# Patient Record
Sex: FEMALE | Race: WHITE | NOT HISPANIC OR LATINO | Employment: OTHER | URBAN - METROPOLITAN AREA
[De-identification: names, ages, dates, MRNs, and addresses within clinical notes are randomized per-mention and may not be internally consistent; named-entity substitution may affect disease eponyms.]

---

## 2017-02-09 ENCOUNTER — HOSPITAL ENCOUNTER (OUTPATIENT)
Dept: GENERAL RADIOLOGY | Facility: HOSPITAL | Age: 82
Discharge: HOME | End: 2017-02-09
Admitting: ORTHOPAEDIC SURGERY

## 2017-02-09 ENCOUNTER — APPOINTMENT (OUTPATIENT)
Dept: PREADMISSION TESTING | Facility: HOSPITAL | Age: 82
End: 2017-02-09

## 2017-02-09 LAB
ABO GROUP BLD: NORMAL
ANION GAP SERPL CALCULATED.3IONS-SCNC: 14.7 MMOL/L
ANTI-K: NORMAL
BACTERIA UR QL AUTO: ABNORMAL /HPF
BILIRUB UR QL STRIP: NEGATIVE
BLD GP AB SCN SERPL QL: POSITIVE
BUN BLD-MCNC: 20 MG/DL (ref 8–23)
BUN/CREAT SERPL: 24.1 (ref 7–25)
CALCIUM SPEC-SCNC: 9.3 MG/DL (ref 8.6–10.5)
CHLORIDE SERPL-SCNC: 100 MMOL/L (ref 98–107)
CLARITY UR: ABNORMAL
CO2 SERPL-SCNC: 24.3 MMOL/L (ref 22–29)
COLOR UR: YELLOW
CREAT BLD-MCNC: 0.83 MG/DL (ref 0.57–1)
DEPRECATED RDW RBC AUTO: 45.2 FL (ref 37–54)
ERYTHROCYTE [DISTWIDTH] IN BLOOD BY AUTOMATED COUNT: 13.9 % (ref 11.7–13)
GFR SERPL CREATININE-BSD FRML MDRD: 66 ML/MIN/1.73
GLUCOSE BLD-MCNC: 119 MG/DL (ref 65–99)
GLUCOSE UR STRIP-MCNC: NEGATIVE MG/DL
HCT VFR BLD AUTO: 35 % (ref 35.6–45.5)
HGB BLD-MCNC: 11.5 G/DL (ref 11.9–15.5)
HGB UR QL STRIP.AUTO: NEGATIVE
HYALINE CASTS UR QL AUTO: ABNORMAL /LPF
KETONES UR QL STRIP: NEGATIVE
LEUKOCYTE ESTERASE UR QL STRIP.AUTO: ABNORMAL
MCH RBC QN AUTO: 29.1 PG (ref 26.9–32)
MCHC RBC AUTO-ENTMCNC: 32.9 G/DL (ref 32.4–36.3)
MCV RBC AUTO: 88.6 FL (ref 80.5–98.2)
NITRITE UR QL STRIP: NEGATIVE
PH UR STRIP.AUTO: 6 [PH] (ref 5–8)
PLATELET # BLD AUTO: 194 10*3/MM3 (ref 140–500)
PMV BLD AUTO: 9.7 FL (ref 6–12)
POTASSIUM BLD-SCNC: 3.6 MMOL/L (ref 3.5–5.2)
PROT UR QL STRIP: ABNORMAL
RBC # BLD AUTO: 3.95 10*6/MM3 (ref 3.9–5.2)
RBC # UR: ABNORMAL /HPF
REF LAB TEST METHOD: ABNORMAL
RH BLD: POSITIVE
SODIUM BLD-SCNC: 139 MMOL/L (ref 136–145)
SP GR UR STRIP: 1.01 (ref 1–1.03)
SQUAMOUS #/AREA URNS HPF: ABNORMAL /HPF
UROBILINOGEN UR QL STRIP: ABNORMAL
WBC NRBC COR # BLD: 5.31 10*3/MM3 (ref 4.5–10.7)
WBC UR QL AUTO: ABNORMAL /HPF

## 2017-02-09 PROCEDURE — 86920 COMPATIBILITY TEST SPIN: CPT

## 2017-02-09 PROCEDURE — 87147 CULTURE TYPE IMMUNOLOGIC: CPT | Performed by: ORTHOPAEDIC SURGERY

## 2017-02-09 PROCEDURE — 80048 BASIC METABOLIC PNL TOTAL CA: CPT | Performed by: ORTHOPAEDIC SURGERY

## 2017-02-09 PROCEDURE — 86900 BLOOD TYPING SEROLOGIC ABO: CPT

## 2017-02-09 PROCEDURE — 86901 BLOOD TYPING SEROLOGIC RH(D): CPT

## 2017-02-09 PROCEDURE — 87086 URINE CULTURE/COLONY COUNT: CPT | Performed by: ORTHOPAEDIC SURGERY

## 2017-02-09 PROCEDURE — 93010 ELECTROCARDIOGRAM REPORT: CPT | Performed by: INTERNAL MEDICINE

## 2017-02-09 PROCEDURE — 86922 COMPATIBILITY TEST ANTIGLOB: CPT

## 2017-02-09 PROCEDURE — 86870 RBC ANTIBODY IDENTIFICATION: CPT

## 2017-02-09 PROCEDURE — 86902 BLOOD TYPE ANTIGEN DONOR EA: CPT

## 2017-02-09 PROCEDURE — 86850 RBC ANTIBODY SCREEN: CPT

## 2017-02-09 PROCEDURE — 36415 COLL VENOUS BLD VENIPUNCTURE: CPT

## 2017-02-09 PROCEDURE — 93005 ELECTROCARDIOGRAM TRACING: CPT

## 2017-02-09 PROCEDURE — 81001 URINALYSIS AUTO W/SCOPE: CPT | Performed by: ORTHOPAEDIC SURGERY

## 2017-02-09 PROCEDURE — 85027 COMPLETE CBC AUTOMATED: CPT | Performed by: ORTHOPAEDIC SURGERY

## 2017-02-09 PROCEDURE — 73030 X-RAY EXAM OF SHOULDER: CPT

## 2017-02-09 RX ORDER — SERTRALINE HYDROCHLORIDE 100 MG/1
100 TABLET, FILM COATED ORAL DAILY
COMMUNITY

## 2017-02-09 RX ORDER — PNV NO.95/FERROUS FUM/FOLIC AC 28MG-0.8MG
1 TABLET ORAL DAILY
COMMUNITY

## 2017-02-09 RX ORDER — AMLODIPINE AND VALSARTAN 5; 160 MG/1; MG/1
1 TABLET ORAL DAILY
COMMUNITY

## 2017-02-09 RX ORDER — SIMVASTATIN 20 MG
20 TABLET ORAL NIGHTLY
COMMUNITY

## 2017-02-09 RX ORDER — PHENOL 1.4 %
600 AEROSOL, SPRAY (ML) MUCOUS MEMBRANE 2 TIMES DAILY
COMMUNITY

## 2017-02-09 RX ORDER — CELECOXIB 200 MG/1
200 CAPSULE ORAL DAILY
COMMUNITY

## 2017-02-09 RX ORDER — LEVOTHYROXINE SODIUM 0.12 MG/1
125 TABLET ORAL DAILY
COMMUNITY

## 2017-02-09 RX ORDER — CHLORHEXIDINE GLUCONATE 500 MG/1
CLOTH TOPICAL
COMMUNITY
End: 2017-04-14 | Stop reason: HOSPADM

## 2017-02-09 RX ORDER — OMEPRAZOLE 20 MG/1
20 CAPSULE, DELAYED RELEASE ORAL DAILY
COMMUNITY

## 2017-02-09 RX ORDER — HYDROCHLOROTHIAZIDE 12.5 MG/1
12.5 TABLET ORAL DAILY
COMMUNITY

## 2017-02-09 NOTE — DISCHARGE INSTRUCTIONS
Take the following medications the morning of surgery with a small sip of water.  EXFORGE  PRILOSEC    ARRIVAL TIME TO BE CALLED TO PATIENT.        General Instructions:  • Do not eat or drink after midnight: includes water, mints, or gum. You may brush your teeth.  • Do not smoke, chew tobacco, or drink alcohol.  • Bring medications in original bottles, any inhalers and if applicable your C-PAP/ BI-PAP machine.  • Bring any papers given to you in the doctor’s office.  • Wear clean comfortable clothes and socks.  • Do not wear contact lenses or make-up.  Bring a case for your glasses if applicable.   • Bring crutches or walker if applicable.  • Leave all other valuables and jewelry at home.    If you were given a blood bank ID arm band remember to bring it with you the day of surgery.    Preventing a Surgical Site Infection:  Shower on the morning of surgery using a fresh bar of anti-bacterial soap (such as Dial) and clean washcloth.  Dry with a clean towel and dress in clean clothing.  For 2 to 3 days before surgery, avoid shaving with a razor near where you will have surgery because the razor can irritate skin and make it easier to develop an infection  Ask your surgeon if you will be receiving antibiotics prior to surgery  Make sure you, your family, and all healthcare providers clean their hands with soap and water or an alcohol based hand  before caring for you or your wound  If at all possible, quit smoking as many days before surgery as you can.    Day of surgery:  Upon arrival, a Pre-op nurse and Anesthesiologist will review your health history, obtain vital signs, and answer questions you may have.  The only belongings needed at this time will be your home medications and if applicable your C-PAP/BI-PAP machine.  If you are staying overnight your family can leave the rest of your belongings in the car and bring them to your room later.  A Pre-op nurse will start an IV and you may receive medication  in preparation for surgery, including something to help you relax.  Your family will be able to see you in the Pre-op area.  While you are in surgery your family should notify the waiting room  if they leave the waiting room area and provide a contact phone number.    Please be aware that surgery does come with discomfort.  We want to make every effort to control your discomfort so please discuss any uncontrolled symptoms with your nurse.   Your doctor will most likely have prescribed pain medications.      If you are going home after surgery you will receive individualized written care instructions before being discharged.  A responsible adult must drive you to and from the hospital on the day of your surgery and stay with you for 24 hours.    If you are staying overnight following surgery, you will be transported to your hospital room following the recovery period.  [facility] has all private rooms.    If you have any questions please call Pre-Admission Testing at [phone].  Deductibles and co-payments are collected on the day of service. Please be prepared to pay the required co-pay, deductible or deposit on the day of service as defined by your plan.    2% CHLORAHEXIDINE GLUCONATE* CLOTH  Preparing or “prepping” skin before surgery can reduce the risk of infection at the surgical site. To make the process easier, [facility] has chosen disposable cloths moistened with a rinse-free, 2% Chlorhexidine Gluconate (CHG) antiseptic solution. The steps below outline the prepping process and should be carefully followed.        Use the prep cloth on the area that is circled in the diagram             Directions Night before Surgery  1) Shower using a fresh bar of anti-bacterial soap (such as Dial) and clean washcloth.  Use a clean towel to completely dry your skin.  2) Do not use any lotions, oils or creams on your skin.  3) Open the package and remove 1 cloth, wipe your skin for  30 seconds in a circular motion.  Allow to dry for 3 minutes.  4) Repeat #3 with second cloth.  5) Do not touch your eyes, ears, or mouth with the prep cloth.  6) Allow the wet prep solution to air dry.  7) Discard the prep cloth and wash your hands with soap and water.   8) Dress in clean bed clothes and sleep on fresh clean bed sheets.   9) You may experience some temporary itching after the prep.    Directions Day of Surgery  1) Repeat steps 1,2,3,4,5,6,7, and 9.   2) Dress in clean clothes before coming to the hospital.    BACTROBAN NASAL OINTMENT  There are many germs normally in your nose. Bactroban is an ointment that will help reduce these germs. Please follow these instructions for Bactroban use:        ____The day before surgery in the morning  Date________    ____The day before surgery in the evening              Date________    ____The day of surgery in the morning    Date________    **Squirt ½ package of Bactroban Ointment onto a cotton applicator and apply to inside of 1st nostril.  Squirt the remaining Bactroban and apply to the inside of the other nostril.

## 2017-02-10 LAB — BACTERIA SPEC AEROBE CULT: ABNORMAL

## 2017-02-24 LAB
ABO + RH BLD: NORMAL
ABO + RH BLD: NORMAL
BH BB BLOOD EXPIRATION DATE: NORMAL
BH BB BLOOD EXPIRATION DATE: NORMAL
BH BB BLOOD TYPE BARCODE: 5100
BH BB BLOOD TYPE BARCODE: 5100
BH BB DISPENSE STATUS: NORMAL
BH BB DISPENSE STATUS: NORMAL
BH BB PRODUCT CODE: NORMAL
BH BB PRODUCT CODE: NORMAL
BH BB UNIT NUMBER: NORMAL
BH BB UNIT NUMBER: NORMAL
CROSSMATCH INTERPRETATION: NORMAL
CROSSMATCH INTERPRETATION: NORMAL
UNIT  ABO: NORMAL
UNIT  ABO: NORMAL
UNIT  RH: NORMAL
UNIT  RH: NORMAL

## 2017-03-29 ENCOUNTER — APPOINTMENT (OUTPATIENT)
Dept: PREADMISSION TESTING | Facility: HOSPITAL | Age: 82
End: 2017-03-29

## 2017-03-29 ENCOUNTER — HOSPITAL ENCOUNTER (OUTPATIENT)
Dept: GENERAL RADIOLOGY | Facility: HOSPITAL | Age: 82
Discharge: HOME | End: 2017-03-29
Admitting: ORTHOPAEDIC SURGERY

## 2017-03-29 VITALS
HEIGHT: 62 IN | SYSTOLIC BLOOD PRESSURE: 141 MMHG | HEART RATE: 77 BPM | OXYGEN SATURATION: 97 % | BODY MASS INDEX: 30.49 KG/M2 | DIASTOLIC BLOOD PRESSURE: 85 MMHG | TEMPERATURE: 98.3 F | RESPIRATION RATE: 20 BRPM | WEIGHT: 165.7 LBS

## 2017-03-29 LAB
ABO GROUP BLD: NORMAL
ANION GAP SERPL CALCULATED.3IONS-SCNC: 13.5 MMOL/L
ANTI-K: NORMAL
BACTERIA UR QL AUTO: ABNORMAL /HPF
BILIRUB UR QL STRIP: NEGATIVE
BLD GP AB SCN SERPL QL: POSITIVE
BUN BLD-MCNC: 19 MG/DL (ref 8–23)
BUN/CREAT SERPL: 21.3 (ref 7–25)
CALCIUM SPEC-SCNC: 10 MG/DL (ref 8.6–10.5)
CHLORIDE SERPL-SCNC: 99 MMOL/L (ref 98–107)
CLARITY UR: ABNORMAL
CO2 SERPL-SCNC: 25.5 MMOL/L (ref 22–29)
COLOR UR: YELLOW
CREAT BLD-MCNC: 0.89 MG/DL (ref 0.57–1)
DEPRECATED RDW RBC AUTO: 44.5 FL (ref 37–54)
ERYTHROCYTE [DISTWIDTH] IN BLOOD BY AUTOMATED COUNT: 13.5 % (ref 11.7–13)
GFR SERPL CREATININE-BSD FRML MDRD: 61 ML/MIN/1.73
GLUCOSE BLD-MCNC: 81 MG/DL (ref 65–99)
GLUCOSE UR STRIP-MCNC: NEGATIVE MG/DL
HCT VFR BLD AUTO: 34.1 % (ref 35.6–45.5)
HGB BLD-MCNC: 11.3 G/DL (ref 11.9–15.5)
HGB UR QL STRIP.AUTO: NEGATIVE
HYALINE CASTS UR QL AUTO: ABNORMAL /LPF
KETONES UR QL STRIP: NEGATIVE
LEUKOCYTE ESTERASE UR QL STRIP.AUTO: ABNORMAL
MCH RBC QN AUTO: 30 PG (ref 26.9–32)
MCHC RBC AUTO-ENTMCNC: 33.1 G/DL (ref 32.4–36.3)
MCV RBC AUTO: 90.5 FL (ref 80.5–98.2)
NITRITE UR QL STRIP: NEGATIVE
PH UR STRIP.AUTO: 6 [PH] (ref 5–8)
PLATELET # BLD AUTO: 206 10*3/MM3 (ref 140–500)
PMV BLD AUTO: 9.4 FL (ref 6–12)
POTASSIUM BLD-SCNC: 3.8 MMOL/L (ref 3.5–5.2)
PROT UR QL STRIP: NEGATIVE
RBC # BLD AUTO: 3.77 10*6/MM3 (ref 3.9–5.2)
RBC # UR: ABNORMAL /HPF
REF LAB TEST METHOD: ABNORMAL
RH BLD: POSITIVE
SODIUM BLD-SCNC: 138 MMOL/L (ref 136–145)
SP GR UR STRIP: 1.02 (ref 1–1.03)
SQUAMOUS #/AREA URNS HPF: ABNORMAL /HPF
T4 FREE SERPL-MCNC: 1.29 NG/DL (ref 0.93–1.7)
TSH SERPL DL<=0.05 MIU/L-ACNC: 4.79 MIU/ML (ref 0.27–4.2)
UROBILINOGEN UR QL STRIP: ABNORMAL
WBC NRBC COR # BLD: 4.88 10*3/MM3 (ref 4.5–10.7)
WBC UR QL AUTO: ABNORMAL /HPF

## 2017-03-29 PROCEDURE — 85027 COMPLETE CBC AUTOMATED: CPT | Performed by: ORTHOPAEDIC SURGERY

## 2017-03-29 PROCEDURE — 86922 COMPATIBILITY TEST ANTIGLOB: CPT

## 2017-03-29 PROCEDURE — 86920 COMPATIBILITY TEST SPIN: CPT

## 2017-03-29 PROCEDURE — 86901 BLOOD TYPING SEROLOGIC RH(D): CPT | Performed by: ORTHOPAEDIC SURGERY

## 2017-03-29 PROCEDURE — 81001 URINALYSIS AUTO W/SCOPE: CPT | Performed by: ORTHOPAEDIC SURGERY

## 2017-03-29 PROCEDURE — 86870 RBC ANTIBODY IDENTIFICATION: CPT

## 2017-03-29 PROCEDURE — 36415 COLL VENOUS BLD VENIPUNCTURE: CPT

## 2017-03-29 PROCEDURE — 73030 X-RAY EXAM OF SHOULDER: CPT

## 2017-03-29 PROCEDURE — 86850 RBC ANTIBODY SCREEN: CPT | Performed by: ORTHOPAEDIC SURGERY

## 2017-03-29 PROCEDURE — 84443 ASSAY THYROID STIM HORMONE: CPT | Performed by: ORTHOPAEDIC SURGERY

## 2017-03-29 PROCEDURE — 87086 URINE CULTURE/COLONY COUNT: CPT | Performed by: ORTHOPAEDIC SURGERY

## 2017-03-29 PROCEDURE — 84439 ASSAY OF FREE THYROXINE: CPT | Performed by: ORTHOPAEDIC SURGERY

## 2017-03-29 PROCEDURE — 86900 BLOOD TYPING SEROLOGIC ABO: CPT | Performed by: ORTHOPAEDIC SURGERY

## 2017-03-29 PROCEDURE — 80048 BASIC METABOLIC PNL TOTAL CA: CPT | Performed by: ORTHOPAEDIC SURGERY

## 2017-03-29 RX ORDER — HYDROCODONE BITARTRATE AND ACETAMINOPHEN 5; 325 MG/1; MG/1
1 TABLET ORAL EVERY 4 HOURS PRN
COMMUNITY
End: 2017-04-14 | Stop reason: HOSPADM

## 2017-03-29 RX ORDER — ZOLPIDEM TARTRATE 10 MG/1
5 TABLET ORAL NIGHTLY
COMMUNITY

## 2017-03-29 NOTE — DISCHARGE INSTRUCTIONS
Take the following medications the morning of surgery with a small sip of water:  EXFORGE, SYNTHROID, OMEPRAZOLE.  STOP PREOPERATIVELY ANY ANTIINFLAMMATORY, CELEBREX, AND MULTIVITAMIN.  ARRIVE TO THE OUTPATIENT SURGERY DESK THE DAY OF YOUR SURGERY BY:  ***TO CALL WITH TIME        General Instructions:  • Do not eat or drink after midnight: includes water, mints, or gum. You may brush your teeth.  • Do not smoke, chew tobacco, or drink alcohol.  • The Pre-Admission Testing nurse will instruct you to bring medications if unable to obtain an accurate list in Pre-Admission Testing.    • If applicable bring your C-PAP/ BI-PAP machine.  • Bring any papers given to you in the doctor’s office.  • Wear clean comfortable clothes and socks.  • Do not wear contact lenses or make-up.  Bring a case for your glasses if applicable.   • Bring crutches or walker if applicable.  • Leave all other valuables and jewelry at home.    If you were given a blood bank ID arm band remember to bring it with you the day of surgery.    Preventing a Surgical Site Infection: Sleep in clean sheets  Shower the night before and on the morning of surgery using a fresh bar of anti-bacterial soap (such as Dial) and clean washcloth.  Dry with a clean towel and dress in clean clothing.  For 2 to 3 days before surgery, avoid shaving with a razor near where you will have surgery because the razor can irritate skin and make it easier to develop an infection  Ask your surgeon if you will be receiving antibiotics prior to surgery  Make sure you, your family, and all healthcare providers clean their hands with soap and water or an alcohol based hand  before caring for you or your wound  If at all possible, quit smoking as many days before surgery as you can.    Day of surgery:  Upon arrival, a Pre-op nurse and Anesthesiologist will review your health history, obtain vital signs, and answer questions you may have.  The only belongings needed at this time  will be your home medications and if applicable your C-PAP/BI-PAP machine.  If you are staying overnight your family can leave the rest of your belongings in the car and bring them to your room later.  A Pre-op nurse will start an IV and you may receive medication in preparation for surgery, including something to help you relax.  Your family will be able to see you in the Pre-op area.  While you are in surgery your family should notify the waiting room  if they leave the waiting room area and provide a contact phone number.    Please be aware that surgery does come with discomfort.  We want to make every effort to control your discomfort so please discuss any uncontrolled symptoms with your nurse.   Your doctor will most likely have prescribed pain medications.      If you are going home after surgery you will receive individualized written care instructions before being discharged.  A responsible adult must drive you to and from the hospital on the day of your surgery and stay with you for 24 hours.    If you are staying overnight following surgery, you will be transported to your hospital room following the recovery period.  [facility] has all private rooms.    If you have any questions please call Pre-Admission Testing at [phone].  Deductibles and co-payments are collected on the day of service. Please be prepared to pay the required co-pay, deductible or deposit on the day of service as defined by your plan.    2% CHLORAHEXIDINE GLUCONATE* CLOTH  Preparing or “prepping” skin before surgery can reduce the risk of infection at the surgical site. To make the process easier, [facility] has chosen disposable cloths moistened with a rinse-free, 2% Chlorhexidine Gluconate (CHG) antiseptic solution. The steps below outline the prepping process and should be carefully followed.        Use the prep cloth on the area that is circled in the diagram             Directions Night  before Surgery  1) Shower using a fresh bar of anti-bacterial soap (such as Dial) and clean washcloth.  Use a clean towel to completely dry your skin.  2) Do not use any lotions, oils or creams on your skin.  3) Open the package and remove 1 cloth, wipe your skin for 30 seconds in a circular motion.  Allow to dry for 3 minutes.  4) Repeat #3 with second cloth.  5) Do not touch your eyes, ears, or mouth with the prep cloth.  6) Allow the wet prep solution to air dry.  7) Discard the prep cloth and wash your hands with soap and water.   8) Dress in clean bed clothes and sleep on fresh clean bed sheets.   9) You may experience some temporary itching after the prep.    Directions Day of Surgery  1) Repeat steps 1,2,3,4,5,6,7, and 9.   2) Dress in clean clothes before coming to the hospital.    BACTROBAN NASAL OINTMENT  There are many germs normally in your nose. Bactroban is an ointment that will help reduce these germs. Please follow these instructions for Bactroban use:    ____Two days before surgery in the evening Date________    ____The day before surgery in the morning  Date________    ____The day before surgery in the evening              Date________    ____The day of surgery in the morning    Date________    **Squirt ½ package of Bactroban Ointment onto a cotton applicator and apply to inside of 1st nostril.  Squirt the remaining Bactroban and apply to the inside of the other nostril.    PERIDEX- ORAL:  Use only if your surgeon has ordered  Use the night before and morning of surgery - Swish, gargle, and spit - do not swallow.

## 2017-03-30 LAB — BACTERIA SPEC AEROBE CULT: NORMAL

## 2017-04-12 ENCOUNTER — APPOINTMENT (OUTPATIENT)
Dept: GENERAL RADIOLOGY | Facility: HOSPITAL | Age: 82
End: 2017-04-12
Attending: ORTHOPAEDIC SURGERY

## 2017-04-12 ENCOUNTER — ANESTHESIA (OUTPATIENT)
Dept: PERIOP | Facility: HOSPITAL | Age: 82
End: 2017-04-12

## 2017-04-12 ENCOUNTER — HOSPITAL ENCOUNTER (INPATIENT)
Facility: HOSPITAL | Age: 82
LOS: 2 days | Discharge: HOME HEALTH | End: 2017-04-14
Attending: ORTHOPAEDIC SURGERY | Admitting: ORTHOPAEDIC SURGERY

## 2017-04-12 ENCOUNTER — ANESTHESIA EVENT (OUTPATIENT)
Dept: PERIOP | Facility: HOSPITAL | Age: 82
End: 2017-04-12

## 2017-04-12 DIAGNOSIS — M62.81 MUSCLE WEAKNESS (GENERALIZED): Primary | ICD-10-CM

## 2017-04-12 DIAGNOSIS — S43.439A SUPERIOR GLENOID LABRUM LESION: ICD-10-CM

## 2017-04-12 DIAGNOSIS — R09.02 HYPOXIA: ICD-10-CM

## 2017-04-12 PROBLEM — Z96.619 S/P REVERSE TOTAL SHOULDER ARTHROPLASTY: Status: ACTIVE | Noted: 2017-04-12

## 2017-04-12 PROCEDURE — 25010000002 ONDANSETRON PER 1 MG: Performed by: NURSE ANESTHETIST, CERTIFIED REGISTERED

## 2017-04-12 PROCEDURE — 25010000002 FENTANYL CITRATE (PF) 100 MCG/2ML SOLUTION: Performed by: ANESTHESIOLOGY

## 2017-04-12 PROCEDURE — C1776 JOINT DEVICE (IMPLANTABLE): HCPCS | Performed by: ORTHOPAEDIC SURGERY

## 2017-04-12 PROCEDURE — 25010000002 MORPHINE PER 10 MG: Performed by: ORTHOPAEDIC SURGERY

## 2017-04-12 PROCEDURE — 25010000002 PROPOFOL 10 MG/ML EMULSION: Performed by: NURSE ANESTHETIST, CERTIFIED REGISTERED

## 2017-04-12 PROCEDURE — 25010000002 MIDAZOLAM PER 1 MG: Performed by: ANESTHESIOLOGY

## 2017-04-12 PROCEDURE — 25010000002 ROPIVACAINE PER 1 MG: Performed by: ANESTHESIOLOGY

## 2017-04-12 PROCEDURE — 88304 TISSUE EXAM BY PATHOLOGIST: CPT | Performed by: ORTHOPAEDIC SURGERY

## 2017-04-12 PROCEDURE — 25010000002 NEOSTIGMINE 10 MG/10ML SOLUTION: Performed by: NURSE ANESTHETIST, CERTIFIED REGISTERED

## 2017-04-12 PROCEDURE — 25010000002 VANCOMYCIN PER 500 MG

## 2017-04-12 PROCEDURE — 0RRK00Z REPLACEMENT OF LEFT SHOULDER JOINT WITH REVERSE BALL AND SOCKET SYNTHETIC SUBSTITUTE, OPEN APPROACH: ICD-10-PCS | Performed by: ORTHOPAEDIC SURGERY

## 2017-04-12 PROCEDURE — 94799 UNLISTED PULMONARY SVC/PX: CPT

## 2017-04-12 DEVICE — PLT/JOINT GLEN EQUINOXE STD/POST: Type: IMPLANTABLE DEVICE | Site: SHOULDER | Status: FUNCTIONAL

## 2017-04-12 DEVICE — SCRW COMPR EQUINOXE LK 4.5X22MM: Type: IMPLANTABLE DEVICE | Site: SHOULDER | Status: FUNCTIONAL

## 2017-04-12 DEVICE — GLENOSPHERE SHLDR/REV EQUINOXE 38MM: Type: IMPLANTABLE DEVICE | Site: SHOULDER | Status: FUNCTIONAL

## 2017-04-12 DEVICE — SCRW COMPR EQUINOXE LK 4.5X30MM: Type: IMPLANTABLE DEVICE | Site: SHOULDER | Status: FUNCTIONAL

## 2017-04-12 DEVICE — SCRW LK EQUINOXE GLENOSPHERE REV/SHLDR: Type: IMPLANTABLE DEVICE | Site: SHOULDER | Status: FUNCTIONAL

## 2017-04-12 DEVICE — IMPLANTABLE DEVICE: Type: IMPLANTABLE DEVICE | Site: SHOULDER | Status: FUNCTIONAL

## 2017-04-12 DEVICE — LINER HUM EQUINOXE REV SHLDR 38 PLS0: Type: IMPLANTABLE DEVICE | Site: SHOULDER | Status: FUNCTIONAL

## 2017-04-12 DEVICE — SCRW COMPR EQUINOXE LK 4.5X26MM: Type: IMPLANTABLE DEVICE | Site: SHOULDER | Status: FUNCTIONAL

## 2017-04-12 DEVICE — STEM HUM PRI EQUINX PF 11MM: Type: IMPLANTABLE DEVICE | Site: SHOULDER | Status: FUNCTIONAL

## 2017-04-12 DEVICE — SCRW COMPR EQUINOXE LK 4.5X18MM: Type: IMPLANTABLE DEVICE | Site: SHOULDER | Status: FUNCTIONAL

## 2017-04-12 DEVICE — TRY HUM EQUINOXE ADPT REV SHLDR PLS0: Type: IMPLANTABLE DEVICE | Site: SHOULDER | Status: FUNCTIONAL

## 2017-04-12 DEVICE — SCRW EQUINOXE TORQ DEFINE REV SHLDR KT: Type: IMPLANTABLE DEVICE | Site: SHOULDER | Status: FUNCTIONAL

## 2017-04-12 RX ORDER — MAGNESIUM HYDROXIDE 1200 MG/15ML
LIQUID ORAL AS NEEDED
Status: DISCONTINUED | OUTPATIENT
Start: 2017-04-12 | End: 2017-04-12 | Stop reason: HOSPADM

## 2017-04-12 RX ORDER — LIDOCAINE HYDROCHLORIDE 20 MG/ML
INJECTION, SOLUTION INFILTRATION; PERINEURAL AS NEEDED
Status: DISCONTINUED | OUTPATIENT
Start: 2017-04-12 | End: 2017-04-12 | Stop reason: SURG

## 2017-04-12 RX ORDER — LABETALOL HYDROCHLORIDE 5 MG/ML
5 INJECTION, SOLUTION INTRAVENOUS
Status: DISCONTINUED | OUTPATIENT
Start: 2017-04-12 | End: 2017-04-12

## 2017-04-12 RX ORDER — ROPIVACAINE HYDROCHLORIDE 5 MG/ML
INJECTION, SOLUTION EPIDURAL; INFILTRATION; PERINEURAL AS NEEDED
Status: DISCONTINUED | OUTPATIENT
Start: 2017-04-12 | End: 2017-04-12 | Stop reason: SURG

## 2017-04-12 RX ORDER — LEVOTHYROXINE SODIUM 0.12 MG/1
125 TABLET ORAL
Status: DISCONTINUED | OUTPATIENT
Start: 2017-04-13 | End: 2017-04-14 | Stop reason: HOSPADM

## 2017-04-12 RX ORDER — PROMETHAZINE HYDROCHLORIDE 25 MG/ML
12.5 INJECTION, SOLUTION INTRAMUSCULAR; INTRAVENOUS ONCE AS NEEDED
Status: DISCONTINUED | OUTPATIENT
Start: 2017-04-12 | End: 2017-04-12

## 2017-04-12 RX ORDER — ONDANSETRON 2 MG/ML
4 INJECTION INTRAMUSCULAR; INTRAVENOUS EVERY 6 HOURS PRN
Status: DISCONTINUED | OUTPATIENT
Start: 2017-04-12 | End: 2017-04-14 | Stop reason: HOSPADM

## 2017-04-12 RX ORDER — HYDROCHLOROTHIAZIDE 25 MG/1
12.5 TABLET ORAL DAILY
Status: DISCONTINUED | OUTPATIENT
Start: 2017-04-12 | End: 2017-04-14 | Stop reason: HOSPADM

## 2017-04-12 RX ORDER — ONDANSETRON 4 MG/1
4 TABLET, ORALLY DISINTEGRATING ORAL EVERY 6 HOURS PRN
Status: DISCONTINUED | OUTPATIENT
Start: 2017-04-12 | End: 2017-04-14 | Stop reason: HOSPADM

## 2017-04-12 RX ORDER — NALOXONE HCL 0.4 MG/ML
0.2 VIAL (ML) INJECTION AS NEEDED
Status: DISCONTINUED | OUTPATIENT
Start: 2017-04-12 | End: 2017-04-12

## 2017-04-12 RX ORDER — FENTANYL CITRATE 50 UG/ML
50 INJECTION, SOLUTION INTRAMUSCULAR; INTRAVENOUS
Status: DISCONTINUED | OUTPATIENT
Start: 2017-04-12 | End: 2017-04-12

## 2017-04-12 RX ORDER — HYDROMORPHONE HYDROCHLORIDE 1 MG/ML
0.5 INJECTION, SOLUTION INTRAMUSCULAR; INTRAVENOUS; SUBCUTANEOUS
Status: DISCONTINUED | OUTPATIENT
Start: 2017-04-12 | End: 2017-04-12

## 2017-04-12 RX ORDER — DIAZEPAM 5 MG/1
5 TABLET ORAL EVERY 6 HOURS PRN
Status: DISCONTINUED | OUTPATIENT
Start: 2017-04-12 | End: 2017-04-14 | Stop reason: HOSPADM

## 2017-04-12 RX ORDER — HYDROCODONE BITARTRATE AND ACETAMINOPHEN 7.5; 325 MG/1; MG/1
1 TABLET ORAL ONCE AS NEEDED
Status: DISCONTINUED | OUTPATIENT
Start: 2017-04-12 | End: 2017-04-12

## 2017-04-12 RX ORDER — PROMETHAZINE HYDROCHLORIDE 25 MG/1
25 TABLET ORAL ONCE AS NEEDED
Status: DISCONTINUED | OUTPATIENT
Start: 2017-04-12 | End: 2017-04-12

## 2017-04-12 RX ORDER — ROCURONIUM BROMIDE 10 MG/ML
INJECTION, SOLUTION INTRAVENOUS AS NEEDED
Status: DISCONTINUED | OUTPATIENT
Start: 2017-04-12 | End: 2017-04-12 | Stop reason: SURG

## 2017-04-12 RX ORDER — ONDANSETRON 4 MG/1
4 TABLET, FILM COATED ORAL EVERY 6 HOURS PRN
Status: DISCONTINUED | OUTPATIENT
Start: 2017-04-12 | End: 2017-04-14 | Stop reason: HOSPADM

## 2017-04-12 RX ORDER — NALOXONE HCL 0.4 MG/ML
0.4 VIAL (ML) INJECTION
Status: DISCONTINUED | OUTPATIENT
Start: 2017-04-12 | End: 2017-04-14 | Stop reason: HOSPADM

## 2017-04-12 RX ORDER — VANCOMYCIN HYDROCHLORIDE 1 G/200ML
1 INJECTION, SOLUTION INTRAVENOUS ONCE
Status: COMPLETED | OUTPATIENT
Start: 2017-04-12 | End: 2017-04-12

## 2017-04-12 RX ORDER — SODIUM CHLORIDE 0.9 % (FLUSH) 0.9 %
1-10 SYRINGE (ML) INJECTION AS NEEDED
Status: DISCONTINUED | OUTPATIENT
Start: 2017-04-12 | End: 2017-04-12 | Stop reason: HOSPADM

## 2017-04-12 RX ORDER — OXYCODONE AND ACETAMINOPHEN 7.5; 325 MG/1; MG/1
1 TABLET ORAL ONCE AS NEEDED
Status: DISCONTINUED | OUTPATIENT
Start: 2017-04-12 | End: 2017-04-12

## 2017-04-12 RX ORDER — SODIUM CHLORIDE 450 MG/100ML
75 INJECTION, SOLUTION INTRAVENOUS CONTINUOUS
Status: DISCONTINUED | OUTPATIENT
Start: 2017-04-12 | End: 2017-04-14 | Stop reason: HOSPADM

## 2017-04-12 RX ORDER — MIDAZOLAM HYDROCHLORIDE 1 MG/ML
2 INJECTION INTRAMUSCULAR; INTRAVENOUS
Status: DISCONTINUED | OUTPATIENT
Start: 2017-04-12 | End: 2017-04-12 | Stop reason: HOSPADM

## 2017-04-12 RX ORDER — CELECOXIB 200 MG/1
200 CAPSULE ORAL DAILY
Status: DISCONTINUED | OUTPATIENT
Start: 2017-04-12 | End: 2017-04-14 | Stop reason: HOSPADM

## 2017-04-12 RX ORDER — OXYCODONE HYDROCHLORIDE AND ACETAMINOPHEN 5; 325 MG/1; MG/1
1 TABLET ORAL EVERY 4 HOURS PRN
Status: DISCONTINUED | OUTPATIENT
Start: 2017-04-12 | End: 2017-04-14 | Stop reason: HOSPADM

## 2017-04-12 RX ORDER — ONDANSETRON 2 MG/ML
4 INJECTION INTRAMUSCULAR; INTRAVENOUS ONCE AS NEEDED
Status: DISCONTINUED | OUTPATIENT
Start: 2017-04-12 | End: 2017-04-12

## 2017-04-12 RX ORDER — MIDAZOLAM HYDROCHLORIDE 1 MG/ML
1 INJECTION INTRAMUSCULAR; INTRAVENOUS
Status: DISCONTINUED | OUTPATIENT
Start: 2017-04-12 | End: 2017-04-12 | Stop reason: HOSPADM

## 2017-04-12 RX ORDER — NEOSTIGMINE METHYLSULFATE 1 MG/ML
INJECTION, SOLUTION INTRAVENOUS AS NEEDED
Status: DISCONTINUED | OUTPATIENT
Start: 2017-04-12 | End: 2017-04-12 | Stop reason: SURG

## 2017-04-12 RX ORDER — FENTANYL CITRATE 50 UG/ML
50 INJECTION, SOLUTION INTRAMUSCULAR; INTRAVENOUS
Status: DISCONTINUED | OUTPATIENT
Start: 2017-04-12 | End: 2017-04-12 | Stop reason: HOSPADM

## 2017-04-12 RX ORDER — ONDANSETRON 2 MG/ML
INJECTION INTRAMUSCULAR; INTRAVENOUS AS NEEDED
Status: DISCONTINUED | OUTPATIENT
Start: 2017-04-12 | End: 2017-04-12 | Stop reason: SURG

## 2017-04-12 RX ORDER — VANCOMYCIN HYDROCHLORIDE 1 G/200ML
INJECTION, SOLUTION INTRAVENOUS
Status: COMPLETED
Start: 2017-04-12 | End: 2017-04-12

## 2017-04-12 RX ORDER — ZOLPIDEM TARTRATE 5 MG/1
5 TABLET ORAL NIGHTLY
Status: DISCONTINUED | OUTPATIENT
Start: 2017-04-12 | End: 2017-04-14 | Stop reason: HOSPADM

## 2017-04-12 RX ORDER — SERTRALINE HYDROCHLORIDE 100 MG/1
100 TABLET, FILM COATED ORAL DAILY
Status: DISCONTINUED | OUTPATIENT
Start: 2017-04-12 | End: 2017-04-14 | Stop reason: HOSPADM

## 2017-04-12 RX ORDER — PROMETHAZINE HYDROCHLORIDE 25 MG/1
25 SUPPOSITORY RECTAL ONCE AS NEEDED
Status: DISCONTINUED | OUTPATIENT
Start: 2017-04-12 | End: 2017-04-12

## 2017-04-12 RX ORDER — GLYCOPYRROLATE 0.2 MG/ML
INJECTION INTRAMUSCULAR; INTRAVENOUS AS NEEDED
Status: DISCONTINUED | OUTPATIENT
Start: 2017-04-12 | End: 2017-04-12 | Stop reason: SURG

## 2017-04-12 RX ORDER — SODIUM CHLORIDE, SODIUM LACTATE, POTASSIUM CHLORIDE, CALCIUM CHLORIDE 600; 310; 30; 20 MG/100ML; MG/100ML; MG/100ML; MG/100ML
9 INJECTION, SOLUTION INTRAVENOUS CONTINUOUS
Status: DISCONTINUED | OUTPATIENT
Start: 2017-04-12 | End: 2017-04-14 | Stop reason: HOSPADM

## 2017-04-12 RX ORDER — HYDRALAZINE HYDROCHLORIDE 20 MG/ML
5 INJECTION INTRAMUSCULAR; INTRAVENOUS
Status: DISCONTINUED | OUTPATIENT
Start: 2017-04-12 | End: 2017-04-12

## 2017-04-12 RX ORDER — PROPOFOL 10 MG/ML
VIAL (ML) INTRAVENOUS AS NEEDED
Status: DISCONTINUED | OUTPATIENT
Start: 2017-04-12 | End: 2017-04-12 | Stop reason: SURG

## 2017-04-12 RX ORDER — FAMOTIDINE 10 MG/ML
20 INJECTION, SOLUTION INTRAVENOUS ONCE
Status: COMPLETED | OUTPATIENT
Start: 2017-04-12 | End: 2017-04-12

## 2017-04-12 RX ORDER — FLUMAZENIL 0.1 MG/ML
0.2 INJECTION INTRAVENOUS AS NEEDED
Status: DISCONTINUED | OUTPATIENT
Start: 2017-04-12 | End: 2017-04-12

## 2017-04-12 RX ORDER — PROMETHAZINE HYDROCHLORIDE 25 MG/1
12.5 TABLET ORAL ONCE AS NEEDED
Status: DISCONTINUED | OUTPATIENT
Start: 2017-04-12 | End: 2017-04-12

## 2017-04-12 RX ORDER — BISACODYL 10 MG
10 SUPPOSITORY, RECTAL RECTAL DAILY PRN
Status: DISCONTINUED | OUTPATIENT
Start: 2017-04-12 | End: 2017-04-14 | Stop reason: HOSPADM

## 2017-04-12 RX ORDER — SENNA AND DOCUSATE SODIUM 50; 8.6 MG/1; MG/1
2 TABLET, FILM COATED ORAL 2 TIMES DAILY
Status: DISCONTINUED | OUTPATIENT
Start: 2017-04-12 | End: 2017-04-14 | Stop reason: HOSPADM

## 2017-04-12 RX ORDER — DIPHENHYDRAMINE HYDROCHLORIDE 50 MG/ML
12.5 INJECTION INTRAMUSCULAR; INTRAVENOUS
Status: DISCONTINUED | OUTPATIENT
Start: 2017-04-12 | End: 2017-04-12

## 2017-04-12 RX ORDER — DIPHENHYDRAMINE HCL 25 MG
25 CAPSULE ORAL EVERY 6 HOURS PRN
Status: DISCONTINUED | OUTPATIENT
Start: 2017-04-12 | End: 2017-04-14 | Stop reason: HOSPADM

## 2017-04-12 RX ADMIN — GLYCOPYRROLATE 0.4 MG: 0.2 INJECTION INTRAMUSCULAR; INTRAVENOUS at 14:15

## 2017-04-12 RX ADMIN — VANCOMYCIN HYDROCHLORIDE 1 G: 1 INJECTION, SOLUTION INTRAVENOUS at 12:46

## 2017-04-12 RX ADMIN — ROPIVACAINE HYDROCHLORIDE 30 ML: 5 INJECTION, SOLUTION EPIDURAL; INFILTRATION; PERINEURAL at 12:20

## 2017-04-12 RX ADMIN — EPHEDRINE SULFATE 10 MG: 50 INJECTION INTRAMUSCULAR; INTRAVENOUS; SUBCUTANEOUS at 13:55

## 2017-04-12 RX ADMIN — FAMOTIDINE 20 MG: 10 INJECTION, SOLUTION INTRAVENOUS at 11:33

## 2017-04-12 RX ADMIN — ZOLPIDEM TARTRATE 5 MG: 5 TABLET, FILM COATED ORAL at 20:27

## 2017-04-12 RX ADMIN — EPHEDRINE SULFATE 10 MG: 50 INJECTION INTRAMUSCULAR; INTRAVENOUS; SUBCUTANEOUS at 13:45

## 2017-04-12 RX ADMIN — SODIUM CHLORIDE 75 ML/HR: 4.5 INJECTION, SOLUTION INTRAVENOUS at 19:06

## 2017-04-12 RX ADMIN — PROPOFOL 25 MG: 10 INJECTION, EMULSION INTRAVENOUS at 13:05

## 2017-04-12 RX ADMIN — NEOSTIGMINE METHYLSULFATE 2.5 MG: 1 INJECTION INTRAVENOUS at 14:15

## 2017-04-12 RX ADMIN — PROPOFOL 25 MG: 10 INJECTION, EMULSION INTRAVENOUS at 13:08

## 2017-04-12 RX ADMIN — SODIUM CHLORIDE, POTASSIUM CHLORIDE, SODIUM LACTATE AND CALCIUM CHLORIDE 9 ML/HR: 600; 310; 30; 20 INJECTION, SOLUTION INTRAVENOUS at 11:32

## 2017-04-12 RX ADMIN — SODIUM CHLORIDE, POTASSIUM CHLORIDE, SODIUM LACTATE AND CALCIUM CHLORIDE 9 ML/HR: 600; 310; 30; 20 INJECTION, SOLUTION INTRAVENOUS at 15:42

## 2017-04-12 RX ADMIN — OXYCODONE HYDROCHLORIDE AND ACETAMINOPHEN 1 TABLET: 5; 325 TABLET ORAL at 17:38

## 2017-04-12 RX ADMIN — EPHEDRINE SULFATE 10 MG: 50 INJECTION INTRAMUSCULAR; INTRAVENOUS; SUBCUTANEOUS at 13:50

## 2017-04-12 RX ADMIN — ONDANSETRON 4 MG: 2 INJECTION INTRAMUSCULAR; INTRAVENOUS at 14:15

## 2017-04-12 RX ADMIN — FENTANYL CITRATE 50 MCG: 50 INJECTION INTRAMUSCULAR; INTRAVENOUS at 12:23

## 2017-04-12 RX ADMIN — EPHEDRINE SULFATE 5 MG: 50 INJECTION INTRAMUSCULAR; INTRAVENOUS; SUBCUTANEOUS at 13:20

## 2017-04-12 RX ADMIN — MUPIROCIN CALCIUM: 20 OINTMENT TOPICAL at 18:51

## 2017-04-12 RX ADMIN — EPHEDRINE SULFATE 10 MG: 50 INJECTION INTRAMUSCULAR; INTRAVENOUS; SUBCUTANEOUS at 13:35

## 2017-04-12 RX ADMIN — CELECOXIB 200 MG: 200 CAPSULE ORAL at 20:27

## 2017-04-12 RX ADMIN — SERTRALINE 100 MG: 100 TABLET, FILM COATED ORAL at 20:27

## 2017-04-12 RX ADMIN — MIDAZOLAM 1 MG: 1 INJECTION INTRAMUSCULAR; INTRAVENOUS at 11:33

## 2017-04-12 RX ADMIN — DOCUSATE SODIUM,SENNOSIDES 2 TABLET: 50; 8.6 TABLET, FILM COATED ORAL at 18:51

## 2017-04-12 RX ADMIN — MORPHINE SULFATE 4 MG: 4 INJECTION, SOLUTION INTRAMUSCULAR; INTRAVENOUS at 18:52

## 2017-04-12 RX ADMIN — OXYCODONE HYDROCHLORIDE AND ACETAMINOPHEN 1 TABLET: 5; 325 TABLET ORAL at 21:54

## 2017-04-12 RX ADMIN — PROPOFOL 150 MG: 10 INJECTION, EMULSION INTRAVENOUS at 12:54

## 2017-04-12 RX ADMIN — FENTANYL CITRATE 50 MCG: 50 INJECTION INTRAMUSCULAR; INTRAVENOUS at 11:33

## 2017-04-12 RX ADMIN — ROCURONIUM BROMIDE 30 MG: 10 INJECTION INTRAVENOUS at 12:54

## 2017-04-12 RX ADMIN — MIDAZOLAM 1 MG: 1 INJECTION INTRAMUSCULAR; INTRAVENOUS at 12:23

## 2017-04-12 RX ADMIN — EPHEDRINE SULFATE 5 MG: 50 INJECTION INTRAMUSCULAR; INTRAVENOUS; SUBCUTANEOUS at 14:10

## 2017-04-12 RX ADMIN — LIDOCAINE HYDROCHLORIDE 50 MG: 20 INJECTION, SOLUTION INFILTRATION; PERINEURAL at 12:54

## 2017-04-12 NOTE — ANESTHESIA POSTPROCEDURE EVALUATION
Patient: Candice Walker    Procedure Summary     Date Anesthesia Start Anesthesia Stop Room / Location    04/12/17 1246 [id number]  [facility] /  [facility]       Procedure Diagnosis Surgeon Provider    LT TOTAL SHOULDER REVERSE ARTHROPLASTY (Left Shoulder) No diagnosis on file. MD Juan Luis Sinha MD          Anesthesia Type: general, regional  Last vitals  /63 (04/12/17 1445)    Temp 36.2 °C (97.2 °F) (04/12/17 1430)    Pulse 83 (04/12/17 1445)   Resp 19 (04/12/17 1445)    SpO2 96 % (04/12/17 1445)      Post Anesthesia Care and Evaluation    Patient location during evaluation: PACU  Patient participation: complete - patient participated  Level of consciousness: awake and alert  Pain management: adequate  Airway patency: patent  Anesthetic complications: No anesthetic complications    Cardiovascular status: acceptable  Respiratory status: acceptable  Hydration status: acceptable

## 2017-04-12 NOTE — ANESTHESIA PROCEDURE NOTES
Peripheral Block    Patient location during procedure: holding area  Start time: 4/12/2017 12:26 PM  Stop time: 4/12/2017 12:31 PM  Reason for block: at surgeon's request and post-op pain management  Performed by  Anesthesiologist: BEN STORM  Preanesthetic Checklist  Completed: patient identified, site marked, surgical consent, pre-op evaluation, timeout performed, IV checked, risks and benefits discussed and monitors and equipment checked  Peripheral Block Prep:  Sterile barriers:cap, gloves and mask  Prep: ChloraPrep  Patient monitoring: blood pressure monitoring, continuous pulse oximetry and EKG  Peripheral Procedure  Sedation:yes  Guidance:ultrasound guided  Images:still images obtained    Laterality:left  Block Type:interscalene  Injection Technique:single-shot  Needle Type:echogenic  Needle Gauge:22 G  ULTRASOUND INTERPRETATION.  Using ultrasound guidance a 22 G (22G needle for placement of 3cc 2%lido to site prior to start of procedure.) gauge needle was placed in close proximity to the brachial plexus nerve, at which point, under ultrasound guidance anesthetic was injected in the area of the nerve and spread of the anesthesia was seen on ultrasound in close proximity thereto.  There were no abnormalities seen on ultrasound; a digital image was taken; and the patient tolerated the procedure with no complications.   Medications  Local Injected:ropivacaine 0.5% without epinephrine Local Amount Injected:30mL  Post Assessment  Injection Assessment: negative aspiration for heme, no paresthesia on injection and incremental injection  Patient Tolerance:comfortable throughout block  Complications:no

## 2017-04-12 NOTE — ANESTHESIA PROCEDURE NOTES
Airway  Airway not difficult    General Information and Staff    Patient location during procedure: OR  Anesthesiologist: YARITZA GUALLPA  CRNA: GEOVANNA NUNO    Indications and Patient Condition  Indications for airway management: airway protection    Preoxygenated: yes  MILS maintained throughout  Mask difficulty assessment: 1 - vent by mask    Final Airway Details  Final airway type: endotracheal airway      Successful airway: ETT  Cuffed: yes   Successful intubation technique: direct laryngoscopy  Facilitating devices/methods: intubating stylet  Endotracheal tube insertion site: oral  Blade: Hue  Blade size: #3  ETT size: 7.0 mm  Cormack-Lehane Classification: grade IIa - partial view of glottis  Placement verified by: chest auscultation and capnometry   Cuff volume (mL): 4  Measured from: lips  ETT to lips (cm): 20  Number of attempts at approach: 1    Additional Comments  Atraumatic ET Tube placement.  Teeth as pre-op. BLEBS.  -ABD sounds.  +ET CO2.  Secured to face

## 2017-04-12 NOTE — ANESTHESIA PREPROCEDURE EVALUATION
Anesthesia Evaluation     Patient summary reviewed and Nursing notes reviewed   no history of anesthetic complications:  NPO Status: > 8 hours   Airway   Mallampati: II  TM distance: >3 FB  Neck ROM: full  no difficulty expected  Dental - normal exam     Pulmonary - negative pulmonary ROS and normal exam   Cardiovascular - normal exam  Exercise tolerance: good (4-7 METS)    ECG reviewed  Rhythm: regular  Rate: normal    (+) hypertension well controlled, valvular problems/murmurs,     ROS comment: Known RBBB    Neuro/Psych  (+) psychiatric history Anxiety and Depression,    GI/Hepatic/Renal/Endo    (+)  GERD well controlled,     Musculoskeletal     Abdominal  - normal exam   Substance History - negative use     OB/GYN negative ob/gyn ROS         Other   (+) arthritis                                 Anesthesia Plan    ASA 3     general and regional   (GA w/ISB)  intravenous induction   Anesthetic plan and risks discussed with patient.    Plan discussed with CRNA and attending.

## 2017-04-13 ENCOUNTER — APPOINTMENT (OUTPATIENT)
Dept: GENERAL RADIOLOGY | Facility: HOSPITAL | Age: 82
End: 2017-04-13

## 2017-04-13 LAB
ABO + RH BLD: NORMAL
ABO + RH BLD: NORMAL
ANION GAP SERPL CALCULATED.3IONS-SCNC: 13 MMOL/L
BH BB BLOOD EXPIRATION DATE: NORMAL
BH BB BLOOD EXPIRATION DATE: NORMAL
BH BB BLOOD TYPE BARCODE: 5100
BH BB BLOOD TYPE BARCODE: 5100
BH BB DISPENSE STATUS: NORMAL
BH BB DISPENSE STATUS: NORMAL
BH BB PRODUCT CODE: NORMAL
BH BB PRODUCT CODE: NORMAL
BH BB UNIT NUMBER: NORMAL
BH BB UNIT NUMBER: NORMAL
BUN BLD-MCNC: 15 MG/DL (ref 8–23)
BUN/CREAT SERPL: 15.5 (ref 7–25)
CALCIUM SPEC-SCNC: 8.2 MG/DL (ref 8.6–10.5)
CHLORIDE SERPL-SCNC: 98 MMOL/L (ref 98–107)
CO2 SERPL-SCNC: 25 MMOL/L (ref 22–29)
CREAT BLD-MCNC: 0.97 MG/DL (ref 0.57–1)
CROSSMATCH INTERPRETATION: NORMAL
CROSSMATCH INTERPRETATION: NORMAL
GFR SERPL CREATININE-BSD FRML MDRD: 55 ML/MIN/1.73
GLUCOSE BLD-MCNC: 104 MG/DL (ref 65–99)
HCT VFR BLD AUTO: 26.5 % (ref 35.6–45.5)
HGB BLD-MCNC: 8.6 G/DL (ref 11.9–15.5)
POTASSIUM BLD-SCNC: 3.3 MMOL/L (ref 3.5–5.2)
SODIUM BLD-SCNC: 136 MMOL/L (ref 136–145)
UNIT  ABO: NORMAL
UNIT  ABO: NORMAL
UNIT  RH: NORMAL
UNIT  RH: NORMAL

## 2017-04-13 PROCEDURE — 25010000002 ENOXAPARIN PER 10 MG: Performed by: ORTHOPAEDIC SURGERY

## 2017-04-13 PROCEDURE — 25010000002 METHYLPREDNISOLONE PER 40 MG: Performed by: INTERNAL MEDICINE

## 2017-04-13 PROCEDURE — 25010000002 VANCOMYCIN PER 500 MG: Performed by: ORTHOPAEDIC SURGERY

## 2017-04-13 PROCEDURE — 85014 HEMATOCRIT: CPT | Performed by: ORTHOPAEDIC SURGERY

## 2017-04-13 PROCEDURE — 94799 UNLISTED PULMONARY SVC/PX: CPT

## 2017-04-13 PROCEDURE — 80048 BASIC METABOLIC PNL TOTAL CA: CPT | Performed by: ORTHOPAEDIC SURGERY

## 2017-04-13 PROCEDURE — 85018 HEMOGLOBIN: CPT | Performed by: ORTHOPAEDIC SURGERY

## 2017-04-13 PROCEDURE — 94640 AIRWAY INHALATION TREATMENT: CPT

## 2017-04-13 RX ORDER — OXYCODONE HYDROCHLORIDE AND ACETAMINOPHEN 5; 325 MG/1; MG/1
2 TABLET ORAL EVERY 4 HOURS PRN
Qty: 40 TABLET | Refills: 0 | Status: SHIPPED | OUTPATIENT
Start: 2017-04-13 | End: 2017-04-14

## 2017-04-13 RX ORDER — METHYLPREDNISOLONE SODIUM SUCCINATE 40 MG/ML
20 INJECTION, POWDER, LYOPHILIZED, FOR SOLUTION INTRAMUSCULAR; INTRAVENOUS ONCE
Status: COMPLETED | OUTPATIENT
Start: 2017-04-13 | End: 2017-04-13

## 2017-04-13 RX ORDER — IPRATROPIUM BROMIDE AND ALBUTEROL SULFATE 2.5; .5 MG/3ML; MG/3ML
3 SOLUTION RESPIRATORY (INHALATION)
Status: DISCONTINUED | OUTPATIENT
Start: 2017-04-13 | End: 2017-04-14 | Stop reason: HOSPADM

## 2017-04-13 RX ORDER — VANCOMYCIN HYDROCHLORIDE 1 G/200ML
1 INJECTION, SOLUTION INTRAVENOUS ONCE
Status: COMPLETED | OUTPATIENT
Start: 2017-04-13 | End: 2017-04-13

## 2017-04-13 RX ADMIN — OXYCODONE HYDROCHLORIDE AND ACETAMINOPHEN 1 TABLET: 5; 325 TABLET ORAL at 17:47

## 2017-04-13 RX ADMIN — OXYCODONE HYDROCHLORIDE AND ACETAMINOPHEN 1 TABLET: 5; 325 TABLET ORAL at 22:23

## 2017-04-13 RX ADMIN — OXYCODONE HYDROCHLORIDE AND ACETAMINOPHEN 1 TABLET: 5; 325 TABLET ORAL at 11:51

## 2017-04-13 RX ADMIN — METHYLPREDNISOLONE SODIUM SUCCINATE 20 MG: 40 INJECTION, POWDER, FOR SOLUTION INTRAMUSCULAR; INTRAVENOUS at 23:47

## 2017-04-13 RX ADMIN — VANCOMYCIN HYDROCHLORIDE 1 G: 1 INJECTION, SOLUTION INTRAVENOUS at 10:16

## 2017-04-13 RX ADMIN — ZOLPIDEM TARTRATE 5 MG: 5 TABLET, FILM COATED ORAL at 23:15

## 2017-04-13 RX ADMIN — MUPIROCIN CALCIUM: 20 OINTMENT TOPICAL at 09:21

## 2017-04-13 RX ADMIN — CELECOXIB 200 MG: 200 CAPSULE ORAL at 09:21

## 2017-04-13 RX ADMIN — ENOXAPARIN SODIUM 40 MG: 40 INJECTION SUBCUTANEOUS at 09:21

## 2017-04-13 RX ADMIN — LEVOTHYROXINE SODIUM 125 MCG: 125 TABLET ORAL at 06:21

## 2017-04-13 RX ADMIN — SERTRALINE 100 MG: 100 TABLET, FILM COATED ORAL at 09:22

## 2017-04-13 RX ADMIN — HYDROCHLOROTHIAZIDE 12.5 MG: 25 TABLET ORAL at 09:21

## 2017-04-13 RX ADMIN — OXYCODONE HYDROCHLORIDE AND ACETAMINOPHEN 1 TABLET: 5; 325 TABLET ORAL at 06:21

## 2017-04-13 RX ADMIN — DOCUSATE SODIUM,SENNOSIDES 2 TABLET: 50; 8.6 TABLET, FILM COATED ORAL at 17:26

## 2017-04-13 RX ADMIN — DOCUSATE SODIUM,SENNOSIDES 2 TABLET: 50; 8.6 TABLET, FILM COATED ORAL at 09:21

## 2017-04-13 RX ADMIN — IPRATROPIUM BROMIDE AND ALBUTEROL SULFATE 3 ML: .5; 3 SOLUTION RESPIRATORY (INHALATION) at 23:33

## 2017-04-13 RX ADMIN — OXYCODONE HYDROCHLORIDE AND ACETAMINOPHEN 1 TABLET: 5; 325 TABLET ORAL at 02:21

## 2017-04-14 VITALS
OXYGEN SATURATION: 93 % | HEART RATE: 91 BPM | HEIGHT: 62 IN | TEMPERATURE: 96.7 F | SYSTOLIC BLOOD PRESSURE: 102 MMHG | WEIGHT: 164 LBS | DIASTOLIC BLOOD PRESSURE: 54 MMHG | RESPIRATION RATE: 20 BRPM | BODY MASS INDEX: 30.18 KG/M2

## 2017-04-14 LAB
CYTO UR: NORMAL
LAB AP CASE REPORT: NORMAL
Lab: NORMAL
PATH REPORT.FINAL DX SPEC: NORMAL
PATH REPORT.GROSS SPEC: NORMAL

## 2017-04-14 PROCEDURE — 94799 UNLISTED PULMONARY SVC/PX: CPT

## 2017-04-14 PROCEDURE — 25010000002 ENOXAPARIN PER 10 MG: Performed by: ORTHOPAEDIC SURGERY

## 2017-04-14 RX ORDER — OXYCODONE HYDROCHLORIDE AND ACETAMINOPHEN 5; 325 MG/1; MG/1
2 TABLET ORAL EVERY 4 HOURS PRN
Qty: 40 TABLET | Refills: 0 | Status: SHIPPED | OUTPATIENT
Start: 2017-04-14

## 2017-04-14 RX ORDER — ALBUTEROL SULFATE 90 UG/1
2 AEROSOL, METERED RESPIRATORY (INHALATION) EVERY 4 HOURS PRN
Qty: 6.7 G | Refills: 2 | Status: SHIPPED | OUTPATIENT
Start: 2017-04-14

## 2017-04-14 RX ADMIN — SERTRALINE 100 MG: 100 TABLET, FILM COATED ORAL at 08:38

## 2017-04-14 RX ADMIN — ENOXAPARIN SODIUM 40 MG: 40 INJECTION SUBCUTANEOUS at 08:39

## 2017-04-14 RX ADMIN — IPRATROPIUM BROMIDE AND ALBUTEROL SULFATE 3 ML: .5; 3 SOLUTION RESPIRATORY (INHALATION) at 06:55

## 2017-04-14 RX ADMIN — OXYCODONE HYDROCHLORIDE AND ACETAMINOPHEN 1 TABLET: 5; 325 TABLET ORAL at 02:49

## 2017-04-14 RX ADMIN — DOCUSATE SODIUM,SENNOSIDES 2 TABLET: 50; 8.6 TABLET, FILM COATED ORAL at 08:38

## 2017-04-14 RX ADMIN — OXYCODONE HYDROCHLORIDE AND ACETAMINOPHEN 1 TABLET: 5; 325 TABLET ORAL at 12:21

## 2017-04-14 RX ADMIN — IPRATROPIUM BROMIDE AND ALBUTEROL SULFATE 3 ML: .5; 3 SOLUTION RESPIRATORY (INHALATION) at 12:53

## 2017-04-14 RX ADMIN — CELECOXIB 200 MG: 200 CAPSULE ORAL at 08:38

## 2017-04-14 RX ADMIN — LEVOTHYROXINE SODIUM 125 MCG: 125 TABLET ORAL at 06:30

## 2017-08-15 ENCOUNTER — APPOINTMENT (OUTPATIENT)
Dept: WOMENS IMAGING | Facility: HOSPITAL | Age: 82
End: 2017-08-15

## 2017-08-15 PROCEDURE — G0202 SCR MAMMO BI INCL CAD: HCPCS | Performed by: RADIOLOGY

## 2017-08-15 PROCEDURE — 77063 BREAST TOMOSYNTHESIS BI: CPT | Performed by: RADIOLOGY

## 2018-12-04 ENCOUNTER — TRANSCRIBE ORDERS (OUTPATIENT)
Dept: ADMINISTRATIVE | Facility: HOSPITAL | Age: 83
End: 2018-12-04

## 2018-12-04 DIAGNOSIS — M79.672 LEFT FOOT PAIN: Primary | ICD-10-CM

## 2018-12-11 ENCOUNTER — APPOINTMENT (OUTPATIENT)
Dept: WOMENS IMAGING | Facility: HOSPITAL | Age: 83
End: 2018-12-11

## 2018-12-11 PROCEDURE — 77063 BREAST TOMOSYNTHESIS BI: CPT | Performed by: RADIOLOGY

## 2018-12-11 PROCEDURE — 77067 SCR MAMMO BI INCL CAD: CPT | Performed by: RADIOLOGY

## 2018-12-24 ENCOUNTER — HOSPITAL ENCOUNTER (OUTPATIENT)
Dept: GENERAL RADIOLOGY | Facility: HOSPITAL | Age: 83
Discharge: HOME | End: 2018-12-24
Attending: ORTHOPAEDIC SURGERY | Admitting: ORTHOPAEDIC SURGERY

## 2018-12-24 DIAGNOSIS — M79.672 LEFT FOOT PAIN: ICD-10-CM

## 2018-12-24 PROCEDURE — 25010000002 METHYLPREDNISOLONE PER 40 MG: Performed by: ORTHOPAEDIC SURGERY

## 2018-12-24 PROCEDURE — 25010000002 IOPAMIDOL 61 % SOLUTION: Performed by: ORTHOPAEDIC SURGERY

## 2018-12-24 PROCEDURE — 77002 NEEDLE LOCALIZATION BY XRAY: CPT

## 2018-12-24 RX ORDER — BUPIVACAINE HYDROCHLORIDE 2.5 MG/ML
10 INJECTION, SOLUTION EPIDURAL; INFILTRATION; INTRACAUDAL ONCE
Status: COMPLETED | OUTPATIENT
Start: 2018-12-24 | End: 2018-12-24

## 2018-12-24 RX ORDER — METHYLPREDNISOLONE ACETATE 40 MG/ML
40 INJECTION, SUSPENSION INTRA-ARTICULAR; INTRALESIONAL; INTRAMUSCULAR; SOFT TISSUE ONCE
Status: COMPLETED | OUTPATIENT
Start: 2018-12-24 | End: 2018-12-24

## 2018-12-24 RX ORDER — LIDOCAINE HYDROCHLORIDE 10 MG/ML
10 INJECTION, SOLUTION INFILTRATION; PERINEURAL ONCE
Status: COMPLETED | OUTPATIENT
Start: 2018-12-24 | End: 2018-12-24

## 2018-12-24 RX ADMIN — METHYLPREDNISOLONE ACETATE 40 MG: 40 INJECTION, SUSPENSION INTRA-ARTICULAR; INTRALESIONAL; INTRAMUSCULAR; SOFT TISSUE at 13:04

## 2018-12-24 RX ADMIN — LIDOCAINE HYDROCHLORIDE 1 ML: 10 INJECTION, SOLUTION INFILTRATION; PERINEURAL at 13:00

## 2018-12-24 RX ADMIN — BUPIVACAINE HYDROCHLORIDE 3 ML: 2.5 INJECTION, SOLUTION EPIDURAL; INFILTRATION; INTRACAUDAL; PERINEURAL at 13:04

## 2018-12-24 RX ADMIN — IOPAMIDOL 1 ML: 612 INJECTION, SOLUTION INTRAVENOUS at 13:02

## 2019-05-23 ENCOUNTER — HOSPITAL ENCOUNTER (OUTPATIENT)
Dept: CT IMAGING | Facility: HOSPITAL | Age: 84
Discharge: HOME | End: 2019-05-23
Admitting: INTERNAL MEDICINE

## 2019-05-23 ENCOUNTER — TRANSCRIBE ORDERS (OUTPATIENT)
Dept: ADMINISTRATIVE | Facility: HOSPITAL | Age: 84
End: 2019-05-23

## 2019-05-23 DIAGNOSIS — Z82.49 FH: PULMONARY EMBOLISM: ICD-10-CM

## 2019-05-23 DIAGNOSIS — Z82.49 FH: PULMONARY EMBOLISM: Primary | ICD-10-CM

## 2019-05-23 PROCEDURE — 82565 ASSAY OF CREATININE: CPT

## 2019-05-23 PROCEDURE — 71275 CT ANGIOGRAPHY CHEST: CPT

## 2019-05-23 PROCEDURE — 0 IOPAMIDOL PER 1 ML: Performed by: INTERNAL MEDICINE

## 2019-05-23 RX ADMIN — IOPAMIDOL 100 ML: 755 INJECTION, SOLUTION INTRAVENOUS at 16:05

## 2019-05-23 NOTE — NURSING NOTE
Dr. aguilar read the CT chest and spoke with Dr. vAila.  Negative scan patient can go.  Dr. Avila will call patient tomorrow.  IV pulled

## 2019-05-28 LAB — CREAT BLDA-MCNC: 1 MG/DL (ref 0.6–1.3)

## 2019-07-12 ENCOUNTER — TRANSCRIBE ORDERS (OUTPATIENT)
Dept: ADMINISTRATIVE | Facility: HOSPITAL | Age: 84
End: 2019-07-12

## 2019-07-12 DIAGNOSIS — R06.00 DYSPNEA, UNSPECIFIED TYPE: Primary | ICD-10-CM

## 2019-07-26 ENCOUNTER — HOSPITAL ENCOUNTER (OUTPATIENT)
Dept: CARDIOLOGY | Facility: HOSPITAL | Age: 84
Discharge: HOME | End: 2019-07-26
Admitting: INTERNAL MEDICINE

## 2019-07-26 DIAGNOSIS — R06.00 DYSPNEA, UNSPECIFIED TYPE: ICD-10-CM

## 2019-07-26 PROCEDURE — 93306 TTE W/DOPPLER COMPLETE: CPT | Performed by: INTERNAL MEDICINE

## 2019-07-26 PROCEDURE — 93306 TTE W/DOPPLER COMPLETE: CPT

## 2019-07-28 LAB
BH CV ECHO MEAS - ACS: 2 CM
BH CV ECHO MEAS - AO MAX PG (FULL): 10.1 MMHG
BH CV ECHO MEAS - AO MAX PG: 15.7 MMHG
BH CV ECHO MEAS - AO MEAN PG (FULL): 5 MMHG
BH CV ECHO MEAS - AO MEAN PG: 7 MMHG
BH CV ECHO MEAS - AO ROOT AREA (BSA CORRECTED): 1.5
BH CV ECHO MEAS - AO ROOT AREA: 5.7 CM^2
BH CV ECHO MEAS - AO ROOT DIAM: 2.7 CM
BH CV ECHO MEAS - AO V2 MAX: 198 CM/SEC
BH CV ECHO MEAS - AO V2 MEAN: 121 CM/SEC
BH CV ECHO MEAS - AO V2 VTI: 47.6 CM
BH CV ECHO MEAS - ASC AORTA: 3.1 CM
BH CV ECHO MEAS - AVA(I,A): 1.8 CM^2
BH CV ECHO MEAS - AVA(I,D): 1.8 CM^2
BH CV ECHO MEAS - AVA(V,A): 1.7 CM^2
BH CV ECHO MEAS - AVA(V,D): 1.7 CM^2
BH CV ECHO MEAS - BSA(HAYCOCK): 1.8 M^2
BH CV ECHO MEAS - BSA: 1.8 M^2
BH CV ECHO MEAS - BZI_BMI: 30 KILOGRAMS/M^2
BH CV ECHO MEAS - BZI_METRIC_HEIGHT: 157.5 CM
BH CV ECHO MEAS - BZI_METRIC_WEIGHT: 74.4 KG
BH CV ECHO MEAS - EDV(CUBED): 117.6 ML
BH CV ECHO MEAS - EDV(MOD-SP2): 87 ML
BH CV ECHO MEAS - EDV(MOD-SP4): 107 ML
BH CV ECHO MEAS - EDV(TEICH): 112.8 ML
BH CV ECHO MEAS - EF(CUBED): 74.7 %
BH CV ECHO MEAS - EF(MOD-BP): 61 %
BH CV ECHO MEAS - EF(MOD-SP2): 59.8 %
BH CV ECHO MEAS - EF(MOD-SP4): 57.9 %
BH CV ECHO MEAS - EF(TEICH): 66.4 %
BH CV ECHO MEAS - ESV(CUBED): 29.8 ML
BH CV ECHO MEAS - ESV(MOD-SP2): 35 ML
BH CV ECHO MEAS - ESV(MOD-SP4): 45 ML
BH CV ECHO MEAS - ESV(TEICH): 37.9 ML
BH CV ECHO MEAS - FS: 36.7 %
BH CV ECHO MEAS - IVS/LVPW: 1
BH CV ECHO MEAS - IVSD: 1.2 CM
BH CV ECHO MEAS - LAT PEAK E' VEL: 7 CM/SEC
BH CV ECHO MEAS - LV DIASTOLIC VOL/BSA (35-75): 60.9 ML/M^2
BH CV ECHO MEAS - LV MASS(C)D: 226.4 GRAMS
BH CV ECHO MEAS - LV MASS(C)DI: 128.9 GRAMS/M^2
BH CV ECHO MEAS - LV MAX PG: 5.6 MMHG
BH CV ECHO MEAS - LV MEAN PG: 2 MMHG
BH CV ECHO MEAS - LV SYSTOLIC VOL/BSA (12-30): 25.6 ML/M^2
BH CV ECHO MEAS - LV V1 MAX: 118 CM/SEC
BH CV ECHO MEAS - LV V1 MEAN: 70.9 CM/SEC
BH CV ECHO MEAS - LV V1 VTI: 29.6 CM
BH CV ECHO MEAS - LVIDD: 4.9 CM
BH CV ECHO MEAS - LVIDS: 3.1 CM
BH CV ECHO MEAS - LVLD AP2: 7 CM
BH CV ECHO MEAS - LVLD AP4: 7.7 CM
BH CV ECHO MEAS - LVLS AP2: 6.9 CM
BH CV ECHO MEAS - LVLS AP4: 6.9 CM
BH CV ECHO MEAS - LVOT AREA (M): 2.8 CM^2
BH CV ECHO MEAS - LVOT AREA: 2.8 CM^2
BH CV ECHO MEAS - LVOT DIAM: 1.9 CM
BH CV ECHO MEAS - LVPWD: 1.2 CM
BH CV ECHO MEAS - MED PEAK E' VEL: 5 CM/SEC
BH CV ECHO MEAS - MR MAX PG: 206.2 MMHG
BH CV ECHO MEAS - MR MAX VEL: 718 CM/SEC
BH CV ECHO MEAS - MV A DUR: 0.15 SEC
BH CV ECHO MEAS - MV A MAX VEL: 89.7 CM/SEC
BH CV ECHO MEAS - MV DEC SLOPE: 314 CM/SEC^2
BH CV ECHO MEAS - MV DEC TIME: 160 SEC
BH CV ECHO MEAS - MV E MAX VEL: 108 CM/SEC
BH CV ECHO MEAS - MV E/A: 1.2
BH CV ECHO MEAS - MV MAX PG: 4.7 MMHG
BH CV ECHO MEAS - MV MEAN PG: 2 MMHG
BH CV ECHO MEAS - MV P1/2T MAX VEL: 102 CM/SEC
BH CV ECHO MEAS - MV P1/2T: 95.1 MSEC
BH CV ECHO MEAS - MV V2 MAX: 108 CM/SEC
BH CV ECHO MEAS - MV V2 MEAN: 61.3 CM/SEC
BH CV ECHO MEAS - MV V2 VTI: 36.4 CM
BH CV ECHO MEAS - MVA P1/2T LCG: 2.2 CM^2
BH CV ECHO MEAS - MVA(P1/2T): 2.3 CM^2
BH CV ECHO MEAS - MVA(VTI): 2.3 CM^2
BH CV ECHO MEAS - PA ACC TIME: 0.11 SEC
BH CV ECHO MEAS - PA MAX PG (FULL): -0.2 MMHG
BH CV ECHO MEAS - PA MAX PG: 2.4 MMHG
BH CV ECHO MEAS - PA PR(ACCEL): 29.1 MMHG
BH CV ECHO MEAS - PA V2 MAX: 77.3 CM/SEC
BH CV ECHO MEAS - PVA(V,A): 5.5 CM^2
BH CV ECHO MEAS - PVA(V,D): 5.5 CM^2
BH CV ECHO MEAS - QP/QS: 1.2
BH CV ECHO MEAS - RAP SYSTOLE: 3 MMHG
BH CV ECHO MEAS - RV MAX PG: 2.6 MMHG
BH CV ECHO MEAS - RV MEAN PG: 1 MMHG
BH CV ECHO MEAS - RV V1 MAX: 80.5 CM/SEC
BH CV ECHO MEAS - RV V1 MEAN: 49.2 CM/SEC
BH CV ECHO MEAS - RV V1 VTI: 19.1 CM
BH CV ECHO MEAS - RVOT AREA: 5.3 CM^2
BH CV ECHO MEAS - RVOT DIAM: 2.6 CM
BH CV ECHO MEAS - RVSP: 56 MMHG
BH CV ECHO MEAS - SI(AO): 155.1 ML/M^2
BH CV ECHO MEAS - SI(CUBED): 50 ML/M^2
BH CV ECHO MEAS - SI(LVOT): 47.8 ML/M^2
BH CV ECHO MEAS - SI(MOD-SP2): 29.6 ML/M^2
BH CV ECHO MEAS - SI(MOD-SP4): 35.3 ML/M^2
BH CV ECHO MEAS - SI(TEICH): 42.6 ML/M^2
BH CV ECHO MEAS - SV(AO): 272.5 ML
BH CV ECHO MEAS - SV(CUBED): 87.9 ML
BH CV ECHO MEAS - SV(LVOT): 83.9 ML
BH CV ECHO MEAS - SV(MOD-SP2): 52 ML
BH CV ECHO MEAS - SV(MOD-SP4): 62 ML
BH CV ECHO MEAS - SV(RVOT): 101.4 ML
BH CV ECHO MEAS - SV(TEICH): 74.9 ML
BH CV ECHO MEAS - TAPSE (>1.6): 1.9 CM2
BH CV ECHO MEAS - TR MAX VEL: 364 CM/SEC
BH CV ECHO MEASUREMENTS AVERAGE E/E' RATIO: 18
BH CV VAS BP RIGHT ARM: NORMAL MMHG
BH CV XLRA - RV BASE: 3.7 CM
BH CV XLRA - TDI S': 10 CM/SEC
LEFT ATRIUM VOLUME INDEX: 41 ML/M2
LV EF 2D ECHO EST: 61 %
MAXIMAL PREDICTED HEART RATE: 136 BPM
STRESS TARGET HR: 116 BPM

## 2020-01-14 ENCOUNTER — APPOINTMENT (OUTPATIENT)
Dept: WOMENS IMAGING | Facility: HOSPITAL | Age: 85
End: 2020-01-14

## 2020-01-14 PROCEDURE — 77067 SCR MAMMO BI INCL CAD: CPT | Performed by: RADIOLOGY

## 2020-01-14 PROCEDURE — 77063 BREAST TOMOSYNTHESIS BI: CPT | Performed by: RADIOLOGY

## 2021-03-14 ENCOUNTER — HOSPITAL ENCOUNTER (EMERGENCY)
Facility: HOSPITAL | Age: 86
Discharge: HOME | End: 2021-03-14
Attending: EMERGENCY MEDICINE | Admitting: EMERGENCY MEDICINE

## 2021-03-14 ENCOUNTER — APPOINTMENT (OUTPATIENT)
Dept: GENERAL RADIOLOGY | Facility: HOSPITAL | Age: 86
End: 2021-03-14

## 2021-03-14 VITALS
SYSTOLIC BLOOD PRESSURE: 135 MMHG | HEIGHT: 61 IN | RESPIRATION RATE: 18 BRPM | OXYGEN SATURATION: 99 % | BODY MASS INDEX: 30.21 KG/M2 | HEART RATE: 74 BPM | WEIGHT: 160 LBS | TEMPERATURE: 97.4 F | DIASTOLIC BLOOD PRESSURE: 62 MMHG

## 2021-03-14 DIAGNOSIS — L03.113 CELLULITIS OF RIGHT ARM: Primary | ICD-10-CM

## 2021-03-14 LAB
ALBUMIN SERPL-MCNC: 3.5 G/DL (ref 3.5–5.2)
ALBUMIN/GLOB SERPL: 1.1 G/DL
ALP SERPL-CCNC: 103 U/L (ref 39–117)
ALT SERPL W P-5'-P-CCNC: 15 U/L (ref 1–33)
ANION GAP SERPL CALCULATED.3IONS-SCNC: 10.2 MMOL/L (ref 5–15)
AST SERPL-CCNC: 15 U/L (ref 1–32)
BASOPHILS # BLD AUTO: 0.03 10*3/MM3 (ref 0–0.2)
BASOPHILS NFR BLD AUTO: 0.4 % (ref 0–1.5)
BILIRUB SERPL-MCNC: 0.3 MG/DL (ref 0–1.2)
BUN SERPL-MCNC: 11 MG/DL (ref 8–23)
BUN/CREAT SERPL: 12 (ref 7–25)
CALCIUM SPEC-SCNC: 8.7 MG/DL (ref 8.6–10.5)
CHLORIDE SERPL-SCNC: 103 MMOL/L (ref 98–107)
CO2 SERPL-SCNC: 24.8 MMOL/L (ref 22–29)
CREAT SERPL-MCNC: 0.92 MG/DL (ref 0.57–1)
D-LACTATE SERPL-SCNC: 1.8 MMOL/L (ref 0.5–2)
DEPRECATED RDW RBC AUTO: 43.2 FL (ref 37–54)
EOSINOPHIL # BLD AUTO: 0.25 10*3/MM3 (ref 0–0.4)
EOSINOPHIL NFR BLD AUTO: 3.2 % (ref 0.3–6.2)
ERYTHROCYTE [DISTWIDTH] IN BLOOD BY AUTOMATED COUNT: 12.8 % (ref 12.3–15.4)
GFR SERPL CREATININE-BSD FRML MDRD: 58 ML/MIN/1.73
GLOBULIN UR ELPH-MCNC: 3.1 GM/DL
GLUCOSE SERPL-MCNC: 136 MG/DL (ref 65–99)
HCT VFR BLD AUTO: 27.1 % (ref 34–46.6)
HGB BLD-MCNC: 9.1 G/DL (ref 12–15.9)
IMM GRANULOCYTES # BLD AUTO: 0.03 10*3/MM3 (ref 0–0.05)
IMM GRANULOCYTES NFR BLD AUTO: 0.4 % (ref 0–0.5)
LYMPHOCYTES # BLD AUTO: 0.67 10*3/MM3 (ref 0.7–3.1)
LYMPHOCYTES NFR BLD AUTO: 8.5 % (ref 19.6–45.3)
MCH RBC QN AUTO: 31.3 PG (ref 26.6–33)
MCHC RBC AUTO-ENTMCNC: 33.6 G/DL (ref 31.5–35.7)
MCV RBC AUTO: 93.1 FL (ref 79–97)
MONOCYTES # BLD AUTO: 0.75 10*3/MM3 (ref 0.1–0.9)
MONOCYTES NFR BLD AUTO: 9.5 % (ref 5–12)
NEUTROPHILS NFR BLD AUTO: 6.15 10*3/MM3 (ref 1.7–7)
NEUTROPHILS NFR BLD AUTO: 78 % (ref 42.7–76)
NRBC BLD AUTO-RTO: 0 /100 WBC (ref 0–0.2)
PLATELET # BLD AUTO: 186 10*3/MM3 (ref 140–450)
PMV BLD AUTO: 9.9 FL (ref 6–12)
POTASSIUM SERPL-SCNC: 3.4 MMOL/L (ref 3.5–5.2)
PROT SERPL-MCNC: 6.6 G/DL (ref 6–8.5)
RBC # BLD AUTO: 2.91 10*6/MM3 (ref 3.77–5.28)
SODIUM SERPL-SCNC: 138 MMOL/L (ref 136–145)
WBC # BLD AUTO: 7.88 10*3/MM3 (ref 3.4–10.8)

## 2021-03-14 PROCEDURE — 83605 ASSAY OF LACTIC ACID: CPT | Performed by: EMERGENCY MEDICINE

## 2021-03-14 PROCEDURE — 85025 COMPLETE CBC W/AUTO DIFF WBC: CPT | Performed by: EMERGENCY MEDICINE

## 2021-03-14 PROCEDURE — 73070 X-RAY EXAM OF ELBOW: CPT

## 2021-03-14 PROCEDURE — 99283 EMERGENCY DEPT VISIT LOW MDM: CPT

## 2021-03-14 PROCEDURE — 80053 COMPREHEN METABOLIC PANEL: CPT | Performed by: EMERGENCY MEDICINE

## 2021-03-14 RX ORDER — AMOXICILLIN AND CLAVULANATE POTASSIUM 875; 125 MG/1; MG/1
1 TABLET, FILM COATED ORAL 2 TIMES DAILY
Qty: 14 TABLET | Refills: 0 | Status: SHIPPED | OUTPATIENT
Start: 2021-03-14

## 2021-03-14 NOTE — ED NOTES
Pt verbalized understanding to f/u with her pcp and to get her abx.      Aaron Obregon, RASTA  03/14/21 0291

## 2021-03-14 NOTE — ED TRIAGE NOTES
Pt fell and injured right arm 3/9/2021 pt reports redness and swelling to right elbow area today.     Patient masked in first look triage. I was wearing mask and goggles.

## 2021-03-14 NOTE — ED PROVIDER NOTES
EMERGENCY DEPARTMENT ENCOUNTER    Room Number:  17/17  Date of encounter:  3/14/2021  PCP: Anil Ascencio MD  Historian: Patient, family      HPI:  Chief Complaint: Right elbow pain  A complete HPI/ROS/PMH/PSH/SH/FH are unobtainable due to: None    Context: Candice Walker is a 85 y.o. female who presents to the ED via private vehicle for evaluation for worsening pain and redness in the right elbow after she sustained a fall last week.  Was seen at [facility], had a negative x-ray at that time, was admitted for chest pain work-up.  States that she really felt pretty well up until this morning when the pain was worse and she started developing some redness and swelling in the arm and elbow region.  Has had some drainage from the backside of her right elbow.  Denies any fevers, chills.  Pain seems new with certain movements.      MEDICAL RECORD REVIEW    ER visit [facility] March 9 of 2021 with chest pain and fall with right elbow pain.    XR Elbow Right March 9, 2021    Right elbow:     Dorsal elbow soft tissue laceration and swelling with few foci of soft tissue gas present. 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 Small enthesophyte at the common extensor greater than flexor tendon origin. Small joint   effusion. No definite fracture identified.     IMPRESSION:     No acute radiographic abnormality of the chest.     Dorsal right elbow soft tissue laceration and swelling with few scattered foci of soft tissue gas present. Small elbow joint effusion in the setting of osteoarthritis and chondrocalcinosis with no definite fracture visible at this time. Findings   concerning for possibility of radiographically occult fracture. Recommend consideration of short interval repeat radiographs in 7-10 days to assess for healing response or CT for more optimal visualization as clinically indicated.     Dictated by: Seven River M.D.     Images and Report reviewed and interpreted  by: Seven River M.D.     <PS><Electronically signed by: Seven River M.D.>   2021 0725     D: 2021   T: 2021      PAST MEDICAL HISTORY  Active Ambulatory Problems     Diagnosis Date Noted   • S/p reverse total shoulder arthroplasty 2017     Resolved Ambulatory Problems     Diagnosis Date Noted   • No Resolved Ambulatory Problems     Past Medical History:   Diagnosis Date   • Anemia    • Anxiety    • Arthritis    • Cancer (CMS/HCC)    • Chronic bronchitis (CMS/HCC)    • Depression    • Disease of thyroid gland    • GERD (gastroesophageal reflux disease)    • Hard of hearing    • Heart murmur    • History of kidney stones    • History of UTI 2017   • Hyperlipidemia    • Hypertension    • MRSA (methicillin resistant Staphylococcus aureus) infection    • RBBB    • Risk factors for obstructive sleep apnea    • Shoulder pain          PAST SURGICAL HISTORY  Past Surgical History:   Procedure Laterality Date   • APPENDECTOMY     • CATARACT EXTRACTION      WITH LENS IMPLANT   • CHOLECYSTECTOMY     • CYST REMOVAL      ON SPINE   • EYE SURGERY     • HYSTERECTOMY     • JOINT REPLACEMENT      LT KNEE   • MASTECTOMY Right    • MASTECTOMY RADICAL WITH AXILLARY NODE DISSECTION Right    • THYROIDECTOMY     • TOTAL SHOULDER ARTHROPLASTY W/ DISTAL CLAVICLE EXCISION Left 2017    Procedure: LT TOTAL SHOULDER REVERSE ARTHROPLASTY;  Surgeon: Ezequiel Elizalde MD;  Location: [facility];  Service:          FAMILY HISTORY  No family history on file.      SOCIAL HISTORY  Social History     Socioeconomic History   • Marital status:      Spouse name: Not on file   • Number of children: Not on file   • Years of education: Not on file   • Highest education level: Not on file   Tobacco Use   • Smoking status: Former Smoker     Packs/day: 0.50     Years: 35.00     Pack years: 17.50     Types: Cigarettes     Quit date: 1995     Years since quittin.1   Substance and Sexual  Activity   • Alcohol use: Yes     Alcohol/week: 14.0 standard drinks     Types: 14 Shots of liquor per week     Comment: TWO DAILY   • Drug use: No   • Sexual activity: Defer         ALLERGIES  Cefaclor, Penicillins, Sulfa antibiotics, and Nickel        REVIEW OF SYSTEMS  Review of Systems     All systems reviewed and negative except for those discussed in HPI.       PHYSICAL EXAM    I have reviewed the triage vital signs and nursing notes.    ED Triage Vitals [03/14/21 1459]   Temp Heart Rate Resp BP SpO2   97.4 °F (36.3 °C) 74 18 -- 98 %      Temp src Heart Rate Source Patient Position BP Location FiO2 (%)   Tympanic Monitor -- -- --       Physical Exam  General: Awake, alert, no acute distress  HEENT: Mucous membranes moist, atraumatic, EOMI  Neck: Full ROM  Pulm: Symmetric chest rise, nonlabored, lungs CTAB  Cardiovascular: Regular rate and rhythm, intact distal pulses  GI: Soft, nontender, nondistended, no rebound, no guarding, bowel sounds present  MSK: Full range of motion of the right elbow with dorsal erythema and edema with a superficial skin tear the dorsal lateral aspect that appears to be well-healing with a area of concern over the olecranon with a area of protruding tissue and appearance of purulence.  Does have some tracking erythema proximally and distally in the medial aspect of the right arm.  These are not circumferential issues  Skin: Warm, dry  Neuro: Alert and oriented x 3, GCS 15, moving all extremities, no focal deficits  Psych: Calm, cooperative      Surgical mask, protective eye goggles, and gloves used during this encounter. Patient in surgical mask.      LAB RESULTS  Recent Results (from the past 24 hour(s))   Comprehensive Metabolic Panel    Collection Time: 03/14/21  3:22 PM    Specimen: Blood   Result Value Ref Range    Glucose 136 (H) 65 - 99 mg/dL    BUN 11 8 - 23 mg/dL    Creatinine 0.92 0.57 - 1.00 mg/dL    Sodium 138 136 - 145 mmol/L    Potassium 3.4 (L) 3.5 - 5.2 mmol/L     Chloride 103 98 - 107 mmol/L    CO2 24.8 22.0 - 29.0 mmol/L    Calcium 8.7 8.6 - 10.5 mg/dL    Total Protein 6.6 6.0 - 8.5 g/dL    Albumin 3.50 3.50 - 5.20 g/dL    ALT (SGPT) 15 1 - 33 U/L    AST (SGOT) 15 1 - 32 U/L    Alkaline Phosphatase 103 39 - 117 U/L    Total Bilirubin 0.3 0.0 - 1.2 mg/dL    eGFR Non African Amer 58 (L) >60 mL/min/1.73    Globulin 3.1 gm/dL    A/G Ratio 1.1 g/dL    BUN/Creatinine Ratio 12.0 7.0 - 25.0    Anion Gap 10.2 5.0 - 15.0 mmol/L   Lactic Acid, Plasma    Collection Time: 03/14/21  3:22 PM    Specimen: Blood   Result Value Ref Range    Lactate 1.8 0.5 - 2.0 mmol/L   CBC Auto Differential    Collection Time: 03/14/21  3:22 PM    Specimen: Blood   Result Value Ref Range    WBC 7.88 3.40 - 10.80 10*3/mm3    RBC 2.91 (L) 3.77 - 5.28 10*6/mm3    Hemoglobin 9.1 (L) 12.0 - 15.9 g/dL    Hematocrit 27.1 (L) 34.0 - 46.6 %    MCV 93.1 79.0 - 97.0 fL    MCH 31.3 26.6 - 33.0 pg    MCHC 33.6 31.5 - 35.7 g/dL    RDW 12.8 12.3 - 15.4 %    RDW-SD 43.2 37.0 - 54.0 fl    MPV 9.9 6.0 - 12.0 fL    Platelets 186 140 - 450 10*3/mm3    Neutrophil % 78.0 (H) 42.7 - 76.0 %    Lymphocyte % 8.5 (L) 19.6 - 45.3 %    Monocyte % 9.5 5.0 - 12.0 %    Eosinophil % 3.2 0.3 - 6.2 %    Basophil % 0.4 0.0 - 1.5 %    Immature Grans % 0.4 0.0 - 0.5 %    Neutrophils, Absolute 6.15 1.70 - 7.00 10*3/mm3    Lymphocytes, Absolute 0.67 (L) 0.70 - 3.10 10*3/mm3    Monocytes, Absolute 0.75 0.10 - 0.90 10*3/mm3    Eosinophils, Absolute 0.25 0.00 - 0.40 10*3/mm3    Basophils, Absolute 0.03 0.00 - 0.20 10*3/mm3    Immature Grans, Absolute 0.03 0.00 - 0.05 10*3/mm3    nRBC 0.0 0.0 - 0.2 /100 WBC       Ordered the above labs and independently reviewed the results.        RADIOLOGY  XR Elbow 2 View Right    Result Date: 3/14/2021  XR ELBOW 2 VW RIGHT-  INDICATIONS: Trauma  TECHNIQUE: 2 views of the right elbow  COMPARISON: None available  FINDINGS:  Soft tissue swelling is seen posteriorly at the elbow, that could be evidence of injury,  inflammation, infection, correlate clinically. No acute fracture, erosion, or dislocation is identified. A 7 mm corticated ossific focus at the lateral margin of the distal humerus could be sequela of old injury. No elbow effusion is noted. Chondral calcification is suggested. Degenerative spurring is seen at the elbow. Follow-up/further evaluation can be obtained as indications persist.       As described.    This report was finalized on 3/14/2021 3:56 PM by Dr. Que Jarrett M.D.        I ordered the above noted radiological studies. Reviewed by me.  See dictation for official radiology interpretation.      PROCEDURES    Procedures      MEDICATIONS GIVEN IN ER    Medications - No data to display      PROGRESS, DATA ANALYSIS, CONSULTS, AND MEDICAL DECISION MAKING    All labs have been independently reviewed by me.  All radiology studies have been reviewed by me and discussed with radiologist dictating the report.   EKG's independently viewed and interpreted by me.  Discussion below represents my analysis of pertinent findings related to patient's condition, differential diagnosis, treatment plan and final disposition.    Plan to evaluate for possible cellulitis, occult fracture, sepsis, among others.    ED Course as of Mar 14 1740   Sun Mar 14, 2021   1539 WBC: 7.88 [DC]   1540 Hemoglobin(!): 9.1 [DC]   1607 Lactate: 1.8 [DC]   1609 Patient family updated on the reassuring lab and imaging studies, I do feel like antibiotics to be warranted as she does appear to have a mild cellulitis in the right elbow but do not have any acute emergent suspicion for septic joint at this time.    [DC]   1610 Patient has allergy listed for penicillins, but states that she has had amoxicillin without any issues.  Will likely start her on Augmentin with close PCP follow-up tomorrow as previously scheduled.    [DC]      ED Course User Index  [DC] Pedro Cordero MD       AS OF 17:40 EDT VITALS:    BP - 135/62  HR - 74  TEMP - 97.4  °F (36.3 °C) (Tympanic)  02 SATS - 99%        DIAGNOSIS  Final diagnoses:   Cellulitis of right arm         DISPOSITION  DISCHARGE    Patient discharged in stable condition.    Reviewed implications of results, diagnosis, meds, responsibility to follow up, warning signs and symptoms of possible worsening, potential complications and reasons to return to ER.    Patient/Family voiced understanding of above instructions.    Discussed plan for discharge, as there is no emergent indication for admission. Patient referred to primary care provider for BP management due to today's BP. Pt/family is agreeable and understands need for follow up and repeat testing.  Pt is aware that discharge does not mean that nothing is wrong but it indicates no emergency is present that requires admission and they must continue care with follow-up as given below or physician of their choice.     FOLLOW-UP  [facility] Emergency Department  [address] [facility] [address] [phone]    As needed, If symptoms worsen    Anil Ascencio MD  [address]  [facility] [id number] [phone]    Go on 3/15/2021  As scheduled         Medication List      New Prescriptions    amoxicillin-clavulanate 875-125 MG per tablet  Commonly known as: AUGMENTIN  Take 1 tablet by mouth 2 (Two) Times a Day.           Where to Get Your Medications      You can get these medications from any pharmacy    Bring a paper prescription for each of these medications  · amoxicillin-clavulanate 875-125 MG per tablet                    Pedro Cordero MD  03/14/21 2395

## 2021-03-14 NOTE — DISCHARGE INSTRUCTIONS
Complete course of antibiotics as prescribed, follow-up with your primary care provider as scheduled, ED return for worsening symptoms as needed.

## 2021-03-25 ENCOUNTER — APPOINTMENT (OUTPATIENT)
Dept: CT IMAGING | Facility: HOSPITAL | Age: 86
End: 2021-03-25

## 2021-03-25 ENCOUNTER — HOSPITAL ENCOUNTER (EMERGENCY)
Facility: HOSPITAL | Age: 86
Discharge: HOME | End: 2021-03-25
Attending: EMERGENCY MEDICINE | Admitting: EMERGENCY MEDICINE

## 2021-03-25 VITALS
TEMPERATURE: 97.6 F | DIASTOLIC BLOOD PRESSURE: 84 MMHG | SYSTOLIC BLOOD PRESSURE: 174 MMHG | RESPIRATION RATE: 16 BRPM | OXYGEN SATURATION: 96 % | HEART RATE: 84 BPM

## 2021-03-25 DIAGNOSIS — S00.03XA CONTUSION OF SCALP, INITIAL ENCOUNTER: ICD-10-CM

## 2021-03-25 DIAGNOSIS — S09.90XA CLOSED HEAD INJURY, INITIAL ENCOUNTER: ICD-10-CM
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
## 2021-03-25 LAB
ALBUMIN SERPL-MCNC: 3.8 G/DL (ref 3.5–5.2)
ALBUMIN/GLOB SERPL: 1.3 G/DL
ALP SERPL-CCNC: 93 U/L (ref 39–117)
ALT SERPL W P-5'-P-CCNC: 21 U/L (ref 1–33)
ANION GAP SERPL CALCULATED.3IONS-SCNC: 10.8 MMOL/L (ref 5–15)
AST SERPL-CCNC: 21 U/L (ref 1–32)
BASOPHILS # BLD AUTO: 0.03 10*3/MM3 (ref 0–0.2)
BASOPHILS NFR BLD AUTO: 0.5 % (ref 0–1.5)
BILIRUB SERPL-MCNC: 0.2 MG/DL (ref 0–1.2)
BUN SERPL-MCNC: 13 MG/DL (ref 8–23)
BUN/CREAT SERPL: 18.1 (ref 7–25)
CALCIUM SPEC-SCNC: 8.6 MG/DL (ref 8.6–10.5)
CHLORIDE SERPL-SCNC: 101 MMOL/L (ref 98–107)
CO2 SERPL-SCNC: 25.2 MMOL/L (ref 22–29)
CREAT SERPL-MCNC: 0.72 MG/DL (ref 0.57–1)
DEPRECATED RDW RBC AUTO: 46 FL (ref 37–54)
EOSINOPHIL # BLD AUTO: 0.27 10*3/MM3 (ref 0–0.4)
EOSINOPHIL NFR BLD AUTO: 4.1 % (ref 0.3–6.2)
ERYTHROCYTE [DISTWIDTH] IN BLOOD BY AUTOMATED COUNT: 13.4 % (ref 12.3–15.4)
ETHANOL BLD-MCNC: 75 MG/DL (ref 0–10)
ETHANOL UR QL: 0.07 %
GFR SERPL CREATININE-BSD FRML MDRD: 77 ML/MIN/1.73
GLOBULIN UR ELPH-MCNC: 2.9 GM/DL
GLUCOSE SERPL-MCNC: 85 MG/DL (ref 65–99)
HCT VFR BLD AUTO: 28.5 % (ref 34–46.6)
HGB BLD-MCNC: 9 G/DL (ref 12–15.9)
IMM GRANULOCYTES # BLD AUTO: 0.05 10*3/MM3 (ref 0–0.05)
IMM GRANULOCYTES NFR BLD AUTO: 0.8 % (ref 0–0.5)
LYMPHOCYTES # BLD AUTO: 1.02 10*3/MM3 (ref 0.7–3.1)
LYMPHOCYTES NFR BLD AUTO: 15.6 % (ref 19.6–45.3)
MCH RBC QN AUTO: 29.7 PG (ref 26.6–33)
MCHC RBC AUTO-ENTMCNC: 31.6 G/DL (ref 31.5–35.7)
MCV RBC AUTO: 94.1 FL (ref 79–97)
MONOCYTES # BLD AUTO: 0.55 10*3/MM3 (ref 0.1–0.9)
MONOCYTES NFR BLD AUTO: 8.4 % (ref 5–12)
NEUTROPHILS NFR BLD AUTO: 4.62 10*3/MM3 (ref 1.7–7)
NEUTROPHILS NFR BLD AUTO: 70.6 % (ref 42.7–76)
NRBC BLD AUTO-RTO: 0 /100 WBC (ref 0–0.2)
PLATELET # BLD AUTO: 188 10*3/MM3 (ref 140–450)
PMV BLD AUTO: 9.3 FL (ref 6–12)
POTASSIUM SERPL-SCNC: 3.6 MMOL/L (ref 3.5–5.2)
PROT SERPL-MCNC: 6.7 G/DL (ref 6–8.5)
QT INTERVAL: 468 MS
RBC # BLD AUTO: 3.03 10*6/MM3 (ref 3.77–5.28)
SODIUM SERPL-SCNC: 137 MMOL/L (ref 136–145)
TROPONIN T SERPL-MCNC: <0.01 NG/ML (ref 0–0.03)
WBC # BLD AUTO: 6.54 10*3/MM3 (ref 3.4–10.8)

## 2021-03-25 PROCEDURE — 82077 ASSAY SPEC XCP UR&BREATH IA: CPT | Performed by: EMERGENCY MEDICINE

## 2021-03-25 PROCEDURE — 80053 COMPREHEN METABOLIC PANEL: CPT | Performed by: EMERGENCY MEDICINE

## 2021-03-25 PROCEDURE — 72125 CT NECK SPINE W/O DYE: CPT

## 2021-03-25 PROCEDURE — 84484 ASSAY OF TROPONIN QUANT: CPT | Performed by: EMERGENCY MEDICINE

## 2021-03-25 PROCEDURE — 70450 CT HEAD/BRAIN W/O DYE: CPT

## 2021-03-25 PROCEDURE — 85025 COMPLETE CBC W/AUTO DIFF WBC: CPT | Performed by: EMERGENCY MEDICINE

## 2021-03-25 PROCEDURE — 99283 EMERGENCY DEPT VISIT LOW MDM: CPT

## 2021-03-25 PROCEDURE — 93010 ELECTROCARDIOGRAM REPORT: CPT | Performed by: INTERNAL MEDICINE

## 2021-03-25 PROCEDURE — 93005 ELECTROCARDIOGRAM TRACING: CPT | Performed by: EMERGENCY MEDICINE

## 2021-03-25 RX ORDER — SODIUM CHLORIDE 0.9 % (FLUSH) 0.9 %
10 SYRINGE (ML) INJECTION AS NEEDED
Status: DISCONTINUED | OUTPATIENT
Start: 2021-03-25 | End: 2021-03-25 | Stop reason: HOSPADM

## 2021-03-25 NOTE — ED PROVIDER NOTES
EMERGENCY DEPARTMENT ENCOUNTER    CHIEF COMPLAINT  Chief Complaint: Unwitnessed fall/headache  History given by: Patient  History limited by: None  Room Number: 10/10  PMD: Anil Ascencio MD      HPI:  Pt is a 85 y.o. female who presents complaining of a headache following a fall at home.  The patient remembers walking to the kitchen but does not remember sustaining a fall following that.  She did have some alcohol to drink prior to ED arrival.  Symptoms were sudden in onset and occurred just prior to ED arrival today.  She does complain of a mild occipital headache that she sustained from the fall.  Symptoms are to that area of the head and nonradiating.  Symptoms have not progressed since onset but have been present and consistent since onset.  She describes the discomfort as a dull ache.  There are no aggravating or alleviating factors.  The patient also does complain of some discomfort to her right elbow however this was not from the trauma tonight.  She recently had surgery for an olecranon bursitis of the right elbow.  She denies that this pain is any different secondary to the fall.  The patient denies any treatment rendered prior to ED arrival or previous episodes of similar symptoms.      PAST MEDICAL HISTORY  Active Ambulatory Problems     Diagnosis Date Noted   • S/p reverse total shoulder arthroplasty 04/12/2017     Resolved Ambulatory Problems     Diagnosis Date Noted   • No Resolved Ambulatory Problems     Past Medical History:   Diagnosis Date   • Anemia    • Anxiety    • Arthritis    • Cancer (CMS/[facility]) 2006   • Chronic bronchitis (CMS/[facility])    • Depression    • Disease of thyroid gland    • GERD (gastroesophageal reflux disease)    • Hard of hearing    • Heart murmur    • History of kidney stones    • History of UTI 02/2017   • Hyperlipidemia    • Hypertension    • MRSA (methicillin resistant Staphylococcus aureus) infection 2000s   • RBBB    • Risk factors for obstructive sleep apnea    •  Shoulder pain        PAST SURGICAL HISTORY  Past Surgical History:   Procedure Laterality Date   • APPENDECTOMY     • CATARACT EXTRACTION      WITH LENS IMPLANT   • CHOLECYSTECTOMY     • CYST REMOVAL      ON SPINE   • EYE SURGERY     • HYSTERECTOMY     • JOINT REPLACEMENT      LT KNEE   • MASTECTOMY Right 2006   • MASTECTOMY RADICAL WITH AXILLARY NODE DISSECTION Right    • THYROIDECTOMY     • TOTAL SHOULDER ARTHROPLASTY W/ DISTAL CLAVICLE EXCISION Left 2017    Procedure: LT TOTAL SHOULDER REVERSE ARTHROPLASTY;  Surgeon: Ezequiel Elizalde MD;  [facility] [facility];  Service:        FAMILY HISTORY  History reviewed. No pertinent family history.    SOCIAL HISTORY  Social History     Socioeconomic History   • Marital status:      Spouse name: Not on file   • Number of children: Not on file   • Years of education: Not on file   • Highest education level: Not on file   Tobacco Use   • Smoking status: Former Smoker     Packs/day: 0.50     Years: 35.00     Pack years: 17.50     Types: Cigarettes     Quit date: 1995     Years since quittin.1   Substance and Sexual Activity   • Alcohol use: Yes     Alcohol/week: 14.0 standard drinks     Types: 14 Shots of liquor per week     Comment: TWO DAILY   • Drug use: No   • Sexual activity: Defer       ALLERGIES  Cefaclor, Penicillins, Sulfa antibiotics, and Nickel    REVIEW OF SYSTEMS  Review of Systems   Constitutional: Negative for fever.   HENT: Negative for sore throat.    Eyes: Negative.    Respiratory: Negative for cough and shortness of breath.    Cardiovascular: Negative for chest pain.   Gastrointestinal: Negative for abdominal pain, diarrhea and vomiting.   Genitourinary: Negative for dysuria.   Musculoskeletal: Negative for neck pain.   Skin: Negative for rash.   Allergic/Immunologic: Negative.    Neurological: Positive for headaches. Negative for weakness and numbness.   Hematological: Negative.    Psychiatric/Behavioral: Negative.    All other  systems reviewed and are negative.      PHYSICAL EXAM  ED Triage Vitals [03/25/21 0228]   Temp Heart Rate Resp BP SpO2   97.6 °F (36.4 °C) 84 18 (!) 181/79 98 %      Temp src Heart Rate Source Patient Position BP Location FiO2 (%)   Tympanic Monitor -- -- --       Physical Exam  Vitals and nursing note reviewed.   Constitutional:       General: She is not in acute distress.  HENT:      Head: Normocephalic and atraumatic.   Eyes:      Pupils: Pupils are equal, round, and reactive to light.   Cardiovascular:      Rate and Rhythm: Normal rate and regular rhythm.      Heart sounds: Normal heart sounds.   Pulmonary:      Effort: Pulmonary effort is normal. No respiratory distress.      Breath sounds: Normal breath sounds.   Abdominal:      Palpations: Abdomen is soft.      Tenderness: There is no abdominal tenderness. There is no guarding or rebound.   Musculoskeletal:         General: Normal range of motion.      Cervical back: Normal range of motion and neck supple.      Comments: Postoperative dressing to the right elbow   Skin:     General: Skin is warm and dry.      Findings: No rash.      Comments: Small occipital contusion to the head   Neurological:      Mental Status: She is alert and oriented to person, place, and time.      Sensory: Sensation is intact.   Psychiatric:         Mood and Affect: Mood and affect normal.         LAB RESULTS  Lab Results (last 24 hours)     Procedure Component Value Units Date/Time    CBC & Differential [[id number]]  (Abnormal) Collected: 03/25/21 0314    Specimen: Blood Updated: 03/25/21 0329    Narrative:      The following orders were created for panel order CBC & Differential.  Procedure                               Abnormality         Status                     ---------                               -----------         ------                     CBC Auto Differential[[id number]]        Abnormal            Final result                 Please view results for these tests on the  individual orders.    Comprehensive Metabolic Panel [[id number]] Collected: 03/25/21 0314    Specimen: Blood Updated: 03/25/21 0348     Glucose 85 mg/dL      BUN 13 mg/dL      Creatinine 0.72 mg/dL      Sodium 137 mmol/L      Potassium 3.6 mmol/L      Chloride 101 mmol/L      CO2 25.2 mmol/L      Calcium 8.6 mg/dL      Total Protein 6.7 g/dL      Albumin 3.80 g/dL      ALT (SGPT) 21 U/L      AST (SGOT) 21 U/L      Alkaline Phosphatase 93 U/L      Total Bilirubin 0.2 mg/dL      eGFR Non African Amer 77 mL/min/1.73      Globulin 2.9 gm/dL      A/G Ratio 1.3 g/dL      BUN/Creatinine Ratio 18.1     Anion Gap 10.8 mmol/L     Narrative:      GFR Normal >60  Chronic Kidney Disease <60  Kidney Failure <15      Troponin [[id number]]  (Normal) Collected: 03/25/21 0314    Specimen: Blood Updated: 03/25/21 0348     Troponin T <0.010 ng/mL     Narrative:      Troponin T Reference Range:  <= 0.03 ng/mL-   Negative for AMI  >0.03 ng/mL-     Abnormal for myocardial necrosis.  Clinicians would have to utilize clinical acumen, EKG, Troponin and serial changes to determine if it is an Acute Myocardial Infarction or myocardial injury due to an underlying chronic condition.       Results may be falsely decreased if patient taking Biotin.      Ethanol [[id number]]  (Abnormal) Collected: 03/25/21 0314    Specimen: Blood Updated: 03/25/21 0348     Ethanol 75 mg/dL      Ethanol % 0.075 %     CBC Auto Differential [[id number]]  (Abnormal) Collected: 03/25/21 0314    Specimen: Blood Updated: 03/25/21 0329     WBC 6.54 10*3/mm3      RBC 3.03 10*6/mm3      Hemoglobin 9.0 g/dL      Hematocrit 28.5 %      MCV 94.1 fL      MCH 29.7 pg      MCHC 31.6 g/dL      RDW 13.4 %      RDW-SD 46.0 fl      MPV 9.3 fL      Platelets 188 10*3/mm3      Neutrophil % 70.6 %      Lymphocyte % 15.6 %      Monocyte % 8.4 %      Eosinophil % 4.1 %      Basophil % 0.5 %      Immature Grans % 0.8 %      Neutrophils, Absolute 4.62 10*3/mm3      Lymphocytes, Absolute 1.02  10*3/mm3      Monocytes, Absolute 0.55 10*3/mm3      Eosinophils, Absolute 0.27 10*3/mm3      Basophils, Absolute 0.03 10*3/mm3      Immature Grans, Absolute 0.05 10*3/mm3      nRBC 0.0 /100 WBC           I ordered the above labs and reviewed the results    RADIOLOGY  CT Head Without Contrast   Final Result   No acute intracranial findings. Left parieto-occipital scalp hematoma.       Radiation dose reduction techniques were utilized, including automated   exposure control and exposure modulation based on body size.       This report was finalized on 3/25/2021 4:00 AM by Dr. Barbara Allan M.D.          CT Cervical Spine Without Contrast   Final Result   No acute fracture or subluxation identified.       Radiation dose reduction techniques were utilized, including automated   exposure control and exposure modulation based on body size.       This report was finalized on 3/25/2021 4:06 AM by Dr. Barbara Allan M.D.               I ordered the above noted radiological studies. Interpreted by radiologist.  Reviewed by me in PACS.       PROCEDURES  Procedures  EKG          EKG time: 0308  Rhythm/Rate: NSR, 60  P waves and CO: nml  QRS, axis: RBBB, nml axis   ST and T waves: nml     Interpreted Contemporaneously by me, independently viewed  unchanged compared to prior 2/9/17      PROGRESS AND CONSULTS     The patient was wearing a facemask upon entrance into the room and remained in such throughout their visit.  I was wearing PPE including a facemask, eye protection, as well as gloves at any point entering the room and throughout the visit    0520  Upon reevaluation, the patient is currently asleep and once awakened, she has stable vital signs and has no acute complaints.  I did inform her that laboratory results are unremarkable as is the CT scan of the head/cervical spine.  At this point, the patient will be stable for discharge and will follow up with her primary care provider.  The patient is in agreement  with the plan and all questions have been answered.      MEDICAL DECISION MAKING  Results were reviewed/discussed with the patient and they were also made aware of online access. Pt also made aware that some labs, such as cultures, will not be resulted during ER visit and follow up with PMD is necessary.     MDM  Number of Diagnoses or Management Options     Amount and/or Complexity of Data Reviewed  Clinical lab tests: reviewed and ordered  Tests in the radiology section of CPT®: reviewed and ordered  Tests in the medicine section of CPT®: reviewed and ordered  Review and summarize past medical records: yes (Upon medical records review, the patient was last seen and evaluated on 3/15/2021 secondary to a septic olecranon bursitis of the right elbow.)  Independent visualization of images, tracings, or specimens: yes (Unremarkable CT scan of the head/cervical spine)           DIAGNOSIS  Final diagnoses:   Fall, initial encounter   Closed head injury, initial encounter   Contusion of scalp, initial encounter       DISPOSITION  DISCHARGE    Patient discharged in stable condition.    Reviewed implications of results, diagnosis, meds, responsibility to follow up, warning signs and symptoms of possible worsening, potential complications and reasons to return to ER.    Patient/Family voiced understanding of above instructions.    Discussed plan for discharge, as there is no emergent indication for admission. Patient referred to primary care provider for BP management due to today's BP. Pt/family is agreeable and understands need for follow up and repeat testing.  Pt is aware that discharge does not mean that nothing is wrong but it indicates no emergency is present that requires admission and they must continue care with follow-up as given below or physician of their choice.     FOLLOW-UP  Anil Ascencio MD  [address]  [place] [address]  [phone]    Schedule an appointment as soon as possible for a  visit            Medication List      No changes were made to your prescriptions during this visit.           Latest Documented Vital Signs:  As of 06:08 EDT  BP- 174/84 HR- 84 Temp- 97.6 °F (36.4 °C) (Tympanic) O2 sat- 96%         Hipolito Barakat MD  03/25/21 0608

## 2021-03-25 NOTE — ED NOTES
This nurse attempted to call daughter (Paty Jones) with no answer r/t discharge and pickup. This nurse left a message for daughter to call back.      Emilia Palmer, RN  03/25/21 0539

## 2021-03-25 NOTE — ED TRIAGE NOTES
Pt to ED from home via [facility] for c/o unwitnessed fall.  Pt had taken ambien and had ETOH tonight, pt is unsure how she fell.  Pt A&ox3, slow to respond.  Pt not on blood thinners, Pt has contusion to back of head.    Pt wearing mask, staff wearing appropriate PPE.

## 2021-03-25 NOTE — ED NOTES
Attempted to call daughter for ride for pt. No answer left second message to call back this nurse.     Emilia Palmer, RN  03/25/21 0677

## 2021-03-25 NOTE — ED NOTES
Pt currently on stretcher with eyes closed. Pt appears to be sleeping and in no distress at this time. Pt in appropriate PPE as well as this nurse. Mask in place. Will continue to monitor.      Emilia Palmer RN  03/25/21 0682

## 2021-09-23 ENCOUNTER — APPOINTMENT (OUTPATIENT)
Dept: WOMENS IMAGING | Facility: HOSPITAL | Age: 86
End: 2021-09-23

## 2025-06-17 ENCOUNTER — HOSPITAL ENCOUNTER (INPATIENT)
Facility: HOSPITAL | Age: OVER 89
LOS: 7 days | Discharge: NURSING, REHAB OR LONG-TERM CARE | End: 2025-06-25
Attending: EMERGENCY MEDICINE | Admitting: STUDENT IN AN ORGANIZED HEALTH CARE EDUCATION/TRAINING PROGRAM
Payer: MEDICARE

## 2025-06-17 DIAGNOSIS — R41.82 ALTERED MENTAL STATUS, UNSPECIFIED ALTERED MENTAL STATUS TYPE: ICD-10-CM

## 2025-06-17 DIAGNOSIS — Z09 FOLLOW-UP EXAM: ICD-10-CM

## 2025-06-17 DIAGNOSIS — E87.1 HYPONATREMIA: ICD-10-CM

## 2025-06-17 DIAGNOSIS — N39.0 URINARY TRACT INFECTION WITHOUT HEMATURIA, SITE UNSPECIFIED: Primary | ICD-10-CM

## 2025-06-17 DIAGNOSIS — N17.9 AKI (ACUTE KIDNEY INJURY): ICD-10-CM

## 2025-06-17 DIAGNOSIS — M50.30 DDD (DEGENERATIVE DISC DISEASE), CERVICAL: ICD-10-CM

## 2025-06-17 PROBLEM — E83.42 HYPOMAGNESEMIA: Status: ACTIVE | Noted: 2025-06-17

## 2025-06-17 PROBLEM — G93.41 METABOLIC ENCEPHALOPATHY: Status: ACTIVE | Noted: 2025-06-17

## 2025-06-17 LAB
ALBUMIN SERPL-MCNC: 3.5 G/DL (ref 3.5–5.2)
ALBUMIN/GLOB SERPL: 1.1 G/DL
ALP SERPL-CCNC: 145 U/L (ref 39–117)
ALT SERPL W P-5'-P-CCNC: 10 U/L (ref 1–33)
ANION GAP SERPL CALCULATED.3IONS-SCNC: 15.4 MMOL/L (ref 5–15)
AST SERPL-CCNC: 14 U/L (ref 1–32)
BACTERIA UR QL AUTO: ABNORMAL /HPF
BASOPHILS # BLD AUTO: 0.05 10*3/MM3 (ref 0–0.2)
BASOPHILS NFR BLD AUTO: 0.7 % (ref 0–1.5)
BILIRUB SERPL-MCNC: 0.4 MG/DL (ref 0–1.2)
BILIRUB UR QL STRIP: NEGATIVE
BUN SERPL-MCNC: 39 MG/DL (ref 8–23)
BUN/CREAT SERPL: 26.5 (ref 7–25)
CALCIUM SPEC-SCNC: 8.4 MG/DL (ref 8.6–10.5)
CHLORIDE SERPL-SCNC: 93 MMOL/L (ref 98–107)
CLARITY UR: CLEAR
CO2 SERPL-SCNC: 17.6 MMOL/L (ref 22–29)
COLOR UR: YELLOW
CREAT SERPL-MCNC: 1.47 MG/DL (ref 0.57–1)
DEPRECATED RDW RBC AUTO: 45.6 FL (ref 37–54)
EGFRCR SERPLBLD CKD-EPI 2021: 34 ML/MIN/1.73
EOSINOPHIL # BLD AUTO: 0.26 10*3/MM3 (ref 0–0.4)
EOSINOPHIL NFR BLD AUTO: 3.8 % (ref 0.3–6.2)
ERYTHROCYTE [DISTWIDTH] IN BLOOD BY AUTOMATED COUNT: 14.3 % (ref 12.3–15.4)
GLOBULIN UR ELPH-MCNC: 3.3 GM/DL
GLUCOSE SERPL-MCNC: 78 MG/DL (ref 65–99)
GLUCOSE UR STRIP-MCNC: NEGATIVE MG/DL
HCT VFR BLD AUTO: 30 % (ref 34–46.6)
HGB BLD-MCNC: 9.6 G/DL (ref 12–15.9)
HGB UR QL STRIP.AUTO: NEGATIVE
HYALINE CASTS UR QL AUTO: ABNORMAL /LPF
IMM GRANULOCYTES # BLD AUTO: 0.05 10*3/MM3 (ref 0–0.05)
IMM GRANULOCYTES NFR BLD AUTO: 0.7 % (ref 0–0.5)
KETONES UR QL STRIP: NEGATIVE
LEUKOCYTE ESTERASE UR QL STRIP.AUTO: NEGATIVE
LYMPHOCYTES # BLD AUTO: 0.78 10*3/MM3 (ref 0.7–3.1)
LYMPHOCYTES NFR BLD AUTO: 11.3 % (ref 19.6–45.3)
MAGNESIUM SERPL-MCNC: 1 MG/DL (ref 1.6–2.4)
MCH RBC QN AUTO: 27.8 PG (ref 26.6–33)
MCHC RBC AUTO-ENTMCNC: 32 G/DL (ref 31.5–35.7)
MCV RBC AUTO: 87 FL (ref 79–97)
MONOCYTES # BLD AUTO: 0.86 10*3/MM3 (ref 0.1–0.9)
MONOCYTES NFR BLD AUTO: 12.4 % (ref 5–12)
NEUTROPHILS NFR BLD AUTO: 4.91 10*3/MM3 (ref 1.7–7)
NEUTROPHILS NFR BLD AUTO: 71.1 % (ref 42.7–76)
NITRITE UR QL STRIP: POSITIVE
NRBC BLD AUTO-RTO: 0 /100 WBC (ref 0–0.2)
PH UR STRIP.AUTO: 5.5 [PH] (ref 5–8)
PLATELET # BLD AUTO: 270 10*3/MM3 (ref 140–450)
PMV BLD AUTO: 9.3 FL (ref 6–12)
POTASSIUM SERPL-SCNC: 3.5 MMOL/L (ref 3.5–5.2)
PROT SERPL-MCNC: 6.8 G/DL (ref 6–8.5)
PROT UR QL STRIP: NEGATIVE
RBC # BLD AUTO: 3.45 10*6/MM3 (ref 3.77–5.28)
RBC # UR STRIP: ABNORMAL /HPF
REF LAB TEST METHOD: ABNORMAL
SODIUM SERPL-SCNC: 126 MMOL/L (ref 136–145)
SP GR UR STRIP: 1.01 (ref 1–1.03)
SQUAMOUS #/AREA URNS HPF: ABNORMAL /HPF
UROBILINOGEN UR QL STRIP: ABNORMAL
WBC # UR STRIP: ABNORMAL /HPF
WBC NRBC COR # BLD AUTO: 6.91 10*3/MM3 (ref 3.4–10.8)

## 2025-06-17 PROCEDURE — P9612 CATHETERIZE FOR URINE SPEC: HCPCS

## 2025-06-17 PROCEDURE — G0378 HOSPITAL OBSERVATION PER HR: HCPCS

## 2025-06-17 PROCEDURE — 83935 ASSAY OF URINE OSMOLALITY: CPT

## 2025-06-17 PROCEDURE — 99291 CRITICAL CARE FIRST HOUR: CPT

## 2025-06-17 PROCEDURE — 83735 ASSAY OF MAGNESIUM: CPT | Performed by: EMERGENCY MEDICINE

## 2025-06-17 PROCEDURE — 99285 EMERGENCY DEPT VISIT HI MDM: CPT

## 2025-06-17 PROCEDURE — 25810000003 SODIUM CHLORIDE 0.9 % SOLUTION

## 2025-06-17 PROCEDURE — 84300 ASSAY OF URINE SODIUM: CPT

## 2025-06-17 PROCEDURE — 81001 URINALYSIS AUTO W/SCOPE: CPT | Performed by: EMERGENCY MEDICINE

## 2025-06-17 PROCEDURE — 85025 COMPLETE CBC W/AUTO DIFF WBC: CPT | Performed by: EMERGENCY MEDICINE

## 2025-06-17 PROCEDURE — 80053 COMPREHEN METABOLIC PANEL: CPT | Performed by: EMERGENCY MEDICINE

## 2025-06-17 PROCEDURE — 25010000002 MAGNESIUM SULFATE 4 GM/100ML SOLUTION: Performed by: STUDENT IN AN ORGANIZED HEALTH CARE EDUCATION/TRAINING PROGRAM

## 2025-06-17 PROCEDURE — 82570 ASSAY OF URINE CREATININE: CPT | Performed by: INTERNAL MEDICINE

## 2025-06-17 RX ORDER — SODIUM CHLORIDE 9 MG/ML
100 INJECTION, SOLUTION INTRAVENOUS CONTINUOUS
Status: DISCONTINUED | OUTPATIENT
Start: 2025-06-17 | End: 2025-06-18

## 2025-06-17 RX ORDER — SODIUM CHLORIDE 0.9 % (FLUSH) 0.9 %
10 SYRINGE (ML) INJECTION AS NEEDED
Status: DISCONTINUED | OUTPATIENT
Start: 2025-06-17 | End: 2025-06-25 | Stop reason: HOSPADM

## 2025-06-17 RX ORDER — ONDANSETRON 2 MG/ML
4 INJECTION INTRAMUSCULAR; INTRAVENOUS EVERY 6 HOURS PRN
Status: DISCONTINUED | OUTPATIENT
Start: 2025-06-17 | End: 2025-06-25 | Stop reason: HOSPADM

## 2025-06-17 RX ORDER — POLYETHYLENE GLYCOL 3350 17 G/17G
17 POWDER, FOR SOLUTION ORAL DAILY PRN
Status: DISCONTINUED | OUTPATIENT
Start: 2025-06-17 | End: 2025-06-25 | Stop reason: HOSPADM

## 2025-06-17 RX ORDER — NITROFURANTOIN 25; 75 MG/1; MG/1
100 CAPSULE ORAL ONCE
Status: COMPLETED | OUTPATIENT
Start: 2025-06-17 | End: 2025-06-17

## 2025-06-17 RX ORDER — MAGNESIUM SULFATE HEPTAHYDRATE 40 MG/ML
4 INJECTION, SOLUTION INTRAVENOUS ONCE
Status: COMPLETED | OUTPATIENT
Start: 2025-06-17 | End: 2025-06-18

## 2025-06-17 RX ORDER — BISACODYL 5 MG/1
5 TABLET, DELAYED RELEASE ORAL DAILY PRN
Status: DISCONTINUED | OUTPATIENT
Start: 2025-06-17 | End: 2025-06-25 | Stop reason: HOSPADM

## 2025-06-17 RX ORDER — ACETAMINOPHEN 325 MG/1
650 TABLET ORAL EVERY 4 HOURS PRN
Status: DISCONTINUED | OUTPATIENT
Start: 2025-06-17 | End: 2025-06-25 | Stop reason: HOSPADM

## 2025-06-17 RX ORDER — BISACODYL 10 MG
10 SUPPOSITORY, RECTAL RECTAL DAILY PRN
Status: DISCONTINUED | OUTPATIENT
Start: 2025-06-17 | End: 2025-06-25 | Stop reason: HOSPADM

## 2025-06-17 RX ORDER — AMOXICILLIN 250 MG
2 CAPSULE ORAL 2 TIMES DAILY PRN
Status: DISCONTINUED | OUTPATIENT
Start: 2025-06-17 | End: 2025-06-25 | Stop reason: HOSPADM

## 2025-06-17 RX ORDER — ONDANSETRON 4 MG/1
4 TABLET, ORALLY DISINTEGRATING ORAL EVERY 6 HOURS PRN
Status: DISCONTINUED | OUTPATIENT
Start: 2025-06-17 | End: 2025-06-25 | Stop reason: HOSPADM

## 2025-06-17 RX ADMIN — MAGNESIUM SULFATE HEPTAHYDRATE 4 G: 4 INJECTION, SOLUTION INTRAVENOUS at 22:59

## 2025-06-17 RX ADMIN — NITROFURANTOIN MONOHYDRATE/MACROCRYSTALLINE 100 MG: 25; 75 CAPSULE ORAL at 22:57

## 2025-06-17 RX ADMIN — SODIUM CHLORIDE 250 ML: 9 INJECTION, SOLUTION INTRAVENOUS at 23:02

## 2025-06-18 PROBLEM — J44.9 COPD (CHRONIC OBSTRUCTIVE PULMONARY DISEASE): Status: ACTIVE | Noted: 2025-06-18

## 2025-06-18 PROBLEM — F10.90 ALCOHOL USE: Status: ACTIVE | Noted: 2025-06-18

## 2025-06-18 LAB
ANION GAP SERPL CALCULATED.3IONS-SCNC: 14.4 MMOL/L (ref 5–15)
BUN SERPL-MCNC: 38 MG/DL (ref 8–23)
BUN/CREAT SERPL: 30.4 (ref 7–25)
CALCIUM SPEC-SCNC: 8.3 MG/DL (ref 8.6–10.5)
CHLORIDE SERPL-SCNC: 92 MMOL/L (ref 98–107)
CO2 SERPL-SCNC: 18.6 MMOL/L (ref 22–29)
CREAT SERPL-MCNC: 1.25 MG/DL (ref 0.57–1)
CREAT UR-MCNC: 38.7 MG/DL
DEPRECATED RDW RBC AUTO: 47.9 FL (ref 37–54)
EGFRCR SERPLBLD CKD-EPI 2021: 41.3 ML/MIN/1.73
ERYTHROCYTE [DISTWIDTH] IN BLOOD BY AUTOMATED COUNT: 14.9 % (ref 12.3–15.4)
ETHANOL BLD-MCNC: <10 MG/DL (ref 0–10)
ETHANOL UR QL: <0.01 %
GLUCOSE SERPL-MCNC: 93 MG/DL (ref 65–99)
HCT VFR BLD AUTO: 28.7 % (ref 34–46.6)
HGB BLD-MCNC: 8.9 G/DL (ref 12–15.9)
MAGNESIUM SERPL-MCNC: 2.3 MG/DL (ref 1.6–2.4)
MCH RBC QN AUTO: 27 PG (ref 26.6–33)
MCHC RBC AUTO-ENTMCNC: 31 G/DL (ref 31.5–35.7)
MCV RBC AUTO: 87 FL (ref 79–97)
OSMOLALITY UR: 270 MOSM/KG
PLATELET # BLD AUTO: 250 10*3/MM3 (ref 140–450)
PMV BLD AUTO: 9.6 FL (ref 6–12)
POTASSIUM SERPL-SCNC: 3 MMOL/L (ref 3.5–5.2)
RBC # BLD AUTO: 3.3 10*6/MM3 (ref 3.77–5.28)
SODIUM SERPL-SCNC: 125 MMOL/L (ref 136–145)
SODIUM UR-SCNC: 52 MMOL/L
TSH SERPL DL<=0.05 MIU/L-ACNC: 3.93 UIU/ML (ref 0.27–4.2)
URATE SERPL-MCNC: 8.9 MG/DL (ref 2.4–5.7)
WBC NRBC COR # BLD AUTO: 7.59 10*3/MM3 (ref 3.4–10.8)

## 2025-06-18 PROCEDURE — 36415 COLL VENOUS BLD VENIPUNCTURE: CPT

## 2025-06-18 PROCEDURE — 85027 COMPLETE CBC AUTOMATED: CPT

## 2025-06-18 PROCEDURE — 80048 BASIC METABOLIC PNL TOTAL CA: CPT

## 2025-06-18 PROCEDURE — 84550 ASSAY OF BLOOD/URIC ACID: CPT

## 2025-06-18 PROCEDURE — 25810000003 LACTATED RINGERS PER 1000 ML: Performed by: INTERNAL MEDICINE

## 2025-06-18 PROCEDURE — 83735 ASSAY OF MAGNESIUM: CPT | Performed by: STUDENT IN AN ORGANIZED HEALTH CARE EDUCATION/TRAINING PROGRAM

## 2025-06-18 PROCEDURE — 97530 THERAPEUTIC ACTIVITIES: CPT

## 2025-06-18 PROCEDURE — 97165 OT EVAL LOW COMPLEX 30 MIN: CPT

## 2025-06-18 PROCEDURE — 82077 ASSAY SPEC XCP UR&BREATH IA: CPT

## 2025-06-18 PROCEDURE — 25810000003 SODIUM CHLORIDE 0.9 % SOLUTION

## 2025-06-18 PROCEDURE — 84443 ASSAY THYROID STIM HORMONE: CPT

## 2025-06-18 RX ORDER — AMLODIPINE BESYLATE 5 MG/1
2.5 TABLET ORAL 2 TIMES DAILY
Status: DISCONTINUED | OUTPATIENT
Start: 2025-06-18 | End: 2025-06-20

## 2025-06-18 RX ORDER — FUROSEMIDE 40 MG/1
40 TABLET ORAL DAILY
Status: DISCONTINUED | OUTPATIENT
Start: 2025-06-18 | End: 2025-06-25 | Stop reason: HOSPADM

## 2025-06-18 RX ORDER — HYDROCHLOROTHIAZIDE 12.5 MG/1
12.5 TABLET ORAL DAILY
Status: DISCONTINUED | OUTPATIENT
Start: 2025-06-18 | End: 2025-06-19

## 2025-06-18 RX ORDER — BISOPROLOL FUMARATE 5 MG/1
5 TABLET, FILM COATED ORAL DAILY
Status: DISCONTINUED | OUTPATIENT
Start: 2025-06-18 | End: 2025-06-25 | Stop reason: HOSPADM

## 2025-06-18 RX ORDER — FERROUS SULFATE 325(65) MG
325 TABLET ORAL DAILY
Status: DISCONTINUED | OUTPATIENT
Start: 2025-06-18 | End: 2025-06-25 | Stop reason: HOSPADM

## 2025-06-18 RX ORDER — BUDESONIDE AND FORMOTEROL FUMARATE DIHYDRATE 160; 4.5 UG/1; UG/1
2 AEROSOL RESPIRATORY (INHALATION)
Status: DISCONTINUED | OUTPATIENT
Start: 2025-06-18 | End: 2025-06-21

## 2025-06-18 RX ORDER — AMLODIPINE BESYLATE 2.5 MG/1
2.5 TABLET ORAL 2 TIMES DAILY
COMMUNITY
End: 2025-06-25 | Stop reason: HOSPADM

## 2025-06-18 RX ORDER — FLUTICASONE FUROATE AND VILANTEROL 200; 25 UG/1; UG/1
1 POWDER RESPIRATORY (INHALATION) NIGHTLY
COMMUNITY

## 2025-06-18 RX ORDER — HYDROCODONE BITARTRATE AND ACETAMINOPHEN 5; 325 MG/1; MG/1
1 TABLET ORAL EVERY 6 HOURS PRN
Refills: 0 | Status: COMPLETED | OUTPATIENT
Start: 2025-06-18 | End: 2025-06-18

## 2025-06-18 RX ORDER — BISOPROLOL FUMARATE 5 MG/1
5 TABLET, FILM COATED ORAL DAILY
COMMUNITY

## 2025-06-18 RX ORDER — TRAMADOL HYDROCHLORIDE 50 MG/1
50 TABLET ORAL EVERY 12 HOURS PRN
COMMUNITY
End: 2025-06-25 | Stop reason: HOSPADM

## 2025-06-18 RX ORDER — ATORVASTATIN CALCIUM 20 MG/1
10 TABLET, FILM COATED ORAL DAILY
Status: DISCONTINUED | OUTPATIENT
Start: 2025-06-18 | End: 2025-06-25 | Stop reason: HOSPADM

## 2025-06-18 RX ORDER — SERTRALINE HYDROCHLORIDE 100 MG/1
100 TABLET, FILM COATED ORAL DAILY
Status: DISCONTINUED | OUTPATIENT
Start: 2025-06-18 | End: 2025-06-25 | Stop reason: HOSPADM

## 2025-06-18 RX ORDER — IRBESARTAN 300 MG/1
300 TABLET ORAL DAILY
COMMUNITY
End: 2025-06-25 | Stop reason: HOSPADM

## 2025-06-18 RX ORDER — SODIUM CHLORIDE, SODIUM LACTATE, POTASSIUM CHLORIDE, CALCIUM CHLORIDE 600; 310; 30; 20 MG/100ML; MG/100ML; MG/100ML; MG/100ML
100 INJECTION, SOLUTION INTRAVENOUS CONTINUOUS
Status: DISCONTINUED | OUTPATIENT
Start: 2025-06-18 | End: 2025-06-19

## 2025-06-18 RX ORDER — FUROSEMIDE 40 MG/1
40 TABLET ORAL DAILY
COMMUNITY
End: 2025-06-25 | Stop reason: HOSPADM

## 2025-06-18 RX ORDER — PANTOPRAZOLE SODIUM 40 MG/1
40 TABLET, DELAYED RELEASE ORAL
Status: DISCONTINUED | OUTPATIENT
Start: 2025-06-18 | End: 2025-06-25 | Stop reason: HOSPADM

## 2025-06-18 RX ORDER — ALBUTEROL SULFATE 0.83 MG/ML
2.5 SOLUTION RESPIRATORY (INHALATION) 4 TIMES DAILY PRN
Status: DISCONTINUED | OUTPATIENT
Start: 2025-06-18 | End: 2025-06-25 | Stop reason: HOSPADM

## 2025-06-18 RX ORDER — POTASSIUM CHLORIDE 750 MG/1
10 TABLET, EXTENDED RELEASE ORAL 2 TIMES DAILY
COMMUNITY
End: 2025-06-25 | Stop reason: HOSPADM

## 2025-06-18 RX ORDER — LEVOTHYROXINE SODIUM 125 UG/1
125 TABLET ORAL DAILY
Status: DISCONTINUED | OUTPATIENT
Start: 2025-06-18 | End: 2025-06-25 | Stop reason: HOSPADM

## 2025-06-18 RX ADMIN — LEVOTHYROXINE SODIUM 125 MCG: 125 TABLET ORAL at 08:57

## 2025-06-18 RX ADMIN — FERROUS SULFATE TAB 325 MG (65 MG ELEMENTAL FE) 325 MG: 325 (65 FE) TAB at 08:57

## 2025-06-18 RX ADMIN — AMLODIPINE BESYLATE 2.5 MG: 5 TABLET ORAL at 12:19

## 2025-06-18 RX ADMIN — HYDROCODONE BITARTRATE AND ACETAMINOPHEN 1 TABLET: 5; 325 TABLET ORAL at 03:25

## 2025-06-18 RX ADMIN — HYDROCODONE BITARTRATE AND ACETAMINOPHEN 1 TABLET: 5; 325 TABLET ORAL at 22:35

## 2025-06-18 RX ADMIN — SERTRALINE HYDROCHLORIDE 100 MG: 100 TABLET, FILM COATED ORAL at 08:57

## 2025-06-18 RX ADMIN — ATORVASTATIN CALCIUM 10 MG: 20 TABLET, FILM COATED ORAL at 08:57

## 2025-06-18 RX ADMIN — PANTOPRAZOLE SODIUM 40 MG: 40 TABLET, DELAYED RELEASE ORAL at 05:21

## 2025-06-18 RX ADMIN — SODIUM CHLORIDE 100 ML/HR: 9 INJECTION, SOLUTION INTRAVENOUS at 02:08

## 2025-06-18 RX ADMIN — SODIUM CHLORIDE, POTASSIUM CHLORIDE, SODIUM LACTATE AND CALCIUM CHLORIDE 100 ML/HR: 600; 310; 30; 20 INJECTION, SOLUTION INTRAVENOUS at 12:19

## 2025-06-18 RX ADMIN — SODIUM CHLORIDE, POTASSIUM CHLORIDE, SODIUM LACTATE AND CALCIUM CHLORIDE 100 ML/HR: 600; 310; 30; 20 INJECTION, SOLUTION INTRAVENOUS at 23:37

## 2025-06-18 RX ADMIN — AMLODIPINE BESYLATE 2.5 MG: 5 TABLET ORAL at 20:57

## 2025-06-18 RX ADMIN — BISOPROLOL FUMARATE 5 MG: 5 TABLET ORAL at 12:19

## 2025-06-18 NOTE — H&P
Patient Name:  Candice Walker  YOB: 1935  MRN:  [id number]  Admit Date:  6/17/2025  Patient Care Team:  Provider, No Known as PCP - General      Subjective   History Present Illness     Chief Complaint   Patient presents with   • Urinary Frequency   • Urinary Retention   • Abnormal Lab       History of Present Illness    Ms. Walker is a 89-year-old female with a past medical history of hypertension, hyperlipidemia, anemia, GERD, and hypothyroidism  presents to [facility] ED due to abnormal labs.  Patient reports she is having generalized pain and increased urinary frequency at nighttime.  She reports that she is up to the bathroom approximately every 30 minutes at night.  She denies any fever, chills, dizziness, shortness of breath, chest pain, abdominal pain, nausea, vomiting, diarrhea, or dysuria.  She reports that she has had headache but attributes that to cervical pain.  She reports that she has been eating normally except for today she has only eaten a chicken leg prior to coming to the hospital.  She reports that the SCDs make her legs hurt worse.  She is alert and oriented x 3.    Review of Systems   Constitutional:  Negative for appetite change, chills and fever.   Respiratory:  Negative for shortness of breath.    Cardiovascular:  Negative for chest pain.   Gastrointestinal:  Negative for abdominal pain, diarrhea, nausea and vomiting.   Genitourinary:  Positive for frequency and urgency. Negative for difficulty urinating and dysuria.   Musculoskeletal:  Positive for arthralgias (Generalized pain) and neck pain.   Neurological:  Positive for headaches. Negative for dizziness.        Personal History     Past Medical History:   Diagnosis Date   • Anemia    • Anxiety    • Arthritis    • Cancer 2006    BREAST CANCER, RIGHT MASTECTOMY   • Chronic bronchitis    • Depression    • Disease of thyroid gland    • GERD (gastroesophageal reflux disease)    • Hard of hearing    •  Heart murmur    • History of kidney stones    • History of UTI 2017    HAD ABX   • Hyperlipidemia    • Hypertension    • MRSA (methicillin resistant Staphylococcus aureus) infection s    EARLY , HERE AT [facility], LEFT KNEE  , D/W GISELLE IN INFECTION CONTROL   • RBBB    • Risk factors for obstructive sleep apnea    • Shoulder pain     LEFT     Past Surgical History:   Procedure Laterality Date   • APPENDECTOMY     • CATARACT EXTRACTION      WITH LENS IMPLANT   • CHOLECYSTECTOMY     • CYST REMOVAL      ON SPINE   • EYE SURGERY     • HYSTERECTOMY     • JOINT REPLACEMENT      LT KNEE   • MASTECTOMY Right 2006   • MASTECTOMY RADICAL WITH AXILLARY NODE DISSECTION Right    • THYROIDECTOMY     • TOTAL SHOULDER ARTHROPLASTY W/ DISTAL CLAVICLE EXCISION Left 2017    Procedure: LT TOTAL SHOULDER REVERSE ARTHROPLASTY;  Surgeon: Ezequiel Elizalde MD;  Location: [facility] OR [facility];  Service:      History reviewed. No pertinent family history.  Social History     Tobacco Use   • Smoking status: Former     Current packs/day: 0.00     Average packs/day: 0.5 packs/day for 35.0 years (17.5 ttl pk-yrs)     Types: Cigarettes     Start date: 1960     Quit date: 1995     Years since quittin.3   Substance Use Topics   • Alcohol use: Yes     Alcohol/week: 14.0 standard drinks of alcohol     Types: 14 Shots of liquor per week     Comment: TWO DAILY  2 shots daily   • Drug use: No     No current facility-administered medications on file prior to encounter.     Current Outpatient Medications on File Prior to Encounter   Medication Sig Dispense Refill   • albuterol (PROVENTIL HFA;VENTOLIN HFA) 108 (90 BASE) MCG/ACT inhaler Inhale 2 puffs Every 4 (Four) Hours As Needed for Wheezing. 6.7 g 2   • amLODIPine (NORVASC) 2.5 MG tablet Take 1 tablet by mouth 2 (Two) Times a Day.     • bisoprolol (ZEBeta) 5 MG tablet Take 1 tablet by mouth Daily.     • Ferrous Sulfate (IRON) 325 (65 FE) MG tablet Take 1 tablet by mouth Daily.     •  Fluticasone Furoate-Vilanterol (Breo Ellipta) 200-25 MCG/ACT inhaler Inhale 1 puff Every Night.     • furosemide (LASIX) 40 MG tablet Take 1 tablet by mouth Daily.     • hydrochlorothiazide (HYDRODIURIL) 12.5 MG tablet Take 1 tablet by mouth Daily.     • irbesartan (AVAPRO) 300 MG tablet Take 1 tablet by mouth Daily.     • levothyroxine (SYNTHROID, LEVOTHROID) 125 MCG tablet Take 1 tablet by mouth Daily.     • omeprazole (priLOSEC) 20 MG capsule Take 1 capsule by mouth Daily.     • potassium chloride 10 MEQ CR tablet Take 1 tablet by mouth 2 (Two) Times a Day.     • sertraline (ZOLOFT) 100 MG tablet Take 1 tablet by mouth Daily.     • simvastatin (ZOCOR) 20 MG tablet Take 1 tablet by mouth Every Night.     • traMADol (ULTRAM) 50 MG tablet Take 1 tablet by mouth Every 12 (Twelve) Hours As Needed for Moderate Pain.     • amLODIPine-valsartan (EXFORGE) 5-160 MG per tablet Take 1 tablet by mouth Daily.     • amoxicillin-clavulanate (AUGMENTIN) 875-125 MG per tablet Take 1 tablet by mouth 2 (Two) Times a Day. 14 tablet 0   • calcium carbonate (OS-RYAN) 600 MG tablet Take 1 tablet by mouth 2 (Two) Times a Day.     • celecoxib (CeleBREX) 200 MG capsule Take 1 capsule by mouth Daily. HOLD PER MD INSTR , LAST DOSE 3/29/17     • Multiple Vitamins-Minerals (ONE-A-DAY 50 PLUS PO) Take 1 tablet by mouth Daily. STOPPED PREOP, LAST DOSE 3/29/17     • oxyCODONE-acetaminophen (PERCOCET) 5-325 MG per tablet Take 2 tablets by mouth Every 4 (Four) Hours As Needed for Moderate Pain (4-6). 40 tablet 0   • zolpidem (AMBIEN) 10 MG tablet Take 0.5 tablets by mouth Every Night.       Allergies   Allergen Reactions   • Cefaclor Other (See Comments)     Does not remember   • Penicillins Other (See Comments)     Does not remember   • Sulfa Antibiotics Other (See Comments)     Does not remember   • Nickel Itching     Has to wear nickel free       Objective    Objective     Vital Signs  Temp:  [97.1 °F (36.2 °C)-97.7 °F (36.5 °C)] 97.1 °F (36.2  °C)  Heart Rate:  [56-85] 72  Resp:  [18-22] 18  BP: (133-166)/() 146/71  SpO2:  [90 %-97 %] 94 %  on   ;   Device (Oxygen Therapy): room air  Body mass index is 25.88 kg/m².    Physical Exam  Vitals and nursing note reviewed.   Constitutional:       General: She is not in acute distress.     Appearance: She is not toxic-appearing.   Cardiovascular:      Rate and Rhythm: Normal rate and regular rhythm.      Pulses: Normal pulses.      Heart sounds: Murmur heard.   Pulmonary:      Effort: Pulmonary effort is normal. No respiratory distress.      Breath sounds: Normal breath sounds.   Abdominal:      General: Bowel sounds are normal. There is no distension.      Palpations: Abdomen is soft.      Tenderness: There is no abdominal tenderness.   Musculoskeletal:      Right lower le+ Edema present.      Left lower le+ Edema present.      Comments: Arthritic changes to bilateral hands   Skin:     General: Skin is warm and dry.      Coloration: Skin is pale.   Neurological:      Mental Status: She is alert.         Results Review:  I reviewed the patient's new clinical results.  I reviewed the patient's new imaging results.  I reviewed the patient's other test results.  Discussed with ED provider.    Lab Results (last 24 hours)       Procedure Component Value Units Date/Time    COMPREHENSIVE METABOLIC PANEL [[id number]]  (Abnormal) Collected: 25 1300     Updated: 25    CBC (NO DIFF) [[id number]]  (Abnormal) Collected: 25 1300     Updated: 25    CBC & Differential [[id number]]  (Abnormal) Collected: 25    Specimen: Blood Updated: 25    Narrative:      The following orders were created for panel order CBC & Differential.  Procedure                               Abnormality         Status                     ---------                               -----------         ------                     CBC Auto Differential[[id number]]        Abnormal            Final  result                 Please view results for these tests on the individual orders.    Comprehensive Metabolic Panel [[id number]]  (Abnormal) Collected: 06/17/25 2125    Specimen: Blood Updated: 06/17/25 2209     Glucose 78 mg/dL      BUN 39.0 mg/dL      Creatinine 1.47 mg/dL      Sodium 126 mmol/L      Potassium 3.5 mmol/L      Chloride 93 mmol/L      CO2 17.6 mmol/L      Calcium 8.4 mg/dL      Total Protein 6.8 g/dL      Albumin 3.5 g/dL      ALT (SGPT) 10 U/L      AST (SGOT) 14 U/L      Alkaline Phosphatase 145 U/L      Total Bilirubin 0.4 mg/dL      Globulin 3.3 gm/dL      A/G Ratio 1.1 g/dL      BUN/Creatinine Ratio 26.5     Anion Gap 15.4 mmol/L      eGFR 34.0 mL/min/1.73     Narrative:      GFR Categories in Chronic Kidney Disease (CKD)              GFR Category          GFR (mL/min/1.73)    Interpretation  G1                    90 or greater        Normal or high (1)  G2                    60-89                Mild decrease (1)  G3a                   45-59                Mild to moderate decrease  G3b                   30-44                Moderate to severe decrease  G4                    15-29                Severe decrease  G5                    14 or less           Kidney failure    (1)In the absence of evidence of kidney disease, neither GFR category G1 or G2 fulfill the criteria for CKD.    eGFR calculation 2021 CKD-EPI creatinine equation, which does not include race as a factor    CBC Auto Differential [[id number]]  (Abnormal) Collected: 06/17/25 2125    Specimen: Blood Updated: 06/17/25 2150     WBC 6.91 10*3/mm3      RBC 3.45 10*6/mm3      Hemoglobin 9.6 g/dL      Hematocrit 30.0 %      MCV 87.0 fL      MCH 27.8 pg      MCHC 32.0 g/dL      RDW 14.3 %      RDW-SD 45.6 fl      MPV 9.3 fL      Platelets 270 10*3/mm3      Neutrophil % 71.1 %      Lymphocyte % 11.3 %      Monocyte % 12.4 %      Eosinophil % 3.8 %      Basophil % 0.7 %      Immature Grans % 0.7 %      Neutrophils, Absolute 4.91 10*3/mm3       Lymphocytes, Absolute 0.78 10*3/mm3      Monocytes, Absolute 0.86 10*3/mm3      Eosinophils, Absolute 0.26 10*3/mm3      Basophils, Absolute 0.05 10*3/mm3      Immature Grans, Absolute 0.05 10*3/mm3      nRBC 0.0 /100 WBC     Magnesium [[id number]]  (Abnormal) Collected: 06/17/25 2125    Specimen: Blood Updated: 06/17/25 2209     Magnesium 1.0 mg/dL     Urinalysis With Culture If Indicated - Urine, Catheter [[id number]]  (Abnormal) Collected: 06/17/25 2201    Specimen: Urine, Catheter Updated: 06/17/25 2221     Color, UA Yellow     Appearance, UA Clear     pH, UA 5.5     Specific Gravity, UA 1.007     Glucose, UA Negative     Ketones, UA Negative     Bilirubin, UA Negative     Blood, UA Negative     Protein, UA Negative     Leuk Esterase, UA Negative     Nitrite, UA Positive     Urobilinogen, UA 0.2 E.U./dL    Narrative:      In absence of clinical symptoms, the presence of pyuria, bacteria, and/or nitrites on the urinalysis result does not correlate with infection.    Urinalysis, Microscopic Only - Urine, Catheter [[id number]]  (Abnormal) Collected: 06/17/25 2201    Specimen: Urine, Catheter Updated: 06/17/25 2224     RBC, UA 0-2 /HPF      WBC, UA 3-5 /HPF      Comment: Urine culture not indicated.        Bacteria, UA 4+ /HPF      Squamous Epithelial Cells, UA 0-2 /HPF      Hyaline Casts, UA 0-2 /LPF      Methodology Automated Microscopy    Osmolality, Urine - Urine, Catheter [[id number]] Collected: 06/17/25 2201    Specimen: Urine, Catheter Updated: 06/18/25 0135     Osmolality, Urine 270 mOsm/kg     Narrative:      Osmo Normal Reference Ranges:    Random:  mOsm/kg H2O, depending on fluid intake.  Random: >850 mOsm/kg H20, after 12 hour fluid restriction.    24 Hour: 300-900 mOsm/kg H2O.    Sodium, Urine, Random - Urine, Catheter [[id number]] Collected: 06/17/25 2201    Specimen: Urine, Catheter Updated: 06/18/25 0122     Sodium, Urine 52 mmol/L     Narrative:      Reference intervals for random urine have  not been established.  Clinical usage is dependent upon physician's interpretation in combination with other laboratory tests.               Imaging Results (Last 24 Hours)       ** No results found for the last 24 hours. **            Results for orders placed during the hospital encounter of 07/26/19    Adult Transthoracic Echo Complete W/ Cont if Necessary Per Protocol    Interpretation Summary  · Left ventricular wall thickness is consistent with mild concentric hypertrophy.  · Estimated EF = 61%.  · Left ventricular systolic function is normal.  · There is moderate calcification of the aortic valve.  · Left atrial cavity size is moderately dilated.  · Left ventricular diastolic dysfunction (grade II) consistent with pseudonormalization.  · Mild mitral valve regurgitation is present  · Mild tricuspid valve regurgitation is present.  · Calculated right ventricular systolic pressure from tricuspid regurgitation is 56.0 mmHg.    No orders to display     Assessment/Plan   Assessment & Plan   Active Hospital Problems    Diagnosis  POA   • **PHUONG (acute kidney injury) [N17.9]  Yes   • COPD (chronic obstructive pulmonary disease) [J44.9]  Yes   • Alcohol use [F10.90]  Yes   • Acute UTI (urinary tract infection) [N39.0]  Yes   • Hyponatremia [E87.1]  Yes   • Hypomagnesemia [E83.42]  Yes   • Hypertension [I10]  Yes   • Anemia [D64.9]  Yes      Resolved Hospital Problems   No resolved problems to display.       Ms. Walker is a 89-year-old female with a past medical history of hypertension, hyperlipidemia, anemia, GERD, and hypothyroidism  presents to [facility] ED due to abnormal labs.  Patient reports she is having generalized pain and increased urinary frequency at nighttime.  She reports that she is up to the bathroom approximately every 30 minutes at night.  She reports that she has been eating and drinking normally at home except for today due to having to come to the hospital.  Her granddaughter and  family live with the patient and her daughter and provide assistance at home.  Patient is ambulatory with a walker at home.  Patient reports that she drinks bourbon, but not every night.      Acute kidney injury  Hyponatremia  -  - BUN elevated at 39  -Creatinine 1.47, previous creatinine 0.86 from 1/18/2023  -GFR 34  -Normal saline bolus, followed by IV fluids overnight  -Will check urine sodium, urine osmolality, TSH , uric acid  -Check BMP in the morning  - Daily weights  -I&O    Acute UTI  - Urinalysis nitrite positive, with 4+ bacteria, and 3-5 WBC  - Patient allergic to Ceclor, penicillin, and sulfa, started on Macrobid in the ED, will continue  -Acute urinary retention protocol    Hypomagnesia  - Magnesium low  -Replace per protocol    COPD  -Current pulse oximetry 94% on room air  -She has Breo Ellipta and albuterol inhaler at home, will continue  -Continuous pulse oximetry  -Supplemental oxygen as needed    Alcohol use  - Will monitor CIWA scale    Anemia  -Hgb 9.6, previous Hgb 9.5 from 7/18/2024  -Currently on ferrous sulfate at home, will continue  -Check CBC in the morning    Hypertension  -Recent blood pressure 133/67, stable  - Takes amlodipine, Avapro, bisoprolol at home  - Monitor blood pressure      Medications reviewed.  Discussed CODE STATUS with patient.  Patient would like to have chest compressions but no intubation.    SCDs for DVT prophylaxis.  Limited code - chest compressions, no intubation.  Discussed with patient.      ALVAREZ Yoder  [facility]  06/18/25  01:58 EDT

## 2025-06-18 NOTE — PLAN OF CARE
Goal Outcome Evaluation:  Plan of Care Reviewed With: patient        Progress: no change  Outcome Evaluation: VSS, on room air - pt denies O2 and refuses pulse ox. Irritable this AM but remained calm the rest of the shift. Highest CIWA score is 2. IVFs per order, up to [facility] w/ assist x1 and gait belt. Had BM this shift and voiding without issue. Oriented x3. Falls precautions maintained. Pt and her daugther updated on plan of care.

## 2025-06-18 NOTE — NURSING NOTE
Patient on room air, Oxygen Sat dropped between 86-88 at times, refused O2 therapy, refused pulse oximeter and SCDs.

## 2025-06-18 NOTE — PLAN OF CARE
Goal Outcome Evaluation:  Plan of Care Reviewed With: patient           Outcome Evaluation: Pt is an 89 year old female admit for PHUONG, UTI. PMHx significant for HTN, hyperlipidemia, anemia, GERD, hypothyroidism. Pt lives at home with granddaughter, daughter, addtl family members where she is typically IND with ADLs and uses rollator. Pt this date able to stand with CGA of 1 person at RW and take a few side steps, limited by R knee pain. Pt with concern for balance, strength, activity tolerance, in addition to pain impacting baseline IND. IPOT to continue to follow, pt may benefit from CHEVY at discharge pending ongoing progress and availability of family assist.    Anticipated Discharge Disposition (OT): skilled nursing facility

## 2025-06-18 NOTE — CASE MANAGEMENT/SOCIAL WORK
Discharge Planning Assessment  [facility]     Patient Name: Candice Walker  MRN: [id number]  Today's Date: 6/18/2025    Admit Date: 6/17/2025    Plan: SNF vs Home with family.   Discharge Needs Assessment       Row Name 06/18/25 1428       Living Environment    People in Home child(greg), adult;child(greg), dependent;other relative(s)    Current Living Arrangements home    Primary Care Provided by child(greg)    Provides Primary Care For no one, unable/limited ability to care for self    Family Caregiver if Needed child(greg), adult    Family Caregiver Names Paty [phone]    Quality of Family Relationships helpful;involved       Transition Planning    Patient/Family Anticipates Transition to inpatient rehabilitation facility    Patient/Family Anticipated Services at Transition skilled nursing;rehabilitation services    Transportation Anticipated family or friend will provide       Discharge Needs Assessment    Readmission Within the Last 30 Days no previous admission in last 30 days    Concerns to be Addressed discharge planning                   Discharge Plan       Row Name 06/18/25 4885       Plan    Plan SNF vs Home with family.    Patient/Family in Agreement with Plan yes    Plan Comments [facility] letter checked. Met with patient at bedside who is extremely hard of hearing. Patient deferred CCP to her daughter-Paty [phone]. Spoke with Paty via phone, introduced self and explained CCP role. Verified facesheet and PCP- Heena Phan. Patient lives at home with daughter, granddaughter and other family members who assist with care as needed. Family stated they are interested in patient going to [place] for SNF but need to speak with patient about it before referrals are sent. Patient required assistance with ADLS prior to admission. Patient has used [facility] in the past and been to [facility]. Patient has a rolator. Patient uses [facility] pharmacy and has no trouble affording medications. Family will  transport at [place]. [facility] to follow.                    Expected Discharge Date and Time       Expected Discharge Date Expected Discharge Time    Jun 22, 2025            Demographic Summary       Row Name 06/18/25 1428       General Information    Admission Type inpatient    Referral Source admission list    Reason for Consult discharge planning    Preferred Language English                   Functional Status       Row Name 06/18/25 1428       Functional Status    Usual Activity Tolerance good    Current Activity Tolerance good       Functional Status, IADL    Medications assistive person    Meal Preparation assistive person    Housekeeping assistive person    Laundry assistive person    Shopping assistive person    If for any reason you need help with day-to-day activities such as bathing, preparing meals, shopping, managing finances, etc., do you get the help you need? I could use a little more help                   Psychosocial    No documentation.                  Abuse/Neglect    No documentation.                  Legal    No documentation.                  Substance Abuse    No documentation.                  Patient Forms    No documentation.                     Jimi Santos RN

## 2025-06-18 NOTE — PLAN OF CARE
Goal Outcome Evaluation:  Plan of Care Reviewed With: patient           Outcome Evaluation: New admission from ED. Alert and oriented X3. History and  medication info provided by daughter Paty. Blanchable redness at the bottom, skin excoriated at right  abd fold, barrier cream applied. Small bruise left lateral trunk.  Irritable and agitated at times. CIWA score - 4. Up with assistX2 to [facility] with gait belt and walker. Unsteady due right knee pain, swelling  noted. Voided freely to clear yellow urine, had small  formed BM. Norco given for pain. Made comfortable.

## 2025-06-18 NOTE — THERAPY EVALUATION
Patient Name: Candice Walker  : 1935    MRN: [id number]                              Today's Date: 2025       Admit Date: 2025    Visit Dx:     ICD-10-CM ICD-9-CM   1. Urinary tract infection without hematuria, site unspecified  N39.0 599.0   2. Hyponatremia  E87.1 276.1   3. PHUONG (acute kidney injury)  N17.9 584.9   4. Altered mental status, unspecified altered mental status type  R41.82 780.97     Patient Active Problem List   Diagnosis    S/p reverse total shoulder arthroplasty    Acute UTI (urinary tract infection)    Hyponatremia    PHUONG (acute kidney injury)    Hypomagnesemia    Hypertension    Anemia    COPD (chronic obstructive pulmonary disease)    Alcohol use     Past Medical History:   Diagnosis Date    Anemia     Anxiety     Arthritis     Cancer 2006    BREAST CANCER, RIGHT MASTECTOMY    Chronic bronchitis     Depression     Disease of thyroid gland     GERD (gastroesophageal reflux disease)     Hard of hearing     Heart murmur     History of kidney stones     History of UTI 2017    HAD ABX    Hyperlipidemia     Hypertension     MRSA (methicillin resistant Staphylococcus aureus) infection s    EARLY , HERE AT [facility], LEFT KNEE  , D/W GISELLE IN INFECTION CONTROL    RBBB     Risk factors for obstructive sleep apnea     Shoulder pain     LEFT     Past Surgical History:   Procedure Laterality Date    APPENDECTOMY      CATARACT EXTRACTION      WITH LENS IMPLANT    CHOLECYSTECTOMY      CYST REMOVAL      ON SPINE    EYE SURGERY      HYSTERECTOMY      JOINT REPLACEMENT      LT KNEE    MASTECTOMY Right 2006    MASTECTOMY RADICAL WITH AXILLARY NODE DISSECTION Right     THYROIDECTOMY      TOTAL SHOULDER ARTHROPLASTY W/ DISTAL CLAVICLE EXCISION Left 2017    Procedure: LT TOTAL SHOULDER REVERSE ARTHROPLASTY;  Surgeon: Ezequiel Elizalde MD;  Location: [facility] OR [facility];  Service:       General Information       Row Name 25 0945          OT Time and Intention    Subjective Information  complains of;pain  -HE     Document Type evaluation  -HE     Mode of Treatment individual therapy;occupational therapy  -HE     Patient Effort good  -HE     Symptoms Noted During/After Treatment none  -       Row Name 06/18/25 0945          General Information    Patient Profile Reviewed yes  -HE     Prior Level of Function independent:;ADL's  -HE     Existing Precautions/Restrictions fall  -HE     Barriers to Rehab none identified  -       Row Name 06/18/25 0945          Living Environment    Current Living Arrangements home  -HE     People in Home child(greg), adult;grandchild(greg);other relative(s)  -       Row Name 06/18/25 0945          Home Main Entrance    Number of Stairs, Main Entrance none  per pt report  -       Row Name 06/18/25 0945          Cognition    Orientation Status (Cognition) oriented to;person;place;situation  -       Row Name 06/18/25 0945          Safety Issues/Impairments Affecting Functional Mobility    Impairments Affecting Function (Mobility) balance;endurance/activity tolerance;strength;pain;range of motion (ROM)  -HE               User Key  (r) = Recorded By, (t) = Taken By, (c) = Cosigned By      Initials Name Provider Type    HE Mamie Thomas OT Occupational Therapist                     Mobility/ADL's       Row Name 06/18/25 0999          Bed Mobility    Bed Mobility supine-sit;sit-supine  -HE     Supine-Sit [place] (Bed Mobility) standby assist  -HE     Sit-Supine [place] (Bed Mobility) minimum assist (75% patient effort);verbal cues  -HE     Comment, (Bed Mobility) Min A at BLE to return to supine  -       Row Name 06/18/25 0946          Transfers    Transfers sit-stand transfer;stand-sit transfer  -       Row Name 06/18/25 0946          Sit-Stand Transfer    Sit-Stand [place] (Transfers) contact guard;verbal cues  -HE     Assistive Device (Sit-Stand Transfers) walker, front-wheeled  -       Row Name 06/18/25 0946          Stand-Sit Transfer     "Stand-Sit [place] (Transfers) contact guard;verbal cues  -HE     Assistive Device (Stand-Sit Transfers) walker, front-wheeled  -       Row Name 06/18/25 0946          Functional Mobility    Functional Mobility- Ind. Level contact guard assist  -HE     Functional Mobility- Device walker, front-wheeled  -HE     Functional Mobility- Comment able to take few small steps with CGA and RW to progress capacity for OOB activity, encouraged further mobility, limited 2/2 increased pain  -               User Key  (r) = Recorded By, (t) = Taken By, (c) = Cosigned By      Initials Name Provider Type    Mamie Traylor OT Occupational Therapist                   Obj/Interventions       Row Name 06/18/25 0947          Sensory Assessment (Somatosensory)    Sensory Assessment pt reports feet always feel \"cold\"  -       Row Name 06/18/25 0947          Vision Assessment/Intervention    Visual Impairment/Limitations WFL  -[facility] Name 06/18/25 0947          Range of Motion Comprehensive    Comment, General Range of Motion arthritic changes to rylie hands, proximally WFL  -[facility] Name 06/18/25 0947          Strength Comprehensive (MMT)    Comment, General Manual Muscle Testing (MMT) Assessment grossly 3+/5  -[facility] Name 06/18/25 0947          Balance    Comment, Balance CGA with RW  -               User Key  (r) = Recorded By, (t) = Taken By, (c) = Cosigned By      Initials Name Provider Type    Mamie Traylor OT Occupational Therapist                   Goals/Plan       [facility] Name 06/18/25 0940          Transfer Goal 1 (OT)    Activity/Assistive Device (Transfer Goal 1, OT) bed-to-chair/chair-to-bed;toilet  -     [place] Level/Cues Needed (Transfer Goal 1, OT) supervision required  -HE     Time Frame (Transfer Goal 1, OT) 2 weeks  -HE     Progress/Outcome (Transfer Goal 1, OT) goal ongoing  -       Row Name 06/18/25 0951          Dressing Goal 1 (OT)    Activity/Device (Dressing Goal 1, OT) upper " body dressing;lower body dressing  -HE     [place]/Cues Needed (Dressing Goal 1, OT) supervision required  -HE     Time Frame (Dressing Goal 1, OT) 2 weeks  -HE     Progress/Outcome (Dressing Goal 1, OT) new goal  -HE       Row Name 06/18/25 0951          Toileting Goal 1 (OT)    Activity/Device (Toileting Goal 1, OT) toileting skills, all  -HE     [place] Level/Cues Needed (Toileting Goal 1, OT) standby assist  -HE     Time Frame (Toileting Goal 1, OT) 2 weeks  -HE     Progress/Outcome (Toileting Goal 1, OT) new goal  -HE       Row Name 06/18/25 0951          Grooming Goal 1 (OT)    Activity/Device (Grooming Goal 1, OT) grooming skills, all  -HE     [place] (Grooming Goal 1, OT) supervision required  -HE     Time Frame (Grooming Goal 1, OT) 2 weeks  -HE     Progress/Outcome (Grooming Goal 1, OT) new goal  -HE       Row Name 06/18/25 0954          Therapy Assessment/Plan (OT)    Planned Therapy Interventions (OT) activity tolerance training;BADL retraining;functional balance retraining;transfer/mobility retraining;strengthening exercise;ROM/therapeutic exercise;occupation/activity based interventions;patient/caregiver education/training  -HE               User Key  (r) = Recorded By, (t) = Taken By, (c) = Cosigned By      Initials Name Provider Type    Mamie Traylor, OT Occupational Therapist                   Clinical Impression       Row Name 06/18/25 0902          Pain Assessment    Pre/Posttreatment Pain Comment pt with c/o pain in R knee, did not quantify  -HE       Row Name 06/18/25 0931          Plan of Care Review    Plan of Care Reviewed With patient  -HE     Outcome Evaluation Pt is an 89 year old female admit for PHUONG, UTI. PMHx significant for HTN, hyperlipidemia, anemia, GERD, hypothyroidism. Pt lives at home with granddaughter, daughter, addtl family members where she is typically IND with ADLs and uses rollator. Pt this date able to stand with CGA of 1 person at  and take a few  side steps, limited by R knee pain. Pt with concern for balance, strength, activity tolerance, in addition to pain impacting baseline IND. IPOT to continue to follow, pt may benefit from CHEVY at discharge pending ongoing progress and availability of family assist.  -       Row Name 06/18/25 0948          Therapy Assessment/Plan (OT)    Rehab Potential (OT) good  -HE     Criteria for Skilled Therapeutic Interventions Met (OT) yes  -HE     Therapy Frequency (OT) 5 times/wk  -       Row Name 06/18/25 0948          Therapy Plan Review/Discharge Plan (OT)    Anticipated Discharge Disposition (OT) skilled nursing facility  -       Row Name 06/18/25 0948          Positioning and Restraints    Pre-Treatment Position in bed  -HE     Post Treatment Position bed  -HE     In Bed fowlers;call light within reach;encouraged to call for assist;exit alarm on  -HE               User Key  (r) = Recorded By, (t) = Taken By, (c) = Cosigned By      Initials Name Provider Type    HE Mamie Thomas, OT Occupational Therapist                   Outcome Measures       Row Name 06/18/25 0952          How much help from another is currently needed...    Putting on and taking off regular lower body clothing? 3  -HE     Bathing (including washing, rinsing, and drying) 3  -HE     Toileting (which includes using toilet bed pan or urinal) 3  -HE     Putting on and taking off regular upper body clothing 3  -HE     Taking care of personal grooming (such as brushing teeth) 3  -HE     Eating meals 3  -HE     AM-PAC 6 Clicks Score (OT) 18  -HE       Row Name 06/18/25 0119          How much help from another person do you currently need...    Turning from your back to your side while in flat bed without using bedrails? 3  -MS     Moving from lying on back to sitting on the side of a flat bed without bedrails? 3  -MS     Moving to and from a bed to a chair (including a wheelchair)? 3  -MS     Standing up from a chair using your arms (e.g., wheelchair,  bedside chair)? 3  -MS     Climbing 3-5 steps with a railing? 2  -MS     To walk in hospital room? 3  -MS     AM-PAC 6 Clicks Score (PT) 17  -MS       Row Name 06/18/25 0952          Modified [place] Scale    Modified Dana Scale 4 - Moderately severe disability.  Unable to walk without assistance, and unable to attend to own bodily needs without assistance.  -ANABEL       Row Name 06/18/25 0952          Functional Assessment    Outcome Measure Options AM-PAC 6 Clicks Daily Activity (OT);Modified [place]  -HE               User Key  (r) = Recorded By, (t) = Taken By, (c) = Cosigned By      Initials Name Provider Type    MS CardosoCandice, RN Registered Nurse    Mamie Traylor OT Occupational Therapist                    Occupational Therapy Education       Title: PT OT SLP Therapies (Done)       Topic: Occupational Therapy (Done)       Point: ADL training (Done)       Learning Progress Summary            Patient Acceptance, E, VU by  at 6/18/2025 0952    Comment: Pt educated on goals of session, role of OT, OT POC.                      Point: Home exercise program (Done)       Learning Progress Summary            Patient Acceptance, E, VU by  at 6/18/2025 0952    Comment: Pt educated on goals of session, role of OT, OT POC.                      Point: Precautions (Done)       Learning Progress Summary            Patient Acceptance, E, VU by  at 6/18/2025 0952    Comment: Pt educated on goals of session, role of OT, OT POC.                      Point: Body mechanics (Done)       Learning Progress Summary            Patient Acceptance, E, VU by  at 6/18/2025 0952    Comment: Pt educated on goals of session, role of OT, OT POC.                                      User Key       Initials Effective Dates Name Provider Type Discipline     02/18/25 -  Mamie Thomas OT Occupational Therapist OT                  OT Recommendation and Plan  Planned Therapy Interventions (OT): activity tolerance training, BADL  retraining, functional balance retraining, transfer/mobility retraining, strengthening exercise, ROM/therapeutic exercise, occupation/activity based interventions, patient/caregiver education/training  Therapy Frequency (OT): 5 times/wk  Plan of Care Review  Plan of Care Reviewed With: patient  Outcome Evaluation: Pt is an 89 year old female admit for PHUONG, UTI. PMHx significant for HTN, hyperlipidemia, anemia, GERD, hypothyroidism. Pt lives at home with granddaughter, daughter, addtl family members where she is typically IND with ADLs and uses rollator. Pt this date able to stand with CGA of 1 person at RW and take a few side steps, limited by R knee pain. Pt with concern for balance, strength, activity tolerance, in addition to pain impacting baseline IND. IPOT to continue to follow, pt may benefit from CHEVY at discharge pending ongoing progress and availability of family assist.     Time Calculation:   Evaluation Complexity (OT)  Review Occupational Profile/Medical/Therapy History Complexity: brief/low complexity  Assessment, Occupational Performance/Identification of Deficit Complexity: 1-3 performance deficits  Clinical Decision Making Complexity (OT): problem focused assessment/low complexity  Overall Complexity of Evaluation (OT): low complexity     Time Calculation- OT       Row Name 06/18/25 0953             Time Calculation- OT    OT Start Time 0916  -HE      OT Stop Time 0941  -HE      OT Time Calculation (min) 25 min  -HE      OT Received On 06/18/25  -HE      OT - Next Appointment 06/19/25  -HE      OT Goal Re-Cert Due Date 07/02/25  -HE         Timed Charges    10672 - OT Therapeutic Activity Minutes 15  -HE         Untimed Charges    OT Eval/Re-eval Minutes 10  -HE         Total Minutes    Timed Charges Total Minutes 15  -HE      Untimed Charges Total Minutes 10  -HE       Total Minutes 25  -HE                User Key  (r) = Recorded By, (t) = Taken By, (c) = Cosigned By      Initials Name Provider Type     HE Mamie Thomas OT Occupational Therapist                  Therapy Charges for Today       Code Description Service Date Service Provider Modifiers Qty    16209250510  OT THERAPEUTIC ACT EA 15 MIN 6/18/2025 Mamie Thomas OT GO 1    [id number]  OT EVAL LOW COMPLEXITY 2 6/18/2025 Mamie Thomas OT GO 1                 Mamie Thomas OT  6/18/2025

## 2025-06-18 NOTE — ED PROVIDER NOTES
" EMERGENCY DEPARTMENT ENCOUNTER  Room Number:  11/11  PCP: Provider, No Known  Independent Historians: Patient and EMS      HPI:  Chief Complaint: Urinary retention and abnormal outpatient labs    A complete HPI/ROS/PMH/PSH/SH/FH are unobtainable due to: Poor historian    Chronic or social conditions impacting patient care (Social Determinants of Health): None      Context: Candice Walker is a 89 y.o. female with a medical history of arthritis, anemia, hypertension, hyperlipidemia and anxiety who presents to the ED c/o acute abnormal urinary patterns as well as head discomfort and \"pain from my head to my toes.\"  Patient reportedly had outpatient labs performed this week which showed low sodium levels.  Therefore she was transported from her facility to our location for further evaluation.  She denies abdominal pain.  Denies chest pain or difficulties breathing.  Denies nausea or vomiting.      Review of prior external notes (non-ED) -and- Review of prior external test results outside of this encounter: I independently reviewed the hospitalist discharge summary from April 15, 2025.  Patient was admitted at [facility] at that time for acute hypoxemic respiratory failure with COPD exacerbation.    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     Resolved Ambulatory Problems     Diagnosis Date Noted    No Resolved Ambulatory Problems     Past Medical History:   Diagnosis Date    Anemia     Anxiety     Arthritis     Cancer 2006    Chronic bronchitis     Depression     Disease of thyroid gland     GERD (gastroesophageal reflux disease)     Hard of hearing     Heart murmur     History of kidney stones     History of UTI 02/2017    Hyperlipidemia     Hypertension     MRSA (methicillin resistant Staphylococcus aureus) infection "     RBBB     Risk factors for obstructive sleep apnea     Shoulder pain          PAST SURGICAL HISTORY  Past Surgical History:   Procedure Laterality Date    APPENDECTOMY      CATARACT EXTRACTION      WITH LENS IMPLANT    CHOLECYSTECTOMY      CYST REMOVAL      ON SPINE    EYE SURGERY      HYSTERECTOMY      JOINT REPLACEMENT      LT KNEE    MASTECTOMY Right 2006    MASTECTOMY RADICAL WITH AXILLARY NODE DISSECTION Right     THYROIDECTOMY      TOTAL SHOULDER ARTHROPLASTY W/ DISTAL CLAVICLE EXCISION Left 2017    Procedure: LT TOTAL SHOULDER REVERSE ARTHROPLASTY;  Surgeon: Ezequiel Elizalde MD;  Location: [facility];  Service:          FAMILY HISTORY  No family history on file.      SOCIAL HISTORY  Social History     Socioeconomic History    Marital status:    Tobacco Use    Smoking status: Former     Current packs/day: 0.00     Average packs/day: 0.5 packs/day for 35.0 years (17.5 ttl pk-yrs)     Types: Cigarettes     Start date: 1960     Quit date: 1995     Years since quittin.3   Substance and Sexual Activity    Alcohol use: Yes     Alcohol/week: 14.0 standard drinks of alcohol     Types: 14 Shots of liquor per week     Comment: TWO DAILY    Drug use: No    Sexual activity: Defer         ALLERGIES  Cefaclor, Penicillins, Sulfa antibiotics, and Nickel      REVIEW OF SYSTEMS  Review of Systems  Included in HPI  All systems reviewed and negative except for those discussed in HPI.      PHYSICAL EXAM    I have reviewed the triage vital signs and nursing notes.    ED Triage Vitals   Temp Heart Rate Resp BP SpO2   25   97.7 °F (36.5 °C) 56 22 (!) 166/102 93 %      Temp src Heart Rate Source Patient Position BP Location FiO2 (%)   25 -- -- -- --   Oral           Physical Exam  GENERAL: alert, no acute distress  SKIN: Warm, dry, no rashes  HENT: Normocephalic, atraumatic  EYES: no scleral icterus, normal  conjunctivae  CV: regular rhythm, regular rate, normal pulses  RESPIRATORY: normal effort, lungs clear bilaterally  ABDOMEN: soft, nondistended, nontender  MUSCULOSKELETAL: no deformity.  Mild swelling and tenderness of the right knee.  No erythema or induration.  NEURO: alert, moves all extremities, follows commands, impaired cognition/insight      LAB RESULTS  Recent Results (from the past 24 hours)   Comprehensive Metabolic Panel    Collection Time: 06/17/25  9:25 PM    Specimen: Blood   Result Value Ref Range    Glucose 78 65 - 99 mg/dL    BUN 39.0 (H) 8.0 - 23.0 mg/dL    Creatinine 1.47 (H) 0.57 - 1.00 mg/dL    Sodium 126 (L) 136 - 145 mmol/L    Potassium 3.5 3.5 - 5.2 mmol/L    Chloride 93 (L) 98 - 107 mmol/L    CO2 17.6 (L) 22.0 - 29.0 mmol/L    Calcium 8.4 (L) 8.6 - 10.5 mg/dL    Total Protein 6.8 6.0 - 8.5 g/dL    Albumin 3.5 3.5 - 5.2 g/dL    ALT (SGPT) 10 1 - 33 U/L    AST (SGOT) 14 1 - 32 U/L    Alkaline Phosphatase 145 (H) 39 - 117 U/L    Total Bilirubin 0.4 0.0 - 1.2 mg/dL    Globulin 3.3 gm/dL    A/G Ratio 1.1 g/dL    BUN/Creatinine Ratio 26.5 (H) 7.0 - 25.0    Anion Gap 15.4 (H) 5.0 - 15.0 mmol/L    eGFR 34.0 (L) >60.0 mL/min/1.73   CBC Auto Differential    Collection Time: 06/17/25  9:25 PM    Specimen: Blood   Result Value Ref Range    WBC 6.91 3.40 - 10.80 10*3/mm3    RBC 3.45 (L) 3.77 - 5.28 10*6/mm3    Hemoglobin 9.6 (L) 12.0 - 15.9 g/dL    Hematocrit 30.0 (L) 34.0 - 46.6 %    MCV 87.0 79.0 - 97.0 fL    MCH 27.8 26.6 - 33.0 pg    MCHC 32.0 31.5 - 35.7 g/dL    RDW 14.3 12.3 - 15.4 %    RDW-SD 45.6 37.0 - 54.0 fl    MPV 9.3 6.0 - 12.0 fL    Platelets 270 140 - 450 10*3/mm3    Neutrophil % 71.1 42.7 - 76.0 %    Lymphocyte % 11.3 (L) 19.6 - 45.3 %    Monocyte % 12.4 (H) 5.0 - 12.0 %    Eosinophil % 3.8 0.3 - 6.2 %    Basophil % 0.7 0.0 - 1.5 %    Immature Grans % 0.7 (H) 0.0 - 0.5 %    Neutrophils, Absolute 4.91 1.70 - 7.00 10*3/mm3    Lymphocytes, Absolute 0.78 0.70 - 3.10 10*3/mm3    Monocytes,  Absolute 0.86 0.10 - 0.90 10*3/mm3    Eosinophils, Absolute 0.26 0.00 - 0.40 10*3/mm3    Basophils, Absolute 0.05 0.00 - 0.20 10*3/mm3    Immature Grans, Absolute 0.05 0.00 - 0.05 10*3/mm3    nRBC 0.0 0.0 - 0.2 /100 WBC   Magnesium    Collection Time: 06/17/25  9:25 PM    Specimen: Blood   Result Value Ref Range    Magnesium 1.0 (L) 1.6 - 2.4 mg/dL   Urinalysis With Culture If Indicated - Urine, Catheter    Collection Time: 06/17/25 10:01 PM    Specimen: Urine, Catheter   Result Value Ref Range    Color, UA Yellow Yellow, Straw    Appearance, UA Clear Clear    pH, UA 5.5 5.0 - 8.0    Specific Gravity, UA 1.007 1.005 - 1.030    Glucose, UA Negative Negative    Ketones, UA Negative Negative    Bilirubin, UA Negative Negative    Blood, UA Negative Negative    Protein, UA Negative Negative    Leuk Esterase, UA Negative Negative    Nitrite, UA Positive (A) Negative    Urobilinogen, UA 0.2 E.U./dL 0.2 - 1.0 E.U./dL   Urinalysis, Microscopic Only - Urine, Catheter    Collection Time: 06/17/25 10:01 PM    Specimen: Urine, Catheter   Result Value Ref Range    RBC, UA 0-2 None Seen, 0-2 /HPF    WBC, UA 3-5 (A) None Seen, 0-2 /HPF    Bacteria, UA 4+ (A) None Seen /HPF    Squamous Epithelial Cells, UA 0-2 None Seen, 0-2 /HPF    Hyaline Casts, UA 0-2 None Seen /LPF    Methodology Automated Microscopy          RADIOLOGY  No Radiology Exams Resulted Within Past 24 Hours      MEDICATIONS GIVEN IN ER  Medications   sodium chloride 0.9 % flush 10 mL (has no administration in time range)   Magnesium Standard Dose Replacement - Follow Nurse / BPA Driven Protocol (has no administration in time range)   acetaminophen (TYLENOL) tablet 650 mg (has no administration in time range)   sennosides-docusate (PERICOLACE) 8.6-50 MG per tablet 2 tablet (has no administration in time range)     And   polyethylene glycol (MIRALAX) packet 17 g (has no administration in time range)     And   bisacodyl (DULCOLAX) EC tablet 5 mg (has no administration in  time range)     And   bisacodyl (DULCOLAX) suppository 10 mg (has no administration in time range)   ondansetron ODT (ZOFRAN-ODT) disintegrating tablet 4 mg (has no administration in time range)     Or   ondansetron (ZOFRAN) injection 4 mg (has no administration in time range)   sodium chloride 0.9 % infusion (has no administration in time range)   sodium chloride 0.9 % bolus 250 mL (250 mL Intravenous New Bag 6/17/25 5631)   magnesium sulfate 4g/100mL (PREMIX) infusion (4 g Intravenous New Bag 6/17/25 9308)   nitrofurantoin (macrocrystal-monohydrate) (MACROBID) capsule 100 mg (100 mg Oral Given 6/17/25 8527)         ORDERS PLACED DURING THIS VISIT:  Orders Placed This Encounter   Procedures    Comprehensive Metabolic Panel    Urinalysis With Culture If Indicated - Urine, Catheter    CBC Auto Differential    Magnesium    Urinalysis, Microscopic Only - Urine, Clean Catch    Basic Metabolic Panel    CBC (No Diff)    Magnesium    Diet: Cardiac; Healthy Heart (2-3 Na+); Fluid Consistency: Thin (IDDSI 0)    Bladder scan    Vital Signs    Intake & Output    Oral Care    Place Sequential Compression Device    Maintain Sequential Compression Device    Continuous Pulse Oximetry    Up With Assistance    Neuro Checks    Assess Patient For Urinary Retention    Utilize Voiding Measures if Retention is Suspected    Bladder Scan for Suspected Urinary Retention    Monitor Every 1-2 Hours for Spontaneous Void if Bladder Scan Volume is Less Than 500mL & Patient is Without Symptoms of Bladder Discomfort / Distention    Straight Cath For Acute Retention if Bladder Scan Volume is Greater Than 500mL or Patient Has Symptoms of Bladder Discomfort / Distention (Unless Contraindicated)    Notify Provider Acute Urinary Retention    Code Status and Medical Interventions: CPR (Attempt to Resuscitate); Full    LHA (on-call MD unless specified) Details    Incentive Spirometry    Oxygen Therapy- Nasal Cannula; Titrate 1-6 LPM Per SpO2; 90 - 95%     Insert Peripheral IV    Initiate Observation Status    CBC & Differential         OUTPATIENT MEDICATION MANAGEMENT:  Current Facility-Administered Medications Ordered in Epic   Medication Dose Route Frequency Provider Last Rate Last Admin    acetaminophen (TYLENOL) tablet 650 mg  650 mg Oral Q4H PRN Laura Pastrana APRN        sennosides-docusate (PERICOLACE) 8.6-50 MG per tablet 2 tablet  2 tablet Oral BID PRN Laura Pastrana APRN        And    polyethylene glycol (MIRALAX) packet 17 g  17 g Oral Daily PRN Laura Pastrana APRN        And    bisacodyl (DULCOLAX) EC tablet 5 mg  5 mg Oral Daily PRN Laura Pastrana APRN        And    bisacodyl (DULCOLAX) suppository 10 mg  10 mg Rectal Daily PRN Laura Pastrana APRN        Magnesium Standard Dose Replacement - Follow Nurse / BPA Driven Protocol   Not Applicable PRN Wander Johnson MD        magnesium sulfate 4g/100mL (PREMIX) infusion  4 g Intravenous Once John Mobley MD   4 g at 06/17/25 2599    ondansetron ODT (ZOFRAN-ODT) disintegrating tablet 4 mg  4 mg Oral Q6H PRN Laura Pastrana APRN        Or    ondansetron (ZOFRAN) injection 4 mg  4 mg Intravenous Q6H PRN Luara Pastrana APRN        sodium chloride 0.9 % bolus 250 mL  250 mL Intravenous Once Laura Pastrana APRN 250 mL/hr at 06/17/25 2302 250 mL at 06/17/25 2302    sodium chloride 0.9 % flush 10 mL  10 mL Intravenous PRN Wander Johnson MD        sodium chloride 0.9 % infusion  100 mL/hr Intravenous Continuous Laura Pastrana APRN         Current Outpatient Medications Ordered in Epic   Medication Sig Dispense Refill    albuterol (PROVENTIL HFA;VENTOLIN HFA) 108 (90 BASE) MCG/ACT inhaler Inhale 2 puffs Every 4 (Four) Hours As Needed for Wheezing. 6.7 g 2    amLODIPine-valsartan (EXFORGE) 5-160 MG per tablet Take 1 tablet by mouth Daily.      amoxicillin-clavulanate (AUGMENTIN) 875-125 MG per tablet Take 1 tablet by mouth 2 (Two) Times a Day. 14 tablet 0    calcium  carbonate (OS-RYAN) 600 MG tablet Take 600 mg by mouth 2 (Two) Times a Day.      celecoxib (CeleBREX) 200 MG capsule Take 200 mg by mouth Daily. HOLD PER MD MATAMOROS , LAST DOSE 3/29/17      Ferrous Sulfate (IRON) 325 (65 FE) MG tablet Take 1 tablet by mouth Daily.      hydrochlorothiazide (HYDRODIURIL) 12.5 MG tablet Take 12.5 mg by mouth Daily.      levothyroxine (SYNTHROID, LEVOTHROID) 125 MCG tablet Take 125 mcg by mouth Daily.      Multiple Vitamins-Minerals (ONE-A-DAY 50 PLUS PO) Take 1 tablet by mouth Daily. STOPPED PREOP, LAST DOSE 3/29/17      omeprazole (priLOSEC) 20 MG capsule Take 20 mg by mouth Daily.      oxyCODONE-acetaminophen (PERCOCET) 5-325 MG per tablet Take 2 tablets by mouth Every 4 (Four) Hours As Needed for Moderate Pain (4-6). 40 tablet 0    sertraline (ZOLOFT) 100 MG tablet Take 100 mg by mouth Daily.      simvastatin (ZOCOR) 20 MG tablet Take 20 mg by mouth Every Night.      zolpidem (AMBIEN) 10 MG tablet Take 5 mg by mouth Every Night.           PROCEDURES  Procedures      Critical care provider statement:    Critical care time (minutes): 31.   Critical care time was exclusive of:  Separately billable procedures and treating other patients   Critical care was necessary to treat or prevent imminent or life-threatening deterioration of the following conditions:  CNS Failure and Metabolic Failure   Critical care was time spent personally by me on the following activities:  Development of treatment plan with patient or surrogate, discussions with consultants, evaluation of patient's response to treatment, examination of patient, obtaining history from patient or surrogate, ordering and performing treatments and interventions, ordering and review of laboratory studies, ordering and review of radiographic studies, pulse oximetry, re-evaluation of patient's condition and review of old charts. Critical Care indicators: Altered Mental Status, Elderly, Delirium       PROGRESS, DATA ANALYSIS, CONSULTS,  AND MEDICAL DECISION MAKING  All labs have been independently interpreted by me.  All radiology studies have been reviewed by me. All EKG's have been independently viewed and interpreted by me.  Discussion below represents my analysis of pertinent findings related to patient's condition, differential diagnosis, treatment plan and final disposition.    Differential diagnosis includes but is not limited to urinary tract infection, sepsis, pyelonephritis, metabolic encephalopathy, hyponatremia, electrolyte abnormality, dehydration.    Clinical Scores:                   ED Course as of 06/17/25 2322   Tue Jun 17, 2025 2159 Hemoglobin(!): 9.6 [TINO]   2159 Hematocrit(!): 30.0 [TINO]   2231 Bacteria, UA(!): 4+ [TINO]   2231 Nitrite, UA(!): Positive  Starting 1 dose of Macrobid for treatment of UTI.  She has allergies listed for cephalosporins and penicillins and sulfa antibiotics.  So we will stick with oral Macrobid for now.  Prior urine culture on record showed E. coli that was sensitive to Macrobid. [TINO]   2233 Potassium: 3.5 [TINO]   2233 Sodium(!): 126  Planning to admit to the hospitalist for continued management of hyponatremia [TINO]   2237 I discussed with ALVAREZ Knott, from [facility] about this patient.  She agrees to admit her to the hospitalist service on behalf of Dr. Mobley for further care needs tonight. [TINO]      ED Course User Index  [TINO] Wander Johnson MD             AS OF 23:22 EDT VITALS:    BP - 133/61  HR - 58  TEMP - 97.7 °F (36.5 °C) (Oral)  O2 SATS - 97%    COMPLEXITY OF CARE  The patient requires admission.      DIAGNOSIS  Final diagnoses:   Urinary tract infection without hematuria, site unspecified   Hyponatremia   PHUONG (acute kidney injury)   Altered mental status, unspecified altered mental status type         DISPOSITION  ED Disposition       ED Disposition   Decision to Admit    Condition   --    Comment   Level of Care: Med/Surg [1]   Diagnosis: Hyponatremia [198519]   Admitting Physician:  CINDI RASCON [[id number]]   Attending Physician: CINDI RASCON [[id number]]   Is patient appropriate for Inpatient Observation Unit?: No [0]                  Please note that portions of this document were completed with a voice recognition program.    Note Disclaimer: At [facility], we believe that sharing information builds trust and better relationships. You are receiving this note because you recently visited [facility]. It is possible you will see health information before a provider has talked with you about it. This kind of information can be easy to misunderstand. To help you fully understand what it means for your health, we urge you to discuss this note with your provider.         Wander Johnson MD  06/17/25 8814

## 2025-06-18 NOTE — CONSULTS
Patient Care Team:  Provider, No Known as PCP - General    Chief complaint: PHUONG, hyponatremia     Inpatient Nephrology Consult  Consult performed by: Kristy Escoto MD  Consult ordered by: Main Taveras MD             History of Present Illness  Patient is a 88 yo female with no know renal disease who presented to ED with urinary frequency and abnormal labs. She had gotten blood work done yesterday, revealing a na of 124 and Cr 1.4. it was recommended she come to ED.   On evaluation, she was found to have UTI and Na of 126  She has been started on IVF. Na this am is 125, Cr is improving.   History is obtained by chart review as she does not give reliable information and is more focus on wanting to go home and upset she is here. She is also very hard of hearing.     Home meds include celecoxib, HCTZ and lasix as well as amlodipine-valsartan and irbesartan. Not sure how accurate this list is and how adherent she is.     Review of Systems   Limited, but she denies sob, chest pain, diarrhea or vomiting prior to arrival.       Past Medical History:   Diagnosis Date    Anemia     Anxiety     Arthritis     Cancer 2006    BREAST CANCER, RIGHT MASTECTOMY    Chronic bronchitis     Depression     Disease of thyroid gland     GERD (gastroesophageal reflux disease)     Hard of hearing     Heart murmur     History of kidney stones     History of UTI 02/2017    HAD ABX    Hyperlipidemia     Hypertension     MRSA (methicillin resistant Staphylococcus aureus) infection 2000s    EARLY 2000's, HERE AT [facility], LEFT KNEE  , D/W GISELLE IN INFECTION CONTROL    RBBB     Risk factors for obstructive sleep apnea     Shoulder pain     LEFT   ,   Past Surgical History:   Procedure Laterality Date    APPENDECTOMY      CATARACT EXTRACTION      WITH LENS IMPLANT    CHOLECYSTECTOMY      CYST REMOVAL      ON SPINE    EYE SURGERY      HYSTERECTOMY      JOINT REPLACEMENT      LT KNEE    MASTECTOMY Right 2006    MASTECTOMY RADICAL WITH AXILLARY  NODE DISSECTION Right     THYROIDECTOMY      TOTAL SHOULDER ARTHROPLASTY W/ DISTAL CLAVICLE EXCISION Left 2017    Procedure: LT TOTAL SHOULDER REVERSE ARTHROPLASTY;  Surgeon: Ezequiel Elizalde MD;  Location: [facility] OR [facility];  Service:    , History reviewed. No pertinent family history.,   Social History     Socioeconomic History    Marital status:    Tobacco Use    Smoking status: Former     Current packs/day: 0.00     Average packs/day: 0.5 packs/day for 35.0 years (17.5 ttl pk-yrs)     Types: Cigarettes     Start date: 1960     Quit date: 1995     Years since quittin.3   Vaping Use    Vaping status: Never Used   Substance and Sexual Activity    Alcohol use: Yes     Alcohol/week: 14.0 standard drinks of alcohol     Types: 14 Shots of liquor per week     Comment: 4-5 X per week , 2-4 shots    Drug use: No    Sexual activity: Defer     E-cigarette/Vaping    E-cigarette/Vaping Use Never User      E-cigarette/Vaping Substances     E-cigarette/Vaping Devices         ,   Medications Prior to Admission   Medication Sig Dispense Refill Last Dose/Taking    albuterol (PROVENTIL HFA;VENTOLIN HFA) 108 (90 BASE) MCG/ACT inhaler Inhale 2 puffs Every 4 (Four) Hours As Needed for Wheezing. 6.7 g 2 Taking As Needed    amLODIPine (NORVASC) 2.5 MG tablet Take 1 tablet by mouth 2 (Two) Times a Day.   Taking    bisoprolol (ZEBeta) 5 MG tablet Take 1 tablet by mouth Daily.   Taking    Ferrous Sulfate (IRON) 325 (65 FE) MG tablet Take 1 tablet by mouth Daily.   Taking    Fluticasone Furoate-Vilanterol (Breo Ellipta) 200-25 MCG/ACT inhaler Inhale 1 puff Every Night.   Taking    furosemide (LASIX) 40 MG tablet Take 1 tablet by mouth Daily.   Taking    hydrochlorothiazide (HYDRODIURIL) 12.5 MG tablet Take 1 tablet by mouth Daily.   Taking    irbesartan (AVAPRO) 300 MG tablet Take 1 tablet by mouth Daily.   Taking    levothyroxine (SYNTHROID, LEVOTHROID) 125 MCG tablet Take 1 tablet by mouth Daily.   Taking    omeprazole  (priLOSEC) 20 MG capsule Take 1 capsule by mouth Daily.   Taking    potassium chloride 10 MEQ CR tablet Take 1 tablet by mouth 2 (Two) Times a Day.   Taking    sertraline (ZOLOFT) 100 MG tablet Take 1 tablet by mouth Daily.   Taking    simvastatin (ZOCOR) 20 MG tablet Take 1 tablet by mouth Every Night.   Taking    traMADol (ULTRAM) 50 MG tablet Take 1 tablet by mouth Every 12 (Twelve) Hours As Needed for Moderate Pain.   Taking As Needed    amLODIPine-valsartan (EXFORGE) 5-160 MG per tablet Take 1 tablet by mouth Daily.       amoxicillin-clavulanate (AUGMENTIN) 875-125 MG per tablet Take 1 tablet by mouth 2 (Two) Times a Day. 14 tablet 0     calcium carbonate (OS-RYAN) 600 MG tablet Take 1 tablet by mouth 2 (Two) Times a Day.       celecoxib (CeleBREX) 200 MG capsule Take 1 capsule by mouth Daily. HOLD PER MD INSTR , LAST DOSE 3/29/17       Multiple Vitamins-Minerals (ONE-A-DAY 50 PLUS PO) Take 1 tablet by mouth Daily. STOPPED PREOP, LAST DOSE 3/29/17       oxyCODONE-acetaminophen (PERCOCET) 5-325 MG per tablet Take 2 tablets by mouth Every 4 (Four) Hours As Needed for Moderate Pain (4-6). 40 tablet 0     zolpidem (AMBIEN) 10 MG tablet Take 0.5 tablets by mouth Every Night.      , Scheduled Meds:  amLODIPine, 2.5 mg, Oral, BID  atorvastatin, 10 mg, Oral, Daily  bisoprolol, 5 mg, Oral, Daily  budesonide-formoterol, 2 puff, Inhalation, BID - RT  ferrous sulfate, 325 mg, Oral, Daily  [Held by provider] furosemide, 40 mg, Oral, Daily  [Held by provider] hydroCHLOROthiazide, 12.5 mg, Oral, Daily  levothyroxine, 125 mcg, Oral, Daily  pantoprazole, 40 mg, Oral, Q AM  sertraline, 100 mg, Oral, Daily   , Continuous Infusions:  sodium chloride, 100 mL/hr, Last Rate: 100 mL/hr (06/18/25 0857)   , PRN Meds:    acetaminophen    albuterol    senna-docusate sodium **AND** polyethylene glycol **AND** bisacodyl **AND** bisacodyl    HYDROcodone-acetaminophen    Magnesium Standard Dose Replacement - Follow Nurse / BPA Driven  "Protocol    ondansetron ODT **OR** ondansetron    [COMPLETED] Insert Peripheral IV **AND** sodium chloride, and Allergies:  Cefaclor, Penicillins, Sulfa antibiotics, and Nickel    Objective     Vital Signs  Temp:  [96.7 °F (35.9 °C)-97.7 °F (36.5 °C)] 96.9 °F (36.1 °C)  Heart Rate:  [56-85] 58  Resp:  [16-22] 20  BP: (107-166)/() 134/66    I/O this shift:  In: 240 [P.O.:240]  Out: 300 [Urine:300]  I/O last 3 completed shifts:  In: 540 [P.O.:540]  Out: 400 [Urine:400]    Physical Exam  Physical Examination: General appearance - alert, well appearing, and in no distress  Chest - clear to auscultation, no wheezes, rales or rhonchi, symmetric air entry  Heart - normal rate, regular rhythm, normal S1, S2, no murmurs, rubs, clicks or gallops  Abdomen - soft, nontender, nondistended, no masses or organomegaly  Neurological - alert, oriented, normal speech, no focal findings or movement disorder noted  Extremities - peripheral pulses normal, no pedal edema, no clubbing or cyanosis       Results Review:    I reviewed the patient's new clinical results.    WBC WBC   Date Value Ref Range Status   06/18/2025 7.59 3.40 - 10.80 10*3/mm3 Final   06/17/2025 6.91 3.40 - 10.80 10*3/mm3 Final      HGB Hemoglobin   Date Value Ref Range Status   06/18/2025 8.9 (L) 12.0 - 15.9 g/dL Final   06/17/2025 9.6 (L) 12.0 - 15.9 g/dL Final      HCT Hematocrit   Date Value Ref Range Status   06/18/2025 28.7 (L) 34.0 - 46.6 % Final   06/17/2025 30.0 (L) 34.0 - 46.6 % Final      Platlets No results found for: \"LABPLAT\"   MCV MCV   Date Value Ref Range Status   06/18/2025 87.0 79.0 - 97.0 fL Final   06/17/2025 87.0 79.0 - 97.0 fL Final          Sodium Sodium   Date Value Ref Range Status   06/18/2025 125 (L) 136 - 145 mmol/L Final   06/17/2025 126 (L) 136 - 145 mmol/L Final      Potassium Potassium   Date Value Ref Range Status   06/18/2025 3.0 (L) 3.5 - 5.2 mmol/L Final   06/17/2025 3.5 3.5 - 5.2 mmol/L Final      Chloride Chloride   Date " "Value Ref Range Status   06/18/2025 92 (L) 98 - 107 mmol/L Final   06/17/2025 93 (L) 98 - 107 mmol/L Final      CO2 CO2   Date Value Ref Range Status   06/18/2025 18.6 (L) 22.0 - 29.0 mmol/L Final   06/17/2025 17.6 (L) 22.0 - 29.0 mmol/L Final      BUN BUN   Date Value Ref Range Status   06/18/2025 38.0 (H) 8.0 - 23.0 mg/dL Final   06/17/2025 39.0 (H) 8.0 - 23.0 mg/dL Final      Creatinine Creatinine   Date Value Ref Range Status   06/18/2025 1.25 (H) 0.57 - 1.00 mg/dL Final   06/17/2025 1.47 (H) 0.57 - 1.00 mg/dL Final      Calcium Calcium   Date Value Ref Range Status   06/18/2025 8.3 (L) 8.6 - 10.5 mg/dL Final   06/17/2025 8.4 (L) 8.6 - 10.5 mg/dL Final      PO4 No results found for: \"CAPO4\"   Albumin Albumin   Date Value Ref Range Status   06/17/2025 3.5 3.5 - 5.2 g/dL Final      Magnesium Magnesium   Date Value Ref Range Status   06/18/2025 2.3 1.6 - 2.4 mg/dL Final   06/17/2025 1.0 (L) 1.6 - 2.4 mg/dL Final      Uric Acid Uric Acid   Date Value Ref Range Status   06/18/2025 8.9 (H) 2.4 - 5.7 mg/dL Final            Assessment & Plan       PHUONG (acute kidney injury)    Acute UTI (urinary tract infection)    Hyponatremia    Hypomagnesemia    Hypertension    Anemia    COPD (chronic obstructive pulmonary disease)    Alcohol use      Assessment & Plan  PHUONG  Hyponatremia   Metabolic acidosis   UTI   HTN   EtOH abdulaziz   Hypomagnesemia   8. COPD.     PHUONG might be related to volume depletion/UTI/medications.   Hyponatremia likely related to PHUONG/volume depletion/HCTZ. SIADH cannot be diagnosed while she has PHUONG.   Agree with IVF, but will change to LR.   Hold ARB (2 are listed on her home meds)  Hold lasix and HZTC  Check labs in am   Encourage nutrition.   Continue abx.   Will follow   Thanks for referral   I discussed the patients findings and my recommendations with patient and primary care team    Kristy Escoto MD  06/18/25  10:11 EDT          "

## 2025-06-18 NOTE — ED NOTES
"Nursing report ED to floor  Candice Walker  89 y.o.  female    Candice Walker is a 89 y.o. female with a medical history of arthritis, anemia, hypertension, hyperlipidemia and anxiety who presents to the ED c/o acute abnormal urinary patterns as well as head discomfort and \"pain from my head to my toes.\"  Patient reportedly had outpatient labs performed this week which showed low sodium levels.  Therefore she was transported from her facility to our location for further evaluation.  She denies abdominal pain.  Denies chest pain or difficulties breathing.  Denies nausea or vomiting.       HPI :  HPI  Stated Reason for Visit: pt to ed via ems from home for c/o urinary retention and urinary frequency. pt reports pain \"from my head to my toes.\"  Per EMS pt has experienced retention since yesterday. pt reports one episode of urination since yesterday    Chief Complaint  Chief Complaint   Patient presents with    Urinary Frequency    Urinary Retention    Abnormal Lab       Admitting doctor:   John Mobley MD    Admitting diagnosis:   The primary encounter diagnosis was Urinary tract infection without hematuria, site unspecified. Diagnoses of Hyponatremia, PHUONG (acute kidney injury), and Altered mental status, unspecified altered mental status type were also pertinent to this visit.    Code status:   Current Code Status       Date Active Code Status Order ID Comments User Context       6/17/2025 2242 CPR (Attempt to Resuscitate) [id number]  Laura Pastrana, [facility]        Question Answer    Code Status (Patient has no pulse and is not breathing) CPR (Attempt to Resuscitate)    Medical Interventions (Patient has pulse or is breathing) Full                    Allergies:   Cefaclor, Penicillins, Sulfa antibiotics, and Nickel    Isolation:   No active isolations    Intake and Output  No intake or output data in the 24 hours ending 06/17/25 2304    Weight:       06/17/25 2243   Weight: 63.6 kg (140 lb 3.4 oz)       Most " recent vitals:   Vitals:    06/17/25 2106 06/17/25 2121 06/17/25 2243 06/17/25 2300   BP:  136/78  133/61   Pulse:    58   Resp:    20   Temp: 97.7 °F (36.5 °C)      TempSrc: Oral      SpO2:    97%   Weight:   63.6 kg (140 lb 3.4 oz)    Height:           Active LDAs/IV Access:   Lines, Drains & Airways       Active LDAs       Name Placement date Placement time Site Days    Peripheral IV 06/17/25 2119 20 G Left Antecubital 06/17/25 2119  Antecubital  less than 1                    Labs (abnormal labs have a star):   Labs Reviewed   COMPREHENSIVE METABOLIC PANEL - Abnormal; Notable for the following components:       Result Value    BUN 39.0 (*)     Creatinine 1.47 (*)     Sodium 126 (*)     Chloride 93 (*)     CO2 17.6 (*)     Calcium 8.4 (*)     Alkaline Phosphatase 145 (*)     BUN/Creatinine Ratio 26.5 (*)     Anion Gap 15.4 (*)     eGFR 34.0 (*)     All other components within normal limits    Narrative:     GFR Categories in Chronic Kidney Disease (CKD)              GFR Category          GFR (mL/min/1.73)    Interpretation  G1                    90 or greater        Normal or high (1)  G2                    60-89                Mild decrease (1)  G3a                   45-59                Mild to moderate decrease  G3b                   30-44                Moderate to severe decrease  G4                    15-29                Severe decrease  G5                    14 or less           Kidney failure    (1)In the absence of evidence of kidney disease, neither GFR category G1 or G2 fulfill the criteria for CKD.    eGFR calculation 2021 CKD-EPI creatinine equation, which does not include race as a factor   URINALYSIS W/ CULTURE IF INDICATED - Abnormal; Notable for the following components:    Nitrite, UA Positive (*)     All other components within normal limits    Narrative:     In absence of clinical symptoms, the presence of pyuria, bacteria, and/or nitrites on the urinalysis result does not correlate with  infection.   CBC WITH AUTO DIFFERENTIAL - Abnormal; Notable for the following components:    RBC 3.45 (*)     Hemoglobin 9.6 (*)     Hematocrit 30.0 (*)     Lymphocyte % 11.3 (*)     Monocyte % 12.4 (*)     Immature Grans % 0.7 (*)     All other components within normal limits   MAGNESIUM - Abnormal; Notable for the following components:    Magnesium 1.0 (*)     All other components within normal limits   URINALYSIS, MICROSCOPIC ONLY - Abnormal; Notable for the following components:    WBC, UA 3-5 (*)     Bacteria, UA 4+ (*)     All other components within normal limits   BASIC METABOLIC PANEL   CBC (NO DIFF)   MAGNESIUM   CBC AND DIFFERENTIAL    Narrative:     The following orders were created for panel order CBC & Differential.  Procedure                               Abnormality         Status                     ---------                               -----------         ------                     CBC Auto Differential[[id number]]        Abnormal            Final result                 Please view results for these tests on the individual orders.       EKG:   No orders to display       Meds given in ED:   Medications   sodium chloride 0.9 % flush 10 mL (has no administration in time range)   Magnesium Standard Dose Replacement - Follow Nurse / BPA Driven Protocol (has no administration in time range)   acetaminophen (TYLENOL) tablet 650 mg (has no administration in time range)   sennosides-docusate (PERICOLACE) 8.6-50 MG per tablet 2 tablet (has no administration in time range)     And   polyethylene glycol (MIRALAX) packet 17 g (has no administration in time range)     And   bisacodyl (DULCOLAX) EC tablet 5 mg (has no administration in time range)     And   bisacodyl (DULCOLAX) suppository 10 mg (has no administration in time range)   ondansetron ODT (ZOFRAN-ODT) disintegrating tablet 4 mg (has no administration in time range)     Or   ondansetron (ZOFRAN) injection 4 mg (has no administration in time range)  "  sodium chloride 0.9 % infusion (has no administration in time range)   sodium chloride 0.9 % bolus 250 mL (250 mL Intravenous New Bag 25 3695)   magnesium sulfate 4g/100mL (PREMIX) infusion (4 g Intravenous New Bag 25 1199)   nitrofurantoin (macrocrystal-monohydrate) (MACROBID) capsule 100 mg (100 mg Oral Given 25 667)       Imaging results:  No radiology results for the last day    Ambulatory status:   - assist with walker    Social issues:   Social History     Socioeconomic History    Marital status:    Tobacco Use    Smoking status: Former     Current packs/day: 0.00     Average packs/day: 0.5 packs/day for 35.0 years (17.5 ttl pk-yrs)     Types: Cigarettes     Start date: 1960     Quit date: 1995     Years since quittin.3   Substance and Sexual Activity    Alcohol use: Yes     Alcohol/week: 14.0 standard drinks of alcohol     Types: 14 Shots of liquor per week     Comment: TWO DAILY    Drug use: No    Sexual activity: Defer       Peripheral Neurovascular  Peripheral Neurovascular (Adult)  Peripheral Neurovascular WDL: WDL    Neuro Cognitive  Neuro Cognitive (Adult)  Cognitive/Neuro/Behavioral WDL: .WDL except, orientation  Orientation: disoriented to, time    Learning  Learning Assessment  Learning Readiness and Ability: no barriers identified  Education Provided  Person Taught: patient  Teaching Method: verbal instruction  Teaching Focus: symptom/problem overview, diagnostic test  Education Outcome Evaluation: verbalizes understanding    Respiratory  Respiratory WDL  Respiratory WDL: WDL    Abdominal Pain       Pain Assessments  Pain (Adult)  (0-10) Pain Rating: Rest: 9  (0-10) Pain Rating: Activity: 10  Pain Location:  (\"starts at my head and goes to my feet\")  Pain Side/Orientation: generalized    NIH Stroke Scale       Pauly Rincon RN  25 23:04 EDT   "

## 2025-06-19 LAB
ANION GAP SERPL CALCULATED.3IONS-SCNC: 11.8 MMOL/L (ref 5–15)
BASOPHILS # BLD AUTO: 0.02 10*3/MM3 (ref 0–0.2)
BASOPHILS NFR BLD AUTO: 0.4 % (ref 0–1.5)
BUN SERPL-MCNC: 27 MG/DL (ref 8–23)
BUN/CREAT SERPL: 26.2 (ref 7–25)
CALCIUM SPEC-SCNC: 8.6 MG/DL (ref 8.6–10.5)
CHLORIDE SERPL-SCNC: 93 MMOL/L (ref 98–107)
CO2 SERPL-SCNC: 22.2 MMOL/L (ref 22–29)
CREAT SERPL-MCNC: 1.03 MG/DL (ref 0.57–1)
DEPRECATED RDW RBC AUTO: 45.5 FL (ref 37–54)
EGFRCR SERPLBLD CKD-EPI 2021: 52.1 ML/MIN/1.73
EOSINOPHIL # BLD AUTO: 0.15 10*3/MM3 (ref 0–0.4)
EOSINOPHIL NFR BLD AUTO: 2.7 % (ref 0.3–6.2)
ERYTHROCYTE [DISTWIDTH] IN BLOOD BY AUTOMATED COUNT: 14.5 % (ref 12.3–15.4)
GLUCOSE SERPL-MCNC: 95 MG/DL (ref 65–99)
HCT VFR BLD AUTO: 26.2 % (ref 34–46.6)
HGB BLD-MCNC: 8.4 G/DL (ref 12–15.9)
IMM GRANULOCYTES # BLD AUTO: 0.02 10*3/MM3 (ref 0–0.05)
IMM GRANULOCYTES NFR BLD AUTO: 0.4 % (ref 0–0.5)
LYMPHOCYTES # BLD AUTO: 0.39 10*3/MM3 (ref 0.7–3.1)
LYMPHOCYTES NFR BLD AUTO: 7.1 % (ref 19.6–45.3)
MCH RBC QN AUTO: 27.4 PG (ref 26.6–33)
MCHC RBC AUTO-ENTMCNC: 32.1 G/DL (ref 31.5–35.7)
MCV RBC AUTO: 85.3 FL (ref 79–97)
MONOCYTES # BLD AUTO: 0.61 10*3/MM3 (ref 0.1–0.9)
MONOCYTES NFR BLD AUTO: 11.2 % (ref 5–12)
NEUTROPHILS NFR BLD AUTO: 4.27 10*3/MM3 (ref 1.7–7)
NEUTROPHILS NFR BLD AUTO: 78.2 % (ref 42.7–76)
NRBC BLD AUTO-RTO: 0 /100 WBC (ref 0–0.2)
PLATELET # BLD AUTO: 221 10*3/MM3 (ref 140–450)
PMV BLD AUTO: 9.4 FL (ref 6–12)
POTASSIUM SERPL-SCNC: 2.7 MMOL/L (ref 3.5–5.2)
RBC # BLD AUTO: 3.07 10*6/MM3 (ref 3.77–5.28)
SODIUM SERPL-SCNC: 127 MMOL/L (ref 136–145)
WBC NRBC COR # BLD AUTO: 5.46 10*3/MM3 (ref 3.4–10.8)

## 2025-06-19 PROCEDURE — 80048 BASIC METABOLIC PNL TOTAL CA: CPT | Performed by: STUDENT IN AN ORGANIZED HEALTH CARE EDUCATION/TRAINING PROGRAM

## 2025-06-19 PROCEDURE — 97535 SELF CARE MNGMENT TRAINING: CPT

## 2025-06-19 PROCEDURE — 97530 THERAPEUTIC ACTIVITIES: CPT

## 2025-06-19 PROCEDURE — 85025 COMPLETE CBC W/AUTO DIFF WBC: CPT | Performed by: STUDENT IN AN ORGANIZED HEALTH CARE EDUCATION/TRAINING PROGRAM

## 2025-06-19 PROCEDURE — 25010000002 MAGNESIUM SULFATE 4 GM/100ML SOLUTION: Performed by: STUDENT IN AN ORGANIZED HEALTH CARE EDUCATION/TRAINING PROGRAM

## 2025-06-19 RX ORDER — MAGNESIUM SULFATE HEPTAHYDRATE 40 MG/ML
4 INJECTION, SOLUTION INTRAVENOUS ONCE
Status: COMPLETED | OUTPATIENT
Start: 2025-06-19 | End: 2025-06-19

## 2025-06-19 RX ORDER — NITROFURANTOIN 25; 75 MG/1; MG/1
100 CAPSULE ORAL EVERY 12 HOURS SCHEDULED
Status: DISPENSED | OUTPATIENT
Start: 2025-06-19 | End: 2025-06-24

## 2025-06-19 RX ORDER — POTASSIUM CHLORIDE 1500 MG/1
40 TABLET, EXTENDED RELEASE ORAL
Status: COMPLETED | OUTPATIENT
Start: 2025-06-19 | End: 2025-06-19

## 2025-06-19 RX ADMIN — MAGNESIUM SULFATE HEPTAHYDRATE 4 G: 4 INJECTION, SOLUTION INTRAVENOUS at 13:57

## 2025-06-19 RX ADMIN — SERTRALINE HYDROCHLORIDE 100 MG: 100 TABLET, FILM COATED ORAL at 10:00

## 2025-06-19 RX ADMIN — NITROFURANTOIN MONOHYDRATE/MACROCRYSTALLINE 100 MG: 25; 75 CAPSULE ORAL at 20:49

## 2025-06-19 RX ADMIN — LEVOTHYROXINE SODIUM 125 MCG: 125 TABLET ORAL at 09:59

## 2025-06-19 RX ADMIN — AMLODIPINE BESYLATE 2.5 MG: 5 TABLET ORAL at 09:57

## 2025-06-19 RX ADMIN — POTASSIUM CHLORIDE 40 MEQ: 1500 TABLET, EXTENDED RELEASE ORAL at 19:12

## 2025-06-19 RX ADMIN — BISOPROLOL FUMARATE 5 MG: 5 TABLET ORAL at 10:00

## 2025-06-19 RX ADMIN — ACETAMINOPHEN 650 MG: 325 TABLET, FILM COATED ORAL at 17:16

## 2025-06-19 RX ADMIN — PANTOPRAZOLE SODIUM 40 MG: 40 TABLET, DELAYED RELEASE ORAL at 04:48

## 2025-06-19 RX ADMIN — FERROUS SULFATE TAB 325 MG (65 MG ELEMENTAL FE) 325 MG: 325 (65 FE) TAB at 09:57

## 2025-06-19 RX ADMIN — ATORVASTATIN CALCIUM 10 MG: 20 TABLET, FILM COATED ORAL at 09:57

## 2025-06-19 RX ADMIN — POTASSIUM CHLORIDE 40 MEQ: 1500 TABLET, EXTENDED RELEASE ORAL at 23:54

## 2025-06-19 RX ADMIN — AMLODIPINE BESYLATE 2.5 MG: 5 TABLET ORAL at 20:34

## 2025-06-19 RX ADMIN — POTASSIUM CHLORIDE 40 MEQ: 1500 TABLET, EXTENDED RELEASE ORAL at 20:50

## 2025-06-19 NOTE — PROGRESS NOTES
"   LOS: 1 day     Chief Complaint/ Reason for encounter: Hyponatremia, PHUONG    Subjective   06/19/25 : Patient is doing  better today with no new complaints  Good appetite with no nausea or vomiting  No shortness of breath chest pain or edema  Voiding well with no dysuria        Medical history reviewed:  History of Present Illness    Subjective    History taken from: Chart and patient/family as able    Vital Signs  Temp:  [97.2 °F (36.2 °C)-98 °F (36.7 °C)] 97.2 °F (36.2 °C)  Heart Rate:  [55-72] 62  Resp:  [18] 18  BP: (130-154)/(62-80) 154/74       Wt Readings from Last 1 Encounters:   06/18/25 0038 64.2 kg (141 lb 8.6 oz)   06/17/25 2243 63.6 kg (140 lb 3.4 oz)       Objective:  Vital signs: (most recent): Blood pressure 154/74, pulse 62, temperature 97.2 °F (36.2 °C), temperature source Oral, resp. rate 18, height 157.5 cm (62.01\"), weight 64.2 kg (141 lb 8.6 oz), SpO2 94%.                Objective:  General Appearance:  Comfortable, chronically ill-appearing, no acute distress   HEENT: Mucous membranes moist, atraumatic  Lungs:  Normal effort and normal respiratory rate.  Breath sounds clear to auscultation: No rhonchi/Rales.  No  respiratory distress.   Heart:  S1, S2 normal.   Abdomen: Abdomen is soft, nontender/nondistended  Extremities: No significant edema of bilateral lower extremities  Skin:  Warm and dry       Results Review:    Intake/Output:     Intake/Output Summary (Last 24 hours) at 6/19/2025 1354  Last data filed at 6/19/2025 0659  Gross per 24 hour   Intake 1900 ml   Output 1001 ml   Net 899 ml         DATA:  Radiology and Labs:  The following labs independently reviewed by me. Additional labs ordered for tomorrow a.m.  Interval notes, chart personally reviewed by me.   Old records independently reviewed showing baseline creatinine around 1  The following radiologic studies independently viewed by me, findings recent CT angio with a IV protocol showed no PE, some cardiomegaly and pulmonary " edema  New problems include hypokalemia, hyponatremia  Discussed with patient himself at bedside    Risk/ complexity of medical care/ medical decision making moderate complexity, electrolyte management    Labs:   Recent Results (from the past 24 hours)   Basic Metabolic Panel    Collection Time: 06/19/25 12:10 PM    Specimen: Blood   Result Value Ref Range    Glucose 95 65 - 99 mg/dL    BUN 27.0 (H) 8.0 - 23.0 mg/dL    Creatinine 1.03 (H) 0.57 - 1.00 mg/dL    Sodium 127 (L) 136 - 145 mmol/L    Potassium 2.7 (L) 3.5 - 5.2 mmol/L    Chloride 93 (L) 98 - 107 mmol/L    CO2 22.2 22.0 - 29.0 mmol/L    Calcium 8.6 8.6 - 10.5 mg/dL    BUN/Creatinine Ratio 26.2 (H) 7.0 - 25.0    Anion Gap 11.8 5.0 - 15.0 mmol/L    eGFR 52.1 (L) >60.0 mL/min/1.73   CBC Auto Differential    Collection Time: 06/19/25 12:10 PM    Specimen: Blood   Result Value Ref Range    WBC 5.46 3.40 - 10.80 10*3/mm3    RBC 3.07 (L) 3.77 - 5.28 10*6/mm3    Hemoglobin 8.4 (L) 12.0 - 15.9 g/dL    Hematocrit 26.2 (L) 34.0 - 46.6 %    MCV 85.3 79.0 - 97.0 fL    MCH 27.4 26.6 - 33.0 pg    MCHC 32.1 31.5 - 35.7 g/dL    RDW 14.5 12.3 - 15.4 %    RDW-SD 45.5 37.0 - 54.0 fl    MPV 9.4 6.0 - 12.0 fL    Platelets 221 140 - 450 10*3/mm3    Neutrophil % 78.2 (H) 42.7 - 76.0 %    Lymphocyte % 7.1 (L) 19.6 - 45.3 %    Monocyte % 11.2 5.0 - 12.0 %    Eosinophil % 2.7 0.3 - 6.2 %    Basophil % 0.4 0.0 - 1.5 %    Immature Grans % 0.4 0.0 - 0.5 %    Neutrophils, Absolute 4.27 1.70 - 7.00 10*3/mm3    Lymphocytes, Absolute 0.39 (L) 0.70 - 3.10 10*3/mm3    Monocytes, Absolute 0.61 0.10 - 0.90 10*3/mm3    Eosinophils, Absolute 0.15 0.00 - 0.40 10*3/mm3    Basophils, Absolute 0.02 0.00 - 0.20 10*3/mm3    Immature Grans, Absolute 0.02 0.00 - 0.05 10*3/mm3    nRBC 0.0 0.0 - 0.2 /100 WBC       Radiology:  Pertinent radiology studies were reviewed as described above      Medications have been reviewed separately in chart review medication tab      ASSESSMENT:    Assessment & Plan  PHUONG,  improved and nearing baseline  Hyponatremia, steady increase, correcting acceptable rate  Metabolic acidosis, improved  UTI   HTN, near goal for age  EtOH abdulaziz   Hypomagnesemia, improved  8. COPD.      Plan:  Her renal function continues to improve, creatinine approaching baseline  Sodium still low but increasing and acceptable rate  Monitor off IV fluids at this time  Replace potassium per protocol  Magnesium calcium normal levels, check phosphorus in a.m.  Continue to hold HCTZ and ARB, Lasix.  Urine osmolality 270 but elevated uric acid is not consistent with SIADH    Encourage nutrition and oral hydration  Continue abx.  Dosed for GFR around 50  Follow-up a.m. labs      Leonides Victoria MD  [facility]   Office phone number: [phone]  Answering service phone number: [phone]    06/19/25  13:54 EDT    Dictation performed using Dragon dictation software

## 2025-06-19 NOTE — PROGRESS NOTES
"    Name: Candice Walker ADMIT: 2025   : 1935  PCP: Provider, No Known    MRN: [id number] LOS: 1 days   AGE/SEX: 89 y.o. female  ROOM: [facility]     Subjective   Subjective   No new issues today.    Objective   Objective   Vital Signs  Temp:  [97.2 °F (36.2 °C)-98 °F (36.7 °C)] 97.2 °F (36.2 °C)  Heart Rate:  [55-72] 62  Resp:  [18] 18  BP: (130-154)/(62-80) 154/74  SpO2:  [86 %-94 %] 94 %  on   ;   Device (Oxygen Therapy): room air  Body mass index is 25.88 kg/m².  Physical Exam  Constitutional:       General: She is not in acute distress.     Comments: Very hard of hearing   Pulmonary:      Effort: Pulmonary effort is normal. No respiratory distress.      Breath sounds: No stridor.   Skin:     Coloration: Skin is not jaundiced.      Findings: No bruising.   Neurological:      Mental Status: She is alert.         Results Review     I reviewed the patient's new clinical results.  Results from last 7 days   Lab Units 25  1210 25  0356 25  2125   WBC 10*3/mm3 5.46 7.59 6.91   HEMOGLOBIN g/dL 8.4* 8.9* 9.6*   PLATELETS 10*3/mm3 221 250 270     Results from last 7 days   Lab Units 25  1210 25  0356 25  2125   SODIUM mmol/L 127* 125* 126*   POTASSIUM mmol/L 2.7* 3.0* 3.5   CHLORIDE mmol/L 93* 92* 93*   CO2 mmol/L 22.2 18.6* 17.6*   BUN mg/dL 27.0* 38.0* 39.0*   CREATININE mg/dL 1.03* 1.25* 1.47*   GLUCOSE mg/dL 95 93 78   EGFR mL/min/1.73 52.1* 41.3* 34.0*     Results from last 7 days   Lab Units 25  2125   ALBUMIN g/dL 3.5   BILIRUBIN mg/dL 0.4   ALK PHOS U/L 145*   AST (SGOT) U/L 14   ALT (SGPT) U/L 10     Results from last 7 days   Lab Units 25  1210 25  0356 25  2125   CALCIUM mg/dL 8.6 8.3* 8.4*   ALBUMIN g/dL  --   --  3.5   MAGNESIUM mg/dL  --  2.3 1.0*       No results found for: \"HGBA1C\", \"POCGLU\"    No radiology results for the last day  Scheduled Medications  amLODIPine, 2.5 mg, Oral, BID  atorvastatin, 10 mg, Oral, Daily  bisoprolol, 5 mg, " Oral, Daily  budesonide-formoterol, 2 puff, Inhalation, BID - RT  ferrous sulfate, 325 mg, Oral, Daily  [Held by provider] furosemide, 40 mg, Oral, Daily  [Held by provider] hydroCHLOROthiazide, 12.5 mg, Oral, Daily  levothyroxine, 125 mcg, Oral, Daily  magnesium sulfate, 4 g, Intravenous, Once  pantoprazole, 40 mg, Oral, Q AM  potassium chloride, 40 mEq, Oral, Q2H  sertraline, 100 mg, Oral, Daily    Infusions   Diet  Diet: Cardiac; Healthy Heart (2-3 Na+); Fluid Consistency: Thin (IDDSI 0)       Assessment/Plan     Active Hospital Problems    Diagnosis  POA    **PHUONG (acute kidney injury) [N17.9]  Yes    COPD (chronic obstructive pulmonary disease) [J44.9]  Yes    Alcohol use [F10.90]  Yes    Acute UTI (urinary tract infection) [N39.0]  Yes    Hyponatremia [E87.1]  Yes    Hypomagnesemia [E83.42]  Yes    Hypertension [I10]  Yes    Anemia [D64.9]  Yes      Resolved Hospital Problems   No resolved problems to display.       89 y.o. female admitted with PHUONG (acute kidney injury).      06/19/25  Replete electrolytes, DC IVF per nephrology.    Acute kidney injury  Hyponatremia  -Creatinine 1.47 OA (b/l ~0.9)  -Nephrology following     Hypokalemia, hypomagnesemia, replete    Acute cystitis without hematuria  - Urinalysis nitrite positive, with 4+ bacteria, and 3-5 WBC  - Continue Macrobid      COPD  - Home bronchodilators     Alcohol use  - [place] protocol     Anemia  -Hgb 9.6, previous Hgb 9.5 from 7/18/2024  - Home PO iron     Hypertension  -amlodipine, bisoprolol at home; HCTZ, Lasix ON HOLD  -plan to dc HCTZ permanently         DVT prophylaxis: SCDs  Discussed with patient and nursing staff.  Anticipated discharge SNF, once arrangements made            Main Taveras MD  [facility]  06/19/25  12:55 EDT

## 2025-06-19 NOTE — PLAN OF CARE
Bud Merritt #[id number] (CSN: [id number])  (89 year old M)  (Adm: 17)     [id number]               [address] [facility] BANK: [phone]            Patient Demographics     Patient Name Sex          Age SSN Address Phone    Bud Merritt Male 1927 (89 year old) [id number] [address] [place] [address] [phone] (Home) *Preferred*  NONE (Work)  NONE (Mobile)      Emergency Contact(s)     Name Relation Home Work Mobile    Tierra Merritt Spouse [phone] none none    Jazmine Aragon Daughter [phone] none [phone]    Dante Merritt Son [phone] none [phone]      Admission Information     Attending Provider Admitting Provider Admission Type Admission Date/Time    Cosme Gunn MD Yannopoulos, Demetris, MD Elective 17  0856    Discharge Date Hospital Service Auth/Cert Status Service Area     CSI-Coronary Structure Intervention Incomplete [facility]    Unit Room/Bed Admission Status        U6B [id number] Admission (Confirmed)       Admission     Complaint    Severe Aortic Stenosis , Aortic stenosis, severe, S/P TAVR (transcatheter aortic valve replacement)      Hospital Account     Name Acct ID Class Status Primary Coverage    MerrittBud moody ALEX [id number] Inpatient Open MEDICARE - MEDICARE FOR HB SUPPLEMENT            Guarantor Account (for Hospital Account #[id number])     Name Relation to Pt Service Area Active? Acct Type    Merritt, Bud JOHNSON  FCS Yes Personal/Family    Address Phone          [address] [place] [address] [phone](H)  NONE(O)              Coverage Information (for Hospital Account #[id number])     1. MEDICARE/MEDICARE FOR HB SUPPLEMENT     F/O Payor/Plan Precert #    MEDICARE/MEDICARE FOR HB SUPPLEMENT     Subscriber Subscriber #    Christopher Merrittlibertad JOHNSON [id number]    Address Phone    ATTN CLAIMS  [address]  [place] [address] [phone]          2. BCBS/BCBS PLATINUM BLUE     F/O  Goal Outcome Evaluation:  Plan of Care Reviewed With: patient        Progress: no change  Outcome Evaluation: Pt seen for OT session this AM, pleasantly agreeable despite c/o right side pain at shoulder/ elbow and hip t/o session. Session focused on ADL/fxnl xfer training, strength and endurance through completion of stand pivot step sit BSC xfer w/ Chris+ HHA, toileting w/ Mod A and UE therex/ AROM seated EOB. Patient displays improved tolerance/ endurance, requiring minimal rest breaks t/o session. Pt CGA-Chris + HHA for 3x additional STS xfer from EOB. Tolerating standing 3x for a few secs w/ CGA. Pt continues to benefit from skilled OT intervention to promote return to baseline.    Anticipated Discharge Disposition (OT): skilled nursing facility                         "Payor/Plan Precert #    BCBS/BCBS PLATINUM BLUE     Subscriber Subscriber #    Bud Merritt [id number]    Address Phone    [address] [address]  [place] [address] [phone]                                                      INTERAGENCY TRANSFER FORM - PHYSICIAN ORDERS   9/27/2017                       [address] [facility]: [phone]            Attending Provider: Cosme Gunn MD     Allergies:  Amiodarone, Lasix [Furosemide]    Infection:  None   Service:  CSI-CORONARY    Ht:  1.676 m (5' 6\")   Wt:  68.5 kg (151 lb 0.2 oz)   Admission Wt:  67.6 kg (149 lb 0.5 oz)    BMI:  24.37 kg/m 2   BSA:  1.79 m 2            ED Clinical Impression     Diagnosis Description Comment Added By Time Added    Severe aortic stenosis [I35.0] Severe aortic stenosis [I35.0]  Caryn Fernandez RN 9/27/2017  9:11 AM    S/P TAVR (transcatheter aortic valve replacement) [Z95.2] S/P TAVR (transcatheter aortic valve replacement) [Z95.2]  Ludy Emerson, APRN CNP 9/29/2017  8:46 AM    Acute on chronic systolic congestive heart failure (H) [I50.23] Acute on chronic systolic congestive heart failure (H) [I50.23]  Ludy Emerson, APRN CNP 9/29/2017  2:01 PM      Hospital Problems as of 9/30/2017              Priority Class Noted POA    Aortic stenosis, severe Medium  9/27/2017 Yes    S/P TAVR (transcatheter aortic valve replacement) Medium  9/29/2017 Yes      Non-Hospital Problems as of 9/30/2017              Priority Class Noted    Hypertrophy of prostate without urinary obstruction Medium  11/13/2003    Rheumatoid arthritis involving multiple sites with positive rheumatoid factor (H) Medium  9/16/2010    Hyperlipidemia LDL goal <130 Medium  10/31/2010    Osteoporosis High  5/8/2013    Encounter for long-term current use of medication Medium  6/2/2014    Elevated prostate specific antigen (PSA) Medium  10/12/2015    Rheumatoid arthritis involving multiple sites, unspecified rheumatoid factor presence (H) Medium  " 12/2/2015    Status post total left knee replacement Medium  1/12/2016    Mitral regurgitation and aortic stenosis - AS severe by echo on 3/2017 Medium  1/13/2016    Postoperative delirium Medium  1/15/2016    Infected superficial injury of knee, left, subsequent encounter Medium  3/24/2016    Essential hypertension with goal blood pressure less than 140/90 Medium  6/22/2016    Paroxysmal atrial fibrillation (H) Medium  7/6/2016    Visit for monitoring Tikosyn therapy Medium  1/23/2017    Diarrhea Medium  3/2/2017    Weakness generalized Medium  3/2/2017    Cough Medium  3/2/2017    Acute cystitis without hematuria Medium  3/2/2017    COPD exacerbation (H) Medium  3/2/2017    Influenza A Medium  3/2/2017    Acute respiratory failure with hypoxia (H) Medium  3/2/2017    Atrial fibrillation with rapid ventricular response (H) Medium  3/2/2017    Shock (H) Medium  3/2/2017    Atrial fibrillation (H) Medium  3/4/2017    Malignant neoplasm of prostate (H) Medium  3/22/2017    Cellulitis of right lower extremity Medium  3/27/2017    Venous stasis ulcers of both lower extremities (H) Medium  3/28/2017    Anemia Medium  3/28/2017    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[employer], Bi-Ventricular- dependent Medium  3/28/2017    Dilated cardiomyopathy (H) dilated LV, EF 20-25% by Echo 3/4/17 Medium  3/28/2017    Chronic systolic congestive heart failure (H) Medium  3/28/2017    Immunosuppression (H) Medium  3/28/2017    S/P AV lisandro ablation Medium  6/30/2017      Code Status History     Date Active Date Inactive Code Status Order ID Comments User Context    4/4/2017 10:29 AM 9/27/2017  4:39 PM DNR/DNI [id number]  Sejal Perez MD Outpatient    3/28/2017 12:06 AM 4/4/2017 10:29 AM DNR/DNI [id number]  Cullen Teran MD Inpatient    3/14/2017  1:29 PM 3/28/2017 12:06 AM DNR/DNI [id number]  Blossom Vines MD Outpatient    3/9/2017 12:59 PM 3/14/2017  1:29 PM DNR/DNI [id number]  Michael Lopez MD Inpatient     3/4/2017 10:26 PM 3/9/2017 12:59 PM Full Code [id number]  Ailin Lee MD Inpatient    3/4/2017 12:54 PM 3/4/2017 10:26 PM Full Code [id number]  Sejal Perez MD Outpatient    3/2/2017  6:20 PM 3/4/2017 12:54 PM Full Code [id number]  Cullen Teran MD Inpatient    1/26/2017  6:50 AM 3/2/2017  6:20 PM Full Code [id number]  Luis Eduardo Stack MD Outpatient    1/23/2017  1:22 PM 1/26/2017  6:50 AM Full Code [id number]  Beth Cohn MD Inpatient    1/14/2016 12:59 PM 1/23/2017  1:22 PM Full Code [id number]  Taina Lopez PA-C Outpatient    1/12/2016 11:01 AM 1/14/2016 12:59 PM Full Code [id number]  Cesar Walker DO Inpatient      Current Code Status     Date Active Code Status Order ID Comments User Context       9/27/2017  4:39 PM Full Code [id number]  Filipe Allen MD Inpatient       Summary of Visit     Reason for your hospital stay       You underwent a transcatheter aortic valve replacement. You have done quite well post procedure.                Medication Review      START taking        Dose / Directions Comments    aspirin 81 MG EC tablet        Dose:  81 mg   Take 1 tablet (81 mg) by mouth daily   Quantity:  30 tablet   Refills:  11          CONTINUE these medications which may have CHANGED, or have new prescriptions. If we are uncertain of the size of tablets/capsules you have at home, strength may be listed as something that might have changed.        Dose / Directions Comments    atorvastatin 40 MG tablet   Commonly known as:  LIPITOR   This may have changed:  when to take this   Used for:  Hyperlipidemia LDL goal <130        Dose:  40 mg   Take 1 tablet (40 mg) by mouth daily   Quantity:  90 tablet   Refills:  2        lisinopril 10 MG tablet   Commonly known as:  PRINIVIL/ZESTRIL   This may have changed:    - how much to take  - when to take this   Used for:  Acute on chronic systolic congestive heart failure (H)        Dose:  5 mg    Take 0.5 tablets (5 mg) by mouth daily   Quantity:  62 tablet   Refills:  11        metoprolol 25 MG tablet   Commonly known as:  LOPRESSOR   This may have changed:  See the new instructions.        Dose:  25 mg   Take 1 tablet (25 mg) by mouth 2 times daily   Quantity:  60 tablet   Refills:  11        tamsulosin 0.4 MG capsule   Commonly known as:  FLOMAX   This may have changed:  when to take this        Dose:  0.4 mg   Take 1 capsule (0.4 mg) by mouth daily   Quantity:  60 capsule   Refills:  0          CONTINUE these medications which have NOT CHANGED        Dose / Directions Comments    acetaminophen 650 MG CR tablet   Commonly known as:  TYLENOL        Dose:  650 mg   Take 650 mg by mouth every 8 hours as needed Reported on 4/5/2017   Refills:  0        alendronate 70 MG tablet   Commonly known as:  FOSAMAX   Used for:  Osteopenia        Dose:  70 mg   Take 1 tablet (70 mg) by mouth every 7 days Take with over 8 ounces water and stay upright for at least 30 minutes after dose.  Take at least 60 minutes before breakfast   Quantity:  12 tablet   Refills:  3        apixaban ANTICOAGULANT 2.5 MG tablet   Commonly known as:  ELIQUIS   Used for:  Paroxysmal atrial fibrillation (H)        Dose:  2.5 mg   Take 1 tablet (2.5 mg) by mouth 2 times daily   Quantity:  180 tablet   Refills:  3        ascorbic acid 500 MG Cpcr CR capsule   Commonly known as:  vitamin C        Dose:  500 mg   Take 500 mg by mouth daily   Refills:  0        calcium carbonate 1250 MG tablet   Commonly known as:  OS-SHELIA 500 mg [place]. Ca        Dose:  1 tablet   Take 1 tablet by mouth daily   Refills:  0        dofetilide 125 MCG capsule   Commonly known as:  TIKOSYN   Used for:  Paroxysmal atrial fibrillation (H)        Dose:  125 mcg   Take 1 capsule (125 mcg) by mouth 2 times daily   Quantity:  60 capsule   Refills:  5        HYDROcodone-acetaminophen 5-325 MG per tablet   Commonly known as:  NORCO   Used for:  Rheumatoid arthritis involving  multiple sites, unspecified rheumatoid factor presence (H)        TAKE 1 TABLET BY MOUTH EVERY 6 HOURS AS NEEDED FOR MODERATE TO SEVERE PAIN   Quantity:  30 tablet   Refills:  0        leflunomide 10 MG tablet   Commonly known as:  ARAVA   Used for:  Rheumatoid arthritis involving multiple sites with positive rheumatoid factor (H)        Dose:  10 mg   Take 1 tablet (10 mg) by mouth daily   Quantity:  30 tablet   Refills:  11        LUPRON DEPOT IM        Inject into the muscle every 6 months Reported on 5/3/2017   Refills:  0        predniSONE 5 MG tablet   Commonly known as:  DELTASONE   Used for:  Osteopenia        Dose:  5 mg   Take 1 tablet (5 mg) by mouth daily   Quantity:  100 tablet   Refills:  4        traMADol 50 MG tablet   Commonly known as:  ULTRAM   Used for:  Rheumatoid arthritis involving multiple sites, unspecified rheumatoid factor presence (H)        TAKE 1 TABLET 3 TIMES DAILY AS NEEDED FOR MODERATE PAIN   Quantity:  90 tablet   Refills:  0        VITAMIN D (CHOLECALCIFEROL) PO        Dose:  1 tablet   Take 1 tablet by mouth daily Reported on 5/3/2017   Refills:  0          STOP taking     spironolactone 25 MG tablet   Commonly known as:  ALDACTONE                   After Care     Activity       Your activity upon discharge: activity as tolerated. Specific restrictions attached       Diet       Follow this diet upon discharge: Orders Placed This Encounter      Regular Diet Adult               Further instructions from your care team         Going home after Transcatheter Aortic Valve Replacement (TAVR)  Femoral Access    You have small procedure sites at both groins, the one on the right is slightly bigger. You may have small amounts of bruising or discomfort, this is expected. If you develop worse swelling, redness and warmth that does not go away, fever and chills, Increasing numbness in your legs, worsening pain at the site then you need to contact your nurse coordinator.    You may shower,  but do not soak in a bath tub or pool or apply lotions, ointments, powder, etc for 3-5 days or until sites look healed.     Activity: No lifting, pushing, pulling more than 10 pounds (examples to avoid: groceries, vacuuming, gardening, golfing): For one week with a procedure through the groin.    After the initial healing process of the access site, we recommend cardiac rehabilitation for all TAVR patients. Cardiac rehabilitation will help you:  - Rebuild stamina, strength and balance.  - Learn how to participate in activities safely, as well as help you regain confidence to do so.  - Return to activities of daily living and leisure.  Please contact their central scheduling to arrange at [phone]    We prefer you to follow up with your primary care provider within 2 weeks. You will be arranged to see the TAVR clinic in month. At that time you will have a repeat ultrasound of the heart to look at the valve.     Should you have any questions or concerns please contact your assigned TAVR nurse coordinator:  Ysabel [phone] (at the first prompt enter #1, second prompt enter #3, then ask the triage nurse if you can speak with Ysabel).  Na [phone]  Irene [phone]      Referrals     Cardiac Rehab Referral       Your provider has referred you to:             Follow-Up Appointment Instructions     Adult [facility] Follow-up and recommended labs and tests       Follow up with primary care provider, Camron Aragon, within 7 days for hospital follow- up.  The following labs/tests are recommended: CBC, BMP    Please follow up with cardiology/valve clinic and cardiac rehab, as arranged.      Appointments on [place] and/or [facility] (with [facility] or [facility] provider or service). Call [phone] if you haven't heard regarding these appointments within 7 days of discharge.             Your next 10 appointments already scheduled     Oct 04, 2017  9:45 AM CDT   [facility] Mak Shaver MD  "  [facility]    [address]  [facility] [address]   [phone]            Oct 26, 2017  2:00 PM CDT   Ech Complete with UCECHCR2    [facility] Echo ([facility])    [address]  [address] [facility] [id number] [phone]           1. Please bring or wear a comfortable two-piece outfit. 2. You may eat, drink and take your normal medicines. 3. For any questions that cannot be answered, please contact the ordering physician            Oct 26, 2017  3:00 PM CDT   Lab with  LAB    [facility] ([facility])    [address] [address] [phone]            Oct 26, 2017  3:30 PM CDT   (Arrive by 3:15 PM)   Return Visit with POLLO Reynolds CNP   [facility] ([facility])    [address] [place] [address] [phone]              Statement of Approval     Ordered          09/29/17 1635  I have reviewed and agree with all the recommendations and orders detailed in this document.  EFFECTIVE NOW     Approved and electronically signed by:  Lázaro Monaco MD           09/29/17 1106  I have reviewed and agree with all the recommendations and orders detailed in this document.  EFFECTIVE NOW     Approved and electronically signed by:  Ludy Emerson APRN CNP                                                 INTERAGENCY TRANSFER FORM - NURSING   9/27/2017                       [address] [facility]: [phone]            Attending Provider: Cosme Gunn MD     Allergies:  Amiodarone, Lasix [Furosemide]    Infection:  None   Service:  CSI-CORONARY    Ht:  1.676 m (5' 6\")   Wt:  68.5 kg (151 lb 0.2 oz)   Admission Wt:  67.6 kg (149 lb 0.5 oz)    BMI:  24.37 kg/m 2   BSA:  1.79 m 2            Advance Directives        Does patient have a scanned Advance Directive/ACP document in EPIC?           No      "   Immunizations     Name Date      Influenza (High Dose) 3 valent vaccine 09/30/17     Influenza (High Dose) 3 valent vaccine 03/14/17     Influenza Vaccine IM 3yrs+ 4 Valent IIV4 11/03/14     Pneumococcal 23 valent 03/14/17     TD (ADULT, 7+) 05/16/12     TD (ADULT, 7+) 02/13/10     TD (ADULT, 7+) 08/01/04     Tetanus 04/17/89       ASSESSMENT     Discharge Profile Flowsheet     DISCHARGE NEEDS ASSESSMENT     Inspection under devices  Full 09/30/17 0429    Equipment Currently Used at Home  cane, quad;wheelchair, manual;walker, rolling;shower chair;raised toilet;commode 09/28/17 1332   Not Inspected under devices.  IV/art line hub 09/29/17 2117    Transportation Available  family or friend will provide;car 09/28/17 1332   Skin WDL  ex 09/30/17 0429    # of Referrals Placed by CTS  Post Acute Facilities 01/13/16 1354   Skin Color/Characteristics  pale;redness blanchable 09/29/17 2117    Equipment Used at Home  cane, straight 01/12/16 1151   Skin Temperature  warm 09/30/17 0429    GASTROINTESTINAL (ADULT,PEDIATRIC,OB)     Skin Moisture  dry 09/30/17 0429    GI WDL  WDL 09/30/17 0429   Skin Integrity  bruise(s);drain/device(s);incision(s);scab(s);wound(s) 09/30/17 0429    Last Bowel Movement  09/26/17 09/27/17 1631   Full except areas not inspected   Hip, left;Hip, right;Buttock, left;Buttock, right;Sacrum;Coccyx 09/30/17 0429    COMMUNICATION ASSESSMENT     SAFETY      Patient's communication style  spoken language (English or Bilingual) 09/25/17 1529   Safety WDL  WDL 09/30/17 0429    FINAL RESOURCES     Safety Factors  upper side rails raised x 2, lower side rail raised x 1;bed in low position;wheels locked;call light in reach;upper side rails raised x 2;ID band on 09/30/17 0429    Resources List  Home Care 07/14/17 1436   Safety Equipment  oxygen flowmeter 09/30/17 0429    SKIN     All Alarms  alarm(s) activated and audible 09/30/17 4096    Inspection of bony prominences  Full except (identify areas not inspected)  "09/30/17 0429                      Assessment WDL (Within Defined Limits) Definitions           Safety WDL     Effective: 09/28/15    Row Information: <b>WDL Definition:</b> Bed in low position, wheels locked; call light in reach; upper side rails up x 2; ID band on<br> <font color=\"gray\"><i>Item=AS safety wdl>>List=AS safety wdl>>Version=F14</i></font>      Skin WDL     Effective: 09/28/15    Row Information: <b>WDL Definition:</b> Warm; dry; intact; elastic; without discoloration; pressure points without redness<br> <font color=\"gray\"><i>Item=AS skin wdl>>List=AS skin wdl>>Version=F14</i></font>      Vitals     Vital Signs Flowsheet     VITAL SIGNS     MIKY COMA SCALE      Temp  97.9  F (36.6  C) 09/30/17 0740   Best Eye Response  4-->(E4) spontaneous 09/30/17 0421    Temp src  Oral 09/30/17 0740   Best Motor Response  6-->(M6) obeys commands 09/30/17 0421    Resp  16 09/30/17 0740   Best Verbal Response  5-->(V5) oriented 09/30/17 0421    Pulse  70 09/25/17 1350   Miky Coma Scale Score  15 09/30/17 0421    Heart Rate  69 09/30/17 0739   HEIGHT AND WEIGHT      BP  112/62 09/30/17 0740   Height  1.676 m (5' 6\") 09/27/17 0920    BP Location  Right arm 09/30/17 0740   Weight  68.5 kg (151 lb 0.2 oz) 09/30/17 0653    Patient Position  Lying 09/27/17 1505   Weight Method  Bed scale 09/30/17 0653    OXYGEN THERAPY     BSA (Calculated - sq m)  1.77 09/27/17 0920    SpO2  97 % 09/30/17 0739   BMI (Calculated)  24.1 09/27/17 0920    O2 Device  None (Room air) 09/30/17 0740   [REMOVED] RIGHT GROIN INTERVENTIONAL PROCEDURE ACCESS      Oxygen Delivery  2 LPM 09/29/17 1703   Site Assessment  WDL 09/30/17 0421    RESPIRATORY MONITORING     CMS Right Extremity  [facility] 09/29/17 2105    Respiratory Monitoring (EtCO2)  29 mmHg 09/28/17 0329   Dorsalis Pulse - Right Leg  Present with doppler 09/29/17 2105    Integrated Pulmonary Index (IPI)  8-9 09/28/17 0329   Popliteal Pulse - Right Leg  Present with doppler 09/29/17 2105    " PACEMAKER     Posterior Tibial Pulse - Right Leg  Present with doppler 09/29/17 2105    Pacemaker  Permanent 09/30/17 0421   [REMOVED] LEFT GROIN INTERVENTIONAL PROCEDURE ACCESS      Pacemaker Type  Transcutaneous 09/30/17 0421   Site Assessment  WDL 09/30/17 0421    PAIN/COMFORT     CMS Left Extremity  WDL 09/29/17 2105    Patient Currently in Pain  sleeping: patient not able to self report 09/30/17 0421   Dorsalis Pulse - Left Leg  Present with doppler 09/29/17 2105    Preferred Pain Scale  CAPA (Clinically Aligned Pain Assessment) ([facility] Adults Only) 09/30/17 0421   Popliteal Pulse - Left Leg  Present with doppler 09/29/17 2105    Pain Location  Hip 09/29/17 2350   Posterior Tibial Pulse - Left Leg  Present with doppler 09/29/17 2105    Pain Orientation  Left 09/29/17 2350   POSITIONING      Pain Descriptors  Constant 09/29/17 2350   Body Position  side-lying, right 09/30/17 0421    Pain Intervention(s)  Medication (See eMAR);Distraction;Emotional support 09/29/17 2351   Head of Bed (HOB)  HOB at 30-45 degrees 09/30/17 0421    Response to Interventions  Decrease in pain 09/29/17 2351   Positioning/Transfer Devices  pillows 09/30/17 0421    CLINICALLY ALIGNED PAIN ASSESSMENT (CAPA) ([facility] ADULTS ONLY)     Chair  Upright in chair 09/29/17 2117    Comfort  comfortably manageable 09/30/17 0421   DAILY CARE      Change in Pain  getting better 09/30/17 0421   Activity Management  activity adjusted per tolerance 09/30/17 0429    Pain Control  fully effective 09/30/17 0421   Activity Assistance Provided  assistance, 1 person 09/30/17 0429    Functioning  can do most things, but pain gets in the way of some 09/30/17 0421   ECG      Sleep  normal sleep 09/30/17 0421   ECG Rhythm  Paced rhythm 09/30/17 0421    ANALGESIA SIDE EFFECTS MONITORING     Ectopy  PVC 09/29/17 2350    Side Effects Monitoring: Respiratory Quality  R 09/30/17 0421   Ectopy Frequency  Occasional 09/29/17 0431     Side Effects Monitoring: Respiratory Depth  N 09/30/17 0421   Lead Monitored  Lead II;V 1 09/29/17 1528    Side Effects Monitoring: Sedation Level  S 09/30/17 0421                 Patient Lines/Drains/Airways Status    Active LINES/DRAINS/AIRWAYS     Name: Placement date: Placement time: Site: Days: Last dressing change:    Pressure Injury 09/27/17 Posterior;Right Leg Stage 2 09/27/17   2000    2     Wound 03/28/17 Left;Lower Leg Other (comment) blister 03/28/17   0027   Leg   186     Wound 03/28/17 Lower;Right Leg Other (comment) redness 03/28/17   0224   Leg   186     Wound 03/28/17 Left;Anterior Foot Other (comment) blister 03/28/17   0027   Foot   186     Wound 03/28/17 Right;Lower;Anterior Leg Other (comment) blister 03/28/17   0027   Leg   186     Wound 03/28/17 Right;Anterior Foot Other (comment) blister 03/28/17   0027   Foot   186     Wound 09/27/17 Anterior;Right Leg Abrasion(s) 09/27/17   1630   Leg   2     Incision/Surgical Site 03/15/16 Left Knee 03/15/16   1218    563     Incision/Surgical Site 09/27/17 Right Groin 09/27/17   1443    2     Incision/Surgical Site 09/27/17 Left Groin 09/27/17   1443    2             Patient Lines/Drains/Airways Status    Active PICC/CVC     None            Intake/Output Detail Report     Date Intake     Output   Net    Shift P.O. I.V. IV Piggyback Total Urine Blood Total       Day 09/29/17 0000 - 09/29/17 0659 160 -- -- 160 575 -- 575 -415    Ruthie 09/29/17 0700 - 09/29/17 1459 290 250 -- 540 200 -- 200 340    Noc 09/29/17 1500 - 09/29/17 2359 -- 250 -- 250 200 -- 200 50    Day 09/30/17 0000 - 09/30/17 0659 -- 1837.5 -- 1837.5 300 -- 300 1537.5    Ruthie 09/30/17 0700 - 09/30/17 1459 -- -- -- -- -- -- -- 0      Last Void/BM       Most Recent Value    Urine Occurrence 1 at 09/29/2017 0700    Stool Occurrence 1 at 09/29/2017 0700      Case Management/Discharge Planning     Case Management/Discharge Planning Flowsheet     REFERRAL INFORMATION     Equipment Used at Home  cane,  straight 01/12/16 1151    Arrived From  home or self-care 03/29/17 1432   FINAL RESOURCES      # of Referrals Placed by CTS  Post Acute Facilities 01/13/16 1354   Equipment Currently Used at Home  cane, quad;wheelchair, manual;walker, rolling;shower chair;raised toilet;commode 09/28/17 1332    Primary Care Clinic Name  [facility] 03/29/17 1434   Resources List  Home Care 07/14/17 1436    Primary Care MD Name  Helio Mahesh 03/29/17 1434   MH/ CAREGIVER      LIVING ENVIRONMENT     Filed Complexity Screen Score  9 09/28/17 1012    Lives With  spouse 09/28/17 1332   ABUSE RISK SCREEN      Living Arrangements  house 09/28/17 1332   QUESTION TO PATIENT:  Has a member of your family or a partner(now or in the past) intimidated, hurt, manipulated, or controlled you in any way?  no 09/27/17 0939    COPING/STRESS     QUESTION TO PATIENT: Do you feel safe going back to the place where you are living?  yes 09/27/17 0939    Major Change/Loss/Stressor  none 09/28/17 0555   OBSERVATION: Is there reason to believe there has been maltreatment of a vulnerable adult (ie. Physical/Sexual/Emotional abuse, self neglect, lack of adequate food, shelter, medical care, or financial exploitation)?  no 09/27/17 0939    Patient Personal Strengths  self-reliant 03/29/17 1434   (R) MENTAL HEALTH SUICIDE RISK      DISCHARGE PLANNING     Are you depressed or being treated for depression?  No 09/28/17 0600    Transportation Available  family or friend will provide;car 09/28/17 1332                       [address] [facility] BANK: [phone]            Medication Administration Report for Bud Merritt as of 09/30/17 1033   Legend:    Given Hold Not Given Due Canceled Entry Other Actions    Time Time (Time) Time  Time-Action       Inactive    Active    Linked        Medications 09/24/17 09/25/17 09/26/17 09/27/17 09/28/17 09/29/17 09/30/17    acetaminophen (TYLENOL) tablet 325-650 mg  Dose: 325-650 mg Freq: EVERY 4 HOURS PRN Route: PO  PRN  Reasons: mild pain,headaches  Start: 09/27/17 1639   Admin Instructions: Maximum acetaminophen dose from all sources = 75 mg/kg/day not to exceed 4 grams/day.         1102 (650 mg)-Given       1730 (650 mg)-Given             apixaban ANTICOAGULANT (ELIQUIS) tablet 2.5 mg  Dose: 2.5 mg Freq: 2 TIMES DAILY Route: PO  Start: 09/27/17 2000       1948 (2.5 mg)-Given [C]        0807 (2.5 mg)-Given       1954 (2.5 mg)-Given        1147 (2.5 mg)-Given       2218 (2.5 mg)-Given        0825 (2.5 mg)-Given       [ ] 2000           aspirin EC EC tablet 81 mg  Dose: 81 mg Freq: DAILY Route: PO  Start: 09/28/17 0800   Admin Instructions: Starting tomorrow.         0807 (81 mg)-Given        1145 (81 mg)-Given        0825 (81 mg)-Given           atorvastatin (LIPITOR) tablet 40 mg  Dose: 40 mg Freq: EVERY EVENING Route: PO  Start: 09/27/17 2000       1935 (40 mg)-Given        1953 (40 mg)-Given        2039 (40 mg)-Given        [ ] 2000           HOLD:  Metformin and metformin containing medications if patient received IV contrast  Freq: HOLD Route: XX  Start: 09/27/17 1639   Admin Instructions: Hold metformin (GLUCOPHAGE, GLUMETZA, FORTAMET, RIOMET) and metformin containing medications: alogliptin/metformin (KAZANO), glipizide/metformin (METAGLIP), glyburide/metformin (GLUCOVANCE), rosiglitazone/metformin (AVANDAMET), dapagliflozin/metformin (XIGDUO XR), sitagliptin/metformin (JANUMET, JANUMET XR), linagliptin/metformin (JENTADUETO), repaglinide/metformin (PRANDIMET), saxagliptin/metformin (KOMBIGLYZE XR), canagliflozin/metformin (INVOKAMET), and pioglitazone/metformin (ACTOPLUS MET, ACTOPLUS MET XR) on day of the procedure and for 48 hours after IV contrast given. If patient was on one of these medications pre-procedure,  continue to HOLD for 48 hours post-procedure.               hydrALAZINE (APRESOLINE) injection 10 mg  Dose: 10 mg Freq: EVERY 6 HOURS PRN Route: IV  PRN Reason: high blood pressure  Start: 09/27/17 2113        "1748 (10 mg)-Given              HYDROcodone-acetaminophen (NORCO) 5-325 MG per tablet 1 tablet  Dose: 1 tablet Freq: EVERY 6 HOURS PRN Route: PO  PRN Reason: moderate to severe pain  Start: 09/27/17 1430   Admin Instructions: Maximum acetaminophen dose from all sources= 75 mg/kg/day not to exceed 4 grams        1714 (1 tablet)-Given       2328 (1 tablet)-Given        0534 (1 tablet)-Given       1831 (1 tablet)-Given        0039 (1 tablet)-Given       1201 (1 tablet)-Given       1817 (1 tablet)-Given        0004 (1 tablet)-Given       0638 (1 tablet)-Given           lidocaine (LMX4) kit  Freq: EVERY 1 HOUR PRN Route: Top  PRN Reason: pain  PRN Comment: with VAD insertion or accessing implanted port.  Start: 09/27/17 1419   Admin Instructions: Do NOT give if patient has a history of allergy to any local anesthetic or any \"sunita\" product.  Apply 30 minutes prior to VAD insertion or port access. MAX Dose: 2.5 g (  of 5 g tube).               lidocaine 1 % 1 mL  Dose: 1 mL Freq: EVERY 1 HOUR PRN Route: OTHER  PRN Comment: mild pain with VAD insertion or accessing implanted port  Start: 09/27/17 1419   Admin Instructions: Do NOT give if patient has a history of allergy to any local anesthetic or any \"sunita\" product. MAX dose 1 mL subcutaneous OR intradermal in divided doses.               lisinopril (PRINIVIL/ZESTRIL) tablet 10 mg  Dose: 10 mg Freq: DAILY Route: PO  Start: 09/30/17 0800          0825 (10 mg)-Given           magnesium sulfate 2 g in NS intermittent infusion ([employer] or [facility])  Dose: 2 g Freq: DAILY PRN Route: IV  PRN Reason: magnesium supplementation  Start: 09/28/17 0447   Admin Instructions: For Serum Mg++ 1.6 - 2 mg/dL  Give 2 g and recheck magnesium level next AM.         0510 (2 g)-New Bag             magnesium sulfate 4 g in 100 mL sterile water (premade)  Dose: 4 g Freq: EVERY 4 HOURS PRN Route: IV  PRN Reason: magnesium supplementation  Start: 09/28/17 0447   Admin Instructions: For serum Mg++ " less than 1.6 mg/dL  Give 4 g and recheck magnesium level 2 hours after dose, and next AM.               metoprolol (LOPRESSOR) tablet 25 mg  Dose: 25 mg Freq: 2 TIMES DAILY Route: PO  Start: 09/28/17 1319        1953 (25 mg)-Given        1145 (25 mg)-Given       (2118)-Not Given [C]        0825 (25 mg)-Given       [ ] 2000           naloxone (NARCAN) injection 0.1-0.4 mg  Dose: 0.1-0.4 mg Freq: EVERY 2 MIN PRN Route: IV  PRN Reason: opioid reversal  Start: 09/27/17 1639   Admin Instructions: For respiratory rate LESS than or EQUAL to 8.  Partial reversal dose:  0.1 mg titrated q 2 minutes for Analgesia Side Effects Monitoring Sedation Level of 3 (frequently drowsy, arousable, drifts to sleep during conversation).Full reversal dose:  0.4 mg bolus for Analgesia Side Effects Monitoring Sedation Level of 4 (somnolent, minimal or no response to stimulation).               nitroGLYcerin (NITROSTAT) sublingual tablet 0.4 mg  Dose: 0.4 mg Freq: EVERY 5 MIN PRN Route: SL  PRN Reason: chest pain  Start: 09/27/17 1639   Admin Instructions: Administer every 5 minutes as needed. Maximum 3 doses in 15 minutes. Notify provider if no relief after 3 doses.  3 doses per episode               ondansetron (ZOFRAN) injection 4 mg  Dose: 4 mg Freq: EVERY 6 HOURS PRN Route: IV  PRN Reasons: nausea,vomiting  Start: 09/29/17 1047   Admin Instructions: Irritant.          1148 (4 mg)-Given            potassium chloride (KLOR-CON) Packet 20-40 mEq  Dose: 20-40 mEq Freq: EVERY 2 HOURS PRN Route: ORAL OR FEED  PRN Reason: potassium supplementation  Start: 09/28/17 3826   Admin Instructions: Use if unable to tolerate tablets.    If Serum K+ 3.4-4.0, dose = 20 mEq x1. Recheck K+ level the next AM.  If Serum K+ 3.0-3.3, dose = 60 mEq po total dose (40 mEq x 1 followed in 2 hours by 20 mEq X1). Recheck K+ level 4 hours after dose and the next AM.  If Serum K+ 2.5-2.9, dose = 80 mEq po total dose (40 mEq Q2H x2). Recheck K+ level 4 hours after dose  and the next AM.  If Serum K+ less than 2.5, See IV order.  Dissolve packet contents in 4-8 ounces of cold water or juice.               potassium chloride 10 mEq in 100 mL intermittent infusion  Dose: 10 mEq Freq: EVERY 1 HOUR PRN Route: IV  PRN Reason: potassium supplementation  Start: 09/28/17 0446   Admin Instructions: Infuse via PERIPHERAL LINE or CENTRAL LINE. Use for central line replacement if patient weight less than 65 kg, if patient is on TPN with high potassium content or if unit does not stock 20 mEq bags.  If Serum K+ 3.4-4.0, dose = 10 mEq/hr x2 doses. Recheck K+ level the next AM.  If Serum K+ 3.0-3.3, dose = 10 mEq/hr x4 doses (40 mEq IV total dose). Recheck K+ level 2 hours after dose and the next AM.  If Serum K+ less than 3.0, dose = 10 mEq/hr x6 doses (60 mEq IV total dose). Recheck K+ level 2 hours after dose and the next AM.               potassium chloride 10 mEq in 100 mL intermittent infusion with 10 mg lidocaine  Dose: 10 mEq Freq: EVERY 1 HOUR PRN Route: IV  PRN Reason: potassium supplementation  Start: 09/28/17 0446   Admin Instructions: Infuse via PERIPHERAL LINE. Use potassium with lidocaine for pain with peripheral administration.  If Serum K+ 3.4-4.0, dose = 10 mEq/hr x2 doses. Recheck K+ level the next AM.  If Serum K+ 3.0-3.3, dose = 10 mEq/hr x4 doses (40 mEq IV total dose). Recheck K+ level 2 hours after dose and the next AM.  If Serum K+ less than 3.0, dose = 10 mEq/hr x6 doses (60 mEq IV total dose). Recheck K+ level 2 hours after dose and the next AM.               potassium chloride 20 mEq in 100 mL NON-STANDARD CONC intermittent infusion  Dose: 20 mEq Freq: EVERY 1 HOUR PRN Route: IV  PRN Reason: potassium supplementation  Start: 09/28/17 0446   Admin Instructions: Infuse via CENTRAL LINE Only.  May need EKG if less than 65 kg or on TPN - Max rate is 0.3 mEq/kg/hr for patients not on EKG monitoring.    If Serum K+ 3.4-4.0, dose = 20 mEq/hr x1 doses. Recheck K+ level the next  AM.  If Serum K+ 3.0-3.3, dose = 20 mEq/hr x2 doses (40 mEq IV total dose).  Recheck K+ level 2 hours after dose and the next AM.  If Serum K+ less than 3.0, dose = 20 mEq/hr x3 doses (60 mEq IV total dose). Recheck K+ level 2 hours after dose and the next AM.               potassium chloride SA (K-DUR/KLOR-CON M) CR tablet 20-40 mEq  Dose: 20-40 mEq Freq: EVERY 2 HOURS PRN Route: PO  PRN Reason: potassium supplementation  Start: 09/28/17 0446   Admin Instructions: Use if able to take PO.   If Serum K+ 3.4-4.0, dose = 20 mEq x1. Recheck K+ level the next AM.  If Serum K+ 3.0-3.3, dose = 60 mEq po total dose (40 mEq x1 followed in 2 hours by 20 mEq x1). Recheck K+ level 4 hours after dose and the next AM.  If Serum K+ 2.5-2.9, dose = 80 mEq po total dose (40 mEq Q2H x2). Recheck K+ level 4 hours after dose and the next AM.  If Serum K+ less than 2.5, See IV order.  DO NOT CRUSH.         0534 (20 mEq)-Given        0558 (20 mEq)-Given            sodium chloride (PF) 0.9% PF flush 3 mL  Dose: 3 mL Freq: EVERY 8 HOURS Route: IK  Start: 09/27/17 1645   Admin Instructions: And Q1H PRN, to lock peripheral IV dormant line.        1748 (3 mL)-Given       (2339)-Not Given        0807 (3 mL)-Given       (1646)-Not Given       2338 (3 mL)-Given        (1147)-Not Given       (1704)-Not Given        (0012)-Not Given       (0834)-Not Given       [ ] 1600           sodium chloride (PF) 0.9% PF flush 3 mL  Dose: 3 mL Freq: EVERY 1 HOUR PRN Route: IK  PRN Reason: line flush  Start: 09/27/17 1639   Admin Instructions: for peripheral IV flush post IV meds          2341 (3 mL)-Given           Completed Medications  Medications 09/24/17 09/25/17 09/26/17 09/27/17 09/28/17 09/29/17 09/30/17         Dose: 1 tablet Freq: ONCE Route: PO  Start: 09/29/17 0530   End: 09/29/17 0533   Admin Instructions: Maximum acetaminophen dose from all sources= 75 mg/kg/day not to exceed 4 grams          0533 (1 tablet)-Given              Dose: 0.5 mL Freq:  PRIOR TO DISCHARGE Route: IM  Start: 09/27/17 1645   End: 09/30/17 1022   Admin Instructions: Administer when afebrile (less than 100.4F) x 24 hours, or Prior to Discharge.  If not administering when scheduled , change the due time by following the instructions in the reference link below.  If patient refuses vaccine, chart as Vaccine Refused.           1022 (0.5 mL)-Given             Dose: 100 mL Freq: ONCE Route: IV  Start: 09/28/17 1200   End: 09/28/17 1147        1147 (100 mL)-Given               Dose: 50 mL Freq: ONCE Route: IA  Start: 09/27/17 1430   End: 09/27/17 1430       1430 (160 mL)-Given                Dose: 1,000 mL Freq: ONCE Route: IV  Last Dose: 1,000 mL (09/28/17 0020)  Start: 09/28/17 0015   End: 09/28/17 0220        0020 (1,000 mL)-New Bag               Dose: 4 mL Freq: ONCE Route: IV  Start: 09/28/17 1030   End: 09/28/17 1030        1030 (4 mL)-Given               Dose: 90 mL Freq: ONCE Route: IV  Start: 09/28/17 1200   End: 09/28/17 1147        1147 (90 mL)-Given            Discontinued Medications  Medications 09/24/17 09/25/17 09/26/17 09/27/17 09/28/17 09/29/17 09/30/17         Rate: 250 mL/hr Freq: CONTINUOUS Route: IV  Last Dose: Stopped (09/30/17 0809)  Start: 09/29/17 1500   End: 09/30/17 0804         1400 ( )-New Bag [C]       1500 ( )-Rate/Dose Verify [C]       2344 ( )-New Bag        0804-Med Discontinued  0809-Stopped             Rate: 250 mL/hr Freq: CONTINUOUS Route: IV  Start: 09/29/17 1500   End: 09/30/17 0804         2039 ( )-Rate/Dose Verify       2130 ( )-Rate/Dose Verify        0804-Med Discontinued         Rate: 500 mL/hr Freq: CONTINUOUS Route: IV  Start: 09/28/17 0700   End: 09/28/17 1320        0656 ( )-New Bag       1320-Med Discontinued           Rate: 75 mL/hr Freq: CONTINUOUS Route: IV  Start: 09/27/17 0915   End: 09/27/17 1447   Admin Instructions: Start 2 hours prior to procedure.  IF PATIENT IS RECEIVING RENAL DIALYSIS, RN to reduce rate of 0.9% sodium chloride IV  solution to 10 ml/hour (TKO) IV infusion to prevent fluid overload.               1447-Med Discontinued            Dose: 325 mg Freq: DAILY Route: PO  Start: 09/27/17 0911   End: 09/27/17 1447   Admin Instructions: Give on admission to pre-procedure area;  HOLD if patient has taken home dose morning of the procedure        (1004)-Not Given       1447-Med Discontinued            Dose: 2 g Freq: PRE-OP/PRE-PROCEDURE Route: IV  Indications of Use: SURGICAL PROPHYLAXIS  Start: 09/27/17 0911   End: 09/27/17 1447   Admin Instructions: Give within 1 hour PRIOR to procedure in pre-procedure area or in Cath Lab for inpatients. If patient weight is greater than or equal to 120 kg change dose to 3 g.        1447-Med Discontinued            Rate: 4 mL/hr Freq: CONTINUOUS Route: IV  Start: 09/27/17 0945   End: 09/27/17 1637              1637-Med Discontinued            Dose: 125 mcg Freq: EVERY 12 HOURS SCHEDULED Route: PO  Start: 09/29/17 0800   End: 09/29/17 1101   Admin Instructions: Only providers certified by [employer] can initiate new orders for dofetilide.          1101-Med Discontinued                Dose: 125 mcg Freq: EVERY 12 HOURS SCHEDULED Route: PO  Start: 09/28/17 2000   End: 09/28/17 1320   Admin Instructions: Only providers certified by [employer] can initiate new orders for dofetilide.         1320-Med Discontinued           Rate: 6.1-60.8 mL/hr Freq: CONTINUOUS Route: IV  Start: 09/27/17 0945   End: 09/27/17 1637   Admin Instructions: MD to titrate  Protect from light. Vesicant.               1637-Med Discontinued            Dose: 25-50 mcg Freq: EVERY 2 MIN PRN Route: IV  PRN Reason: other  PRN Comment: acute pain  Start: 09/27/17 1500   End: 09/27/17 1637   Admin Instructions: MAX cumulative dose = 250 mcg.    Use Fentanyl initially, as a short acting agent for acute pain control.  If insufficient, or a longer acting agent is needed, begin Morphine or Hydromorphone if ordered.        1637-Med Discontinued           "  Freq: PRN  Start: 09/27/17 1423   End: 09/27/17 1637       1423 (2 mL)-Given [C]       1637-Med Discontinued            Freq: HOLD Route: XX  Start: 09/27/17 0911   End: 09/27/17 1447       1447-Med Discontinued            Dose: 2.5-5 mg Freq: EVERY 10 MIN PRN Route: IV  PRN Reason: high blood pressure  PRN Comment: for Systolic Blood Pressure greater than 160 and Heart Rate greater than 60 bpm.  Start: 09/27/17 1500   End: 09/27/17 1637   Admin Instructions: Max cumulative dose = 20 mg.  FOR USE IN PACU ONLY, DC WHEN TRANSFERRED TO FLOOR.        1637-Med Discontinued            Rate: 100 mL/hr Freq: CONTINUOUS Route: IV  Start: 09/27/17 1515   End: 09/27/17 1637   Admin Instructions: Continue until IV catheter is weaned               1637-Med Discontinued            Freq: EVERY 1 HOUR PRN Route: Top  PRN Reason: pain  PRN Comment: with VAD insertion or accessing implanted port.  Start: 09/27/17 0911   End: 09/27/17 1447   Admin Instructions: Do NOT give if patient has a history of allergy to any local anesthetic or any \"sunita\" product.   Apply 30 minutes prior to VAD insertion or port access. MAX Dose: 2.5 g (  of 5 g tube)        1447-Med Discontinued            Dose: 1 mL Freq: EVERY 1 HOUR PRN Route: OTHER  PRN Comment: mild pain with VAD insertion or accessing implanted port  Start: 09/27/17 0911   End: 09/27/17 1447   Admin Instructions: Do NOT give if patient has a history of allergy to any local anesthetic or any \"sunita\" product. MAX dose 1 mL subcutaneous OR intradermal in divided doses.        1447-Med Discontinued            Dose: 10 mg Freq: 2 TIMES DAILY Route: PO  Start: 09/29/17 0800   End: 09/29/17 1400         1145 (10 mg)-Given       1400-Med Discontinued          Dose: 10 mg Freq: 2 TIMES DAILY Route: PO  Start: 09/27/17 1800   End: 09/28/17 0717       1854 (10 mg)-Given        0717-Med Discontinued           Dose: 25 mg Freq: 2 TIMES DAILY Route: PO  Start: 09/29/17 0800   End: 09/28/17 1319    "     1319-Med Discontinued           Dose: 25 mg Freq: 2 TIMES DAILY Route: PO  Start: 09/27/17 2000   End: 09/28/17 0717       1935 (25 mg)-Given        0717-Med Discontinued           Dose: 0.1-0.4 mg Freq: EVERY 2 MIN PRN Route: IV  PRN Reason: opioid reversal  Start: 09/27/17 1200   End: 09/27/17 1637   Admin Instructions: For respiratory rate LESS than or EQUAL to 8.  Partial reversal dose:  0.1 mg titrated q 2 minutes for Analgesia Side Effects Monitoring Sedation Level of 3 (frequently drowsy, arousable, drifts to sleep during conversation).Full reversal dose:  0.4 mg bolus for Analgesia Side Effects Monitoring Sedation Level of 4 (somnolent, minimal or no response to stimulation).        1637-Med Discontinued            Rate: 1.9-25.4 mL/hr Freq: CONTINUOUS Route: IV  Last Dose: Stopped (09/27/17 1400)  Start: 09/27/17 0945   End: 09/27/17 1637   Admin Instructions: MD to titrate  Protect from light. Vesicant.        1345 (0.03 mcg/kg/min)-New Bag       1400-Stopped       1637-Med Discontinued            Dose: 4 mg Freq: EVERY 30 MIN PRN Route: PO  PRN Reason: nausea  Start: 09/27/17 1500   End: 09/27/17 1637   Admin Instructions: MAX total dose = 8 mg, including OR dosing. If not resolved in 15 minutes, then go to step 2 (Prochlorperazine if ordered).        1637-Med Discontinued         Or    Dose: 4 mg Freq: EVERY 30 MIN PRN Route: IV  PRN Reason: nausea  Start: 09/27/17 1500   End: 09/27/17 1637   Admin Instructions: MAX total dose = 8 mg, including OR dosing. If not resolved in 15 minutes, then go to step 2 (Prochlorperazine if ordered).  Irritant.        1637-Med Discontinued            Rate: 4.1-8.1 mL/hr Freq: CONTINUOUS Route: IV  Last Dose: Stopped (09/27/17 1410)  Start: 09/27/17 1200   End: 09/27/17 1637   Admin Instructions: Provider to titrate        1226 (0.05 mcg/kg/min)-New Bag       1239 (0.03 mcg/kg/min)-Rate/Dose Change       1410-Stopped       1637-Med Discontinued            Dose: 3 mL  Freq: EVERY 8 HOURS Route: IK  Start: 09/27/17 0911   End: 09/27/17 1447   Admin Instructions: And Q1H PRN, to lock peripheral IV dormant line.               1447-Med Discontinued            Dose: 3 mL Freq: EVERY 1 HOUR PRN Route: IK  PRN Reason: line flush  Start: 09/27/17 0911   End: 09/27/17 1447   Admin Instructions: for peripheral IV flush post IV meds        1447-Med Discontinued            Dose: 25 mg Freq: DAILY Route: PO  Start: 09/29/17 0800   End: 09/29/17 1400         1145 (25 mg)-Given       1400-Med Discontinued          Dose: 25 mg Freq: DAILY Route: PO  Start: 09/28/17 0800   End: 09/28/17 0717        0717-Med Discontinued           Dose: 0.4 mg Freq: 2 TIMES DAILY Route: PO  Start: 09/27/17 2000   End: 09/29/17 1400   Admin Instructions: Administer 30 minutes after the same meal each day.  Capsules should be swallowed whole; do not crush chew or open.        1935 (0.4 mg)-Given        0807 (0.4 mg)-Given       1953 (0.4 mg)-Given        1138 (0.4 mg)-Given       1400-Med Discontinued     Medications 09/24/17 09/25/17 09/26/17 09/27/17 09/28/17 09/29/17 09/30/17               INTERAGENCY TRANSFER FORM - NOTES (H&P, Discharge Summary, Consults, Procedures, Therapies)   9/27/2017                       [address] [facility] BANK: [phone]               History & Physicals      H&P by Ludy Emerson APRN CNP at 9/27/2017  3:53 PM     Author:  Ludy Emerson APRN CNP Service:  Cardiology Author Type:  Nurse Practitioner    Filed:  9/28/2017 12:51 PM Date of Service:  9/27/2017  3:53 PM Creation Time:  9/27/2017  3:53 PM    Status:  Signed :  Ludy Emerson APRN CNP (Nurse Practitioner)               History and Physical     Bud Merritt  MRN# [id number]        Date of Admission:  9/27/2017    HPI: Bud Merritt is a[AS1.1] 89 year old male with PMHx pertinent for Afib on apixiban,[AS1.2] s/p AV lisandro ablation with BiV[AS1.3] PPM[AS1.2],[AS1.3] (3/2017) COPD, prostate cancer, RA and  chronic pain[AS1.2] and severe aortic stenosis severe characterized by an area of 0.9 cm2, mean gradient 36 mmHg and peak velocity of 3.78 m/sec with LVEF 20-25% by echocardiogram on 3/2017. Patient presents to [facility] today for transcatheter aortic valve replacement.[AS1.3]       Past Medical History:  Past Medical History:   Diagnosis Date     Anemia      Atrial fibrillation (H) 07/01/2016     Cardiac pacemaker 03/10/2017     Cardiomyopathy (H)      CHF (congestive heart failure) (H)      COPD exacerbation (H) 3/2/2017     Depressive disorder      History of prostate cancer      Paroxysmal atrial fibrillation (H) 7/6/2016     Pure hypercholesterolemia      Rheumatoid arthritis(714.0)      Unspecified essential hypertension      Weakness generalized 3/2/2017       Past Surgical History:  Past Surgical History:   Procedure Laterality Date     ARTHROPLASTY KNEE Left 1/12/2016    Procedure: ARTHROPLASTY KNEE;  Surgeon: Cesar Walker DO;  Location: [facility] OR     COLONOSCOPY  08/24/09     HC COLONOSCOPY THRU STOMA W BIOPSY/CAUTERY TUMOR/POLYP/LESION  8/31/2004     HC REPAIR ING HERNIA,5+Y/O,REDUCIBL  1996    Marlex mesh repair of bilateral inguinal hernias and drainage of bilateral scrotal hydroceles.     IMPLANT PACEMAKER  03/10/2017     IRRIGATION AND DEBRIDEMENT SOFT TISSUE LOWER EXTREMITY, COMBINED Left 3/15/2016    Procedure: COMBINED IRRIGATION AND DEBRIDEMENT SOFT TISSUE LOWER EXTREMITY;  Surgeon: Cesar Walker DO;  Location: [facility] OR       Allergies:     Allergies   Allergen Reactions     Amiodarone Other (See Comments)     Drop in DLCO     Lasix [Furosemide] Blisters     Blisters and sores in his mouth.        Medications:    No current facility-administered medications on file prior to encounter.   Current Outpatient Prescriptions on File Prior to Encounter:  HYDROcodone-acetaminophen (NORCO) 5-325 MG per tablet TAKE 1 TABLET BY MOUTH EVERY 6 HOURS AS NEEDED FOR MODERATE TO SEVERE PAIN   traMADol  (ULTRAM) 50 MG tablet TAKE 1 TABLET 3 TIMES DAILY AS NEEDED FOR MODERATE PAIN   leflunomide (ARAVA) 10 MG tablet Take 1 tablet (10 mg) by mouth daily   dofetilide (TIKOSYN) 125 MCG capsule Take 1 capsule (125 mcg) by mouth 2 times daily   metoprolol (LOPRESSOR) 50 MG tablet TAKE 1 TABLET (50 MG) BY MOUTH 2 TIMES DAILY   atorvastatin (LIPITOR) 40 MG tablet Take 1 tablet (40 mg) by mouth daily (Patient taking differently: Take 40 mg by mouth every evening )   predniSONE (DELTASONE) 5 MG tablet Take 1 tablet (5 mg) by mouth daily   lisinopril (PRINIVIL,ZESTRIL) 10 MG tablet Take 1 tablet (10 mg) by mouth 2 times daily   tamsulosin (FLOMAX) 0.4 MG 24 hr capsule Take 1 capsule (0.4 mg) by mouth 2 times daily   ascorbic acid (VITAMIN C) 500 MG CPCR Take 500 mg by mouth daily   VITAMIN D, CHOLECALCIFEROL, PO Take 1 tablet by mouth daily Reported on 5/3/2017   calcium carbonate (OS-SHELIA 500 MG [place]. [place]) 500 MG tablet Take 1 tablet by mouth daily    apixaban ANTICOAGULANT (ELIQUIS) 2.5 MG tablet Take 1 tablet (2.5 mg) by mouth 2 times daily (Patient not taking: Reported on 9/25/2017)   Leuprolide Acetate (LUPRON DEPOT IM) Inject into the muscle every 6 months Reported on 5/3/2017   acetaminophen (TYLENOL) 650 MG CR tablet Take 650 mg by mouth every 8 hours as needed Reported on 4/5/2017   alendronate (FOSAMAX) 70 MG tablet Take 1 tablet (70 mg) by mouth every 7 days Take with over 8 ounces water and stay upright for at least 30 minutes after dose.  Take at least 60 minutes before breakfast       Social History:  Social History     Social History     Marital status:      Spouse name: N/A     Number of children: N/A     Years of education: N/A     Occupational History     Not on file.     Social History Main Topics     Smoking status: Former Smoker     Packs/day: 0.50     Years: 15.00     Types: Cigarettes     Quit date: 11/12/1998     Smokeless tobacco: Never Used      Comment: quit 15 yrs ago     Alcohol use No      Drug use: No     Sexual activity: Yes     Other Topics Concern     Caffeine Concern Yes     8 cups coffee day     Sleep Concern Yes     Weight Concern Yes     weight loss     Special Diet No     Exercise Yes     split [place] daily     Social History Narrative       Family History:  Family History   Problem Relation Age of Onset     CANCER Mother      CANCER Son      Unknown/Adopted Father      Alcoholism Brother        ROS:[AS1.1]  Negative[AS1.3]     Exam:  Temp:  [97.6  F (36.4  C)-97.9  F (36.6  C)] 97.6  F (36.4  C)  Heart Rate:  [68-71] 70  Resp:  [12-19] 12  BP: (128-163)/(72-92) 128/72  MAP:  [95 mmHg-97 mmHg] 97 mmHg  Arterial Line BP: (136-139)/(68) 139/68  SpO2:  [96 %-100 %] 96 %  General: Alert, interactive, NAD  Eyes: sclera anicteric, EOMI  Resp: clear to auscultation bilaterally, no crackles or wheezes  Cardiovascular: regular rate and rhythm, no murmur  GI: Soft, nontender, nondistended. +BS.  No HSM or masses, no rebound or guarding.  :[AS1.1] Parrish[AS1.4]   MSK:[AS1.1] Generalized weakness[AS1.4]  Skin: Warm and dry, no jaundice or rash  Neuro: Alert & oriented x 3, Cns 2-12 intact, moves all extremities equally[AS1.1], Extremely [place][AS1.4]  Psych:[AS1.1] Approrpiate[AS1.4]    Data:  CMP  Recent Labs  Lab 09/27/17  1500 09/27/17  1231 09/27/17  0923 09/25/17  1622    137 138 135   POTASSIUM 4.0 4.0 4.7 4.5   CHLORIDE 108  --  105 103   CO2 22  --  26 26   ANIONGAP 8  --  7 7   * 107* 109* 111*   BUN 11  --  12 15   CR 0.82  --  1.01 1.00   GFRESTIMATED 88  --  69 70   GFRESTBLACK >90  --  84 85   SHELIA 7.7*  --  9.0 8.8   MAG 1.8  --   --   --    PHOS 3.2  --   --   --    PROTTOTAL 5.4*  --  6.7* 6.8   ALBUMIN 2.5*  --  3.1* 3.1*   BILITOTAL 0.2  --  0.4 0.5   ALKPHOS 68  --  86 83   AST 30  --  32 27   ALT 18  --  21 21     CBC  Recent Labs  Lab 09/27/17  1500 09/27/17  1231 09/27/17  0923 09/25/17  1622   WBC 3.4*  --  4.3 4.1   RBC 2.98*  --  3.59* 3.35*   HGB 9.0* 9.3* 10.6*  10.2*   HCT 28.1*  --  33.7* 31.9*   MCV 94  --  94 95   MCH 30.2  --  29.5 30.4   MCHC 32.0  --  31.5 32.0   RDW 15.8*  --  15.9* 15.6*     --  256 249       No results found for: TROPI, TROPONIN, TROPR, TROPN      EKG:[AS1.1] Paced with rates 70's-80's, narrow QRS[AS1.4]       SUMMARY:  1. Access was obtained in the right and left common femoral arteries and the left common femoral vein by the interventional cardiology team.  The arterial site used for valve delivery was pre-closed with two Perclose Proglide devices.   2. A 5Fr PACEL temporary pacemaker was positioned in the right ventricle and tested for adequate capture.    3. Iliofemoral angiography was not done since the right CFA was big and without evidence of disease on ultrasound.   A Lunderquist wire was used to support the Harman eSheath insertion.    4. The 6Fr pigtail catheter was positioned in the right-coronary cusp and angiography was used to confirm the optimal implant angle.  The pigtail was then moved to the non-coronary cusp.    5. Access into the LV was obtained using an AL-2 catheter and a straight wire.  The AL-2 catheter was used to exchange the straight wire for a J-wire and a pigtail catheter was then positioned in the left ventricle.  The LVEDP was measured with a pressure of 15 mmHg.  The pigtail catheter was used to advance a Lunderquist wire into in the LV and the pigtail was removed.    6. Aortic valve balloon valvuloplasty was successfully completed using an Harman 25mm balloon during rapid pacing and resulted in no hemodynamic compromise.  The 29mm Harman Ramsey 3 prosthetic valve was then positioned across the native aortic valve and was attempted to be deployed under rapid pacing.  However the balloon inside the valve stent never inflated due to perforation of the balloon while the balloon was loaded up within the valve stent in the descending aorta.  The whole system including the delivery system and the 16Fr Harman  sheath were removed along with the sheath to ensure that the whole system came out without harming the sheath due to bulky valve.  The Lunderquist wire was left in place in the LV and another 16Fr Harman sheath was inserted in the R CFA.  A 2nd #29 S3 valve was inserted and loaded onto the balloon more distally where the descending aorta was more vertical.  The valve was crossed with the system and the valve was deployed under rapid pacing of 160bpm with a depth of 80/20 with nominal pressure (33cc).   7. Subsequent transesophageal and transthoracic echocardiography and an ascending aortogram showed trace paravalvular insufficiency.    8. The valve sheath access arteriotomy was successfully closed with the double suture closure devices with successful hemostasis.    9. A final iliofemoral angiogram showed no evidence of extravasation or stenosis.   10. The LCFA 6Fr arteriotomy was successfully closed with a 6Fr AngioSeal closure device.  11. The temporary pacemaker was then removed.           NOTABLE EVENTS:  1. Valve deployment failure due to suspected perforation of balloon likely during loading of balloon unto valve stent and requiring removal of whole delivery system along with valve and sheath.     COMPLICATIONS:  1. None     SUMMARY:    1. Lifestyle-limiting severe aortic stenosis.  2. Successful transfemoral transcatheter aortic valve replacement with a 29mm Harman Ramsey 3 valve.  3. Temporary pacemaker insertion.  4. Aortic balloon valvuloplasty with a 25mm balloon.  5. Left heart catheterization with LVEDP of 15 mmHg.  6. Right and left common femoral arteriotomies successfully closed with closure devices.     PLAN:    1. Change Aspirin to 81 mg po daily lifelong.  2. Restart home apixaban dose 2.5mg bid  3. Bedrest per protocol (6 hours).  4. Admit to the primary inpatient team for further evaluation and management.  5. Echocardiogram tomorrow.   6. Lifelong antibiotic prophylaxis prior to all dental  procedures.     Assessment/ Plan: Bud Merritt is a[AS1.1] 89 year old male with PMHx pertinent for Afib on apixiban,[AS1.2] s/p AV lisandro ablation with BiV[AS1.3] PPM[AS1.2],[AS1.3] (3/2017) COPD, prostate cancer, RA and chronic pain[AS1.2] and severe aortic stenosis severe characterized by an area of 0.9 cm2, mean gradient 36 mmHg and peak velocity of 3.78 m/sec with LVEF 20-25% by echocardiogram on 3/2017. Patient presents to [facility] today for transcatheter aortic valve replacement.[AS1.3]     # Severe aortic stenosis, now s/p 29 mm Harman Ramsey 3 Transcatheter Aortic Valve Replacement via RCFA.[AS1.1]   # HF EF 20-25%  # NICM[AS1.4]     Prior to procedure, pt noted to have a mean gradient[AS1.1] 36[AS1.4] mmHG, TYREL[AS1.1] 0.9[AS1.4] cm^2- Sheath sites soft without hematoma. No arrhythmias.   - Bedrest per protocol.    - Neuro checks, per protocol.  - Echocardiogram tomorrow.   - Monitor groin sites.   - EKG in morning.   - Parrish can be removed once patient is out of bed.   -[AS1.1] Apixiban a[AS1.4]nd ASA for anti-platelet therapy.   - Cardiac rehab/PT/OT.  - Diurese as needed/able.   - Daily weights.   - Strict intake/output.     # Atrial Fibrillation[AS1.1], s/p ablation with BiV PPM  - Apixa and asa as above[AS1.4]    #[AS1.1]  RA and chronic pain[AS1.2]  - Norco PRN    #[AS1.4]Hypertension  - Restart home anti-htn once blood pressure and heart rate are stable, likely tomorrow.     FEN: Advance as tolerated, cardiac, diabetic diet  PPx: DVT:[AS1.1] ambulation[AS1.4]   Lines:[AS1.1] PIV[AS1.4]  Code status: Full     POLLO Sims  Interventional Cardiology-CSI  Pager[AS1.1] [phone][AS1.4]       Revision History        User Key Date/Time User Provider Type Action    > AS1.4 9/28/2017 12:51 PM Ludy Emerson APRN CNP Nurse Practitioner Sign     AS1.3 9/27/2017  4:05 PM Ludy Emerson, POLLO CNP Nurse Practitioner      AS1.2 9/27/2017  3:57 PM Ludy Emerson, POLLO CNP Nurse Practitioner      AS1.1  9/27/2017  3:53 PM Ludy Emerson APRN CNP Nurse Practitioner             H&P signed by Pam Provider at 9/28/2017 10:45 AM      Author:  Jeffy Orozco Service:  (none) Author Type:  Physician    Filed:  9/28/2017 10:45 AM Date of Service:  9/28/2017  8:02 AM Creation Time:  9/28/2017 10:45 AM    Status:  Signed :  Pam Provider (Physician)     Scan on 9/28/2017 10:45 AM by Pam, Provider : [facility], H/P, 09/25/17 1          Revision History        User Key Date/Time User Provider Type Action    > [N/A] 9/28/2017 10:45 AM Pam, Provider Physician Sign            H&P by Nicole Santiago PA-C at 9/25/2017  1:30 PM     Author:  Nicole Santiago PA-C Service:  (none) Author Type:  Physician Assistant    Filed:  9/25/2017  6:45 PM Encounter Date:  9/25/2017 Status:  Signed    :  Nicole Santiago PA-C (Physician Assistant)             Pre-Operative H & P     CC:  Preoperative exam to assess for increased cardiopulmonary risk while undergoing surgery and anesthesia.    Date of Encounter: September 25, 2017   Primary Care Physician:  Camron Aragon  Bud Merritt is a 89 year old male who is scheduled for a Transfemoral Approach (Harman) Aortic Valve Implant, Cardiopulmonary Bypass Standby on 9/27/17 with Dr. Janes Kingsley for severe aortic stenosis at the [facility].     Mr. Merritt has severe aortic stenosis.  His other significant cardiac issues include AFIB, s/p cardiac pacemaker (dependent) 3/2017 for cardiomyopathy and CHF CLASS III.  He has been diagnosed with COPD, but does not use any inhalers.  According to [facility] records he was inpatient March 2017 including a COPD exacerbation.  He has RA for which he takes Prednisone 5 mg daily and Arava.  His other medical history includes prostate cancer and chronic pain.  He is severely limited in activity due to RA and has increased fatigue.  He  ambulates with a cane and uses a wheel chair.  He cannot lie flat due to arthritis.  He also has a right lower extremity leg wound that has dehisced and is weeping.  This was evaluated by his PCP and determined to be healing and not a reason to cancel surgery.        History is obtained from the patient and medical record including Care Everywhere.    Past Medical History  Past Medical History:   Diagnosis Date     Anemia      Atrial fibrillation (H) 07/01/2016     Cardiac pacemaker 03/10/2017     Cardiomyopathy (H)      CHF (congestive heart failure) (H)      COPD exacerbation (H) 3/2/2017     Depressive disorder      History of prostate cancer      Paroxysmal atrial fibrillation (H) 7/6/2016     Pure hypercholesterolemia      Rheumatoid arthritis(714.0)      Unspecified essential hypertension      Weakness generalized 3/2/2017         Past Surgical History  Past Surgical History:   Procedure Laterality Date     ARTHROPLASTY KNEE Left 1/12/2016    Procedure: ARTHROPLASTY KNEE;  Surgeon: Cesar Walker DO;  Location: [facility] OR     COLONOSCOPY  08/24/09     HC COLONOSCOPY THRU STOMA W BIOPSY/CAUTERY TUMOR/POLYP/LESION  8/31/2004     HC REPAIR ING HERNIA,5+Y/O,REDUCIBL  1996    Marlex mesh repair of bilateral inguinal hernias and drainage of bilateral scrotal hydroceles.     IMPLANT PACEMAKER  03/10/2017     IRRIGATION AND DEBRIDEMENT SOFT TISSUE LOWER EXTREMITY, COMBINED Left 3/15/2016    Procedure: COMBINED IRRIGATION AND DEBRIDEMENT SOFT TISSUE LOWER EXTREMITY;  Surgeon: Cesar Walker DO;  Location: [facility] OR       Hx of Blood transfusions/reactions: No reactions     Prior to Admission Medications  Current Outpatient Prescriptions   Medication Sig Dispense Refill     HYDROcodone-acetaminophen (NORCO) 5-325 MG per tablet TAKE 1 TABLET BY MOUTH EVERY 6 HOURS AS NEEDED FOR MODERATE TO SEVERE PAIN 30 tablet 0     traMADol (ULTRAM) 50 MG tablet TAKE 1 TABLET 3 TIMES DAILY AS NEEDED FOR MODERATE PAIN 90  tablet 0     leflunomide (ARAVA) 10 MG tablet Take 1 tablet (10 mg) by mouth daily 30 tablet 11     dofetilide (TIKOSYN) 125 MCG capsule Take 1 capsule (125 mcg) by mouth 2 times daily 60 capsule 5     metoprolol (LOPRESSOR) 50 MG tablet TAKE 1 TABLET (50 MG) BY MOUTH 2 TIMES DAILY 120 tablet 3     atorvastatin (LIPITOR) 40 MG tablet Take 1 tablet (40 mg) by mouth daily (Patient taking differently: Take 40 mg by mouth every evening ) 90 tablet 2     predniSONE (DELTASONE) 5 MG tablet Take 1 tablet (5 mg) by mouth daily 100 tablet 4     apixaban ANTICOAGULANT (ELIQUIS) 2.5 MG tablet Take 1 tablet (2.5 mg) by mouth 2 times daily (Patient not taking: Reported on 9/25/2017) 180 tablet 3     Leuprolide Acetate (LUPRON DEPOT IM) Inject into the muscle every 6 months Reported on 5/3/2017       lisinopril (PRINIVIL,ZESTRIL) 10 MG tablet Take 1 tablet (10 mg) by mouth 2 times daily 62 tablet 11     spironolactone (ALDACTONE) 25 MG tablet Take 1 tablet (25 mg) by mouth daily 30 tablet 11     acetaminophen (TYLENOL) 650 MG CR tablet Take 650 mg by mouth every 8 hours as needed Reported on 4/5/2017       alendronate (FOSAMAX) 70 MG tablet Take 1 tablet (70 mg) by mouth every 7 days Take with over 8 ounces water and stay upright for at least 30 minutes after dose.  Take at least 60 minutes before breakfast 12 tablet 3     tamsulosin (FLOMAX) 0.4 MG 24 hr capsule Take 1 capsule (0.4 mg) by mouth 2 times daily 60 capsule      ascorbic acid (VITAMIN C) 500 MG CPCR Take 500 mg by mouth daily       VITAMIN D, CHOLECALCIFEROL, PO Take 1 tablet by mouth daily Reported on 5/3/2017       calcium carbonate (OS-SHELIA 500 MG [place]. [place]) 500 MG tablet Take 1 tablet by mouth daily            Allergies  Amiodarone and Lasix [furosemide]     Social History  Social History     Social History     Marital status:      Spouse name: N/A     Number of children: N/A     Years of education: N/A     Occupational History     Not on file.     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  All pertinent positives noted, otherwise Negative.      Cardiac Testing    >ECHO 3/6/17  Moderate left ventricular dilation is present.  The Ejection Fraction is estimated at 20-25%.  Normal contraction of basal segments with akinetic mid and distal segments. This wall motion abnormality is new since prior study on 12/13/2016.  Consistent with stress cardiomyopathy, if no LAD disease.  Severe aortic stenosis is present.  The aortic valve area is 0.9 cm^2, peak aortic velocity is 3.8 m/sec, mean gradient across the aortic valve is 37 mmHg.  >Stress Test    >CATH 7/1/16:  1. Scattered mild to moderate disease throughout all coronaries. Tightest stenosis appears to be 40-50% RCA stenosis. Right dominant circulation. L Main is 40% 2. Aortic valve was not crossed. 3. Patient with rapid atrial fibrillation with minimal symptoms.    Labs: (personally reviewed):[CS1.1]  Lab Results   Component Value Date    WBC 4.1 09/25/2017    HGB 10.2 (L) 09/25/2017    HCT 31.9 (L) 09/25/2017     09/25/2017    INR 1.08 06/30/2017    PTT 32 03/27/2017     09/25/2017    POTASSIUM 4.5 09/25/2017    SHELIA 8.8 09/25/2017     (H) 09/25/2017    CR 1.00 09/25/2017    BUN 15 09/25/2017    CO2 26 09/25/2017    ALT 21 09/25/2017    AST 27  "09/25/2017    BILITOTAL 0.5 09/25/2017    ALKPHOS 83 09/25/2017[CS1.2]           Physical Exam:  No LMP for male patient.   Vital signs:  /88  Pulse 70  Temp 97.6  F (36.4  C) (Oral)  Resp 16  Ht 1.676 m (5' 6\")  Wt 68 kg (150 lb)  SpO2 95%  BMI 24.21 kg/m2    Constitutional: Awake, alert, cooperative, no apparent distress, and appears stated age.  Eyes: Pupils equal, round and reactive to light, sclera clear, conjunctiva normal.  HENT: Normocephalic, oral pharynx with moist mucus membranes. No goiter appreciated.   Respiratory: Clear to auscultation bilaterally, no crackles or wheezing.  Cardiovascular: Regular rate and rhythm, with overt murmur noted. Moderate LE edema. Palpable pulses to radial  DP and PT arteries.   GI: Normal bowel sounds, soft, non-distended, non-tender, no masses palpated  Skin: dehisced wound right LE without erythema or edema.  No rashes at anticipated surgical site.   Musculoskeletal: Limited ROM of neck. There is no redness, warmth, or swelling of the exposed joints. Gross motor strength is mildly decreased.    Neurologic: Awake, oriented to name, place and time.  Ambulates with cane   Neuropsychiatric: Calm, cooperative. Normal affect.     Assessment/Plan  Bud Merritt is a 89 year old male who is scheduled for a Transfemoral Approach (Harman) Aortic Valve Implant, Cardiopulmonary Bypass Standby on 9/27/17 with Dr. Janes Kingsley for severe aortic stenosis at the [facility]. PAC referral for risk assessment and optimization for anesthesia with comorbid conditions of:    Pre-operative considerations:  1.  Cardiac:  Functional status METS  = 1, Risk of Major Adverse Cardiac event: >11%  -Cardiac pacemaker implanted 3/10/17 for cardiomyopathy,pacemaker dependent, last interrogation   -AFIB, s/p ablation 6/29/17  -Severe AS   - CHF NYHA CLASS: II-III    >ECHO 3/6/17  Moderate left ventricular dilation is present.  The Ejection Fraction " is estimated at 20-25%.  Normal contraction of basal segments with akinetic mid and distal segments. This wall motion abnormality is new since prior study on 12/13/2016.  Consistent with stress cardiomyopathy, if no LAD disease.  Severe aortic stenosis is present.  The aortic valve area is 0.9 cm^2, peak aortic velocity is 3.8 m/sec, mean gradient across the aortic valve is 37 mmHg.      >CATH 7/1/16:  1. Scattered mild to moderate disease throughout all coronaries. Tightest stenosis appears to be 40-50% RCA stenosis. Right dominant circulation. L Main is 40% 2. Aortic valve was not crossed. 3. Patient with rapid atrial fibrillation with minimal symptoms.  -Patient on  Eliquist and will stop 9/25/17  2.  Pulm:   KATHERIN risk: Intermediate  -COPD with exacerbation noted [facility] 3/2017, patient does not use inhalers   >PFT date: 9/16/16:  FEV1-%Pred-Pr = 69, FEV1FVC-Pred %= 74, DLCO-%Pred-Pre = 51   3. MS  -RA on chronic prednisone 5 mg  4.  GI:  Risk of PONV score =1 .  If > 2, anti-emetic intervention recommended.    Patient is optimized and is acceptable candidate for the proposed procedure.  No further diagnostic evaluation is needed.     Nicole Santiago MS, PA-C   Preoperative Assessment Center  [facility]  Clinic and Surgery Center  Phone: [phone]  Fax: [phone][CS1.1]       Revision History        User Key Date/Time User Provider Type Action    > CS1.2 9/25/2017  6:45 PM Nicole Santiago PA-C Physician Assistant Sign     CS1.1 9/25/2017  6:36 PM Nicole Santiago PA-C Physician Assistant                   Discharge Summaries     No notes of this type exist for this encounter.         Consult Notes      Consults by Masha Beasley at 9/28/2017  4:06 PM     Author:  Masha Beasley Service:  Social Work Author Type:      Filed:  9/28/2017  4:06 PM Date of Service:  9/28/2017  4:06 PM Creation Time:  9/28/2017  4:01 PM    Status:  Signed :  Ramos  Masha ()     Consult Orders:    1. Social Work IP Consult [[id number]] ordered by Filipe Allen MD at 09/27/17 1421                Social Work Services Progress Note    Hospital Day: 2  Date of Initial Social Work Evaluation:  9/25/17 in clinic pre-surgery  Collaborated with:  Team rounds, chart review    Data:    Pt was admitted s/p TAVR procedure.   consult received for d/c planning.    Intervention:    Chart reviewed.  Noted in SW assessment and today's OT note that pt prefers to d/c to home.  OT recommends TCU at this time, but if pt goes home they recommend home PT/OT/RN.  Attempted to meet with pt to assess willingness to consider TCU and provide education, but he was sound asleep and no family was present.  Unable to return or call family d/t scheduling conflicts.    Assessment:   Unable to assess today.    Plan:    Anticipated Disposition:  TCU vs. Home pending discussion with pt.    Barriers to d/c plan:  Pt is not medically stable.    Follow Up:  SW continues to follow for support to pt and family, resource referral, and d/c planning.    KIRAN Francis, [facility]  Adult ICU Clinical   Pager [phone][HS1.1]           Revision History        User Key Date/Time User Provider Type Action    > HS1.1 9/28/2017  4:06 PM Masha Beasley  Sign                  Progress Notes - Physician (Notes for yesterday and today)     No notes of this type exist for this encounter.         Procedure Notes      Procedures by Filipe Allen MD at 9/27/2017  2:22 PM     Author:  Filipe Allen MD Service:  Cardiology Author Type:  Fellow    Filed:  9/27/2017  2:46 PM Date of Service:  9/27/2017  2:22 PM Creation Time:  9/27/2017  2:22 PM    Status:  Cosign Needed :  Filipe Allen MD (Fellow)    Cosign Required:  Yes         Pre-procedure Diagnoses:    1. Severe aortic stenosis [I35.0]           Procedures:    1. HC TAVR - TRANSFEMORAL APPROACH [0256T ([facility])]                TAVR PROCEDURE REPORT:     PROCEDURES PERFORMED:   1. Temporary pacemaker insertion.  2. Iliofemoral artery angiography.  3. Ascending aorta angiography.  4. Left heart catheterization.  5. Aortic valve balloon valvuloplasty with a 25mm balloon.  6. Successful transfemoral transcatheter aortic valve replacement with a 29mm Harman Ramsey 3 valve.  7. Arteriotomy closure.    PHYSICIANS:  1. Attending Interventional Cardiology Staff: Hermelindo Gunn MD and Fan Lozano MD  2. Attending Cardiovascular Surgery Staff: Sathish Alejandra MD  3. Structural Interventional Cardiology Fellow: Filipe Allen    INDICATION:  Bud Merritt is a 89 year old male with severe symptomatic aortic stenosis who presents on an elective outpatient basis for transfemoral transcatheter aortic valve replacement with plan for an Harman Ramsey 3 valve.    DESCRIPTION:  1. Consent obtained with discussion of risks.  All questions were answered.  2. Sterile prep and procedure per OR protocol.  3. Location: right and left common femoral arteries and left common femoral vein.  4. Access: Local anesthetic with lidocaine.  A standard (18 g) needle with ultrasound guidance was used to establish vascular access using a modified Seldinger technique.  5. Sheath:  16Fr Harman eSheath in the RCFA, 6Fr long sheath in the LCFA,  6Fr long sheath in the LCFV  6. Catheters: 6Fr angled pigtail, 6Fr AL-1 and 6Fr AL-  7. Estimated blood loss of 30 mL.  8. See below for procedure details.    MEDICATIONS:  1. Sedation per anesthesia.  2. Heparin administered to achieve a goal ACT > 300 sec per anesthesia.   3. Protamine IV.    SUMMARY:  1. Access was obtained in the right and left common femoral arteries and the left common femoral vein by the interventional cardiology team.  The arterial site used for valve delivery was pre-closed with two Perclose Proglide devices.   2. A 5Fr PACEL temporary pacemaker was positioned in the right ventricle and  tested for adequate capture.    3. Iliofemoral angiography was not done since the right CFA was big and without evidence of disease on ultrasound.   A Lunderquist wire was used to support the Harman eSheath insertion.    4. The 6Fr pigtail catheter was positioned in the right-coronary cusp and angiography was used to confirm the optimal implant angle.  The pigtail was then moved to the non-coronary cusp.    5. Access into the LV was obtained using an AL-2 catheter and a straight wire.  The AL-2 catheter was used to exchange the straight wire for a J-wire and a pigtail catheter was then positioned in the left ventricle.  The LVEDP was measured with a pressure of 15 mmHg.  The pigtail catheter was used to advance a Lunderquist wire into in the LV and the pigtail was removed.    6. Aortic valve balloon valvuloplasty was successfully completed using an Harman 25mm balloon during rapid pacing and resulted in no hemodynamic compromise.  The 29mm Harman Ramsey 3 prosthetic valve was then positioned across the native aortic valve and was attempted to be deployed under rapid pacing.  However the balloon inside the valve stent never inflated due to perforation of the balloon while the balloon was loaded up within the valve stent in the descending aorta.  The whole system[GF1.1] including the delivery system and the 16Fr Harman sheath were[GF1.2] removed along with the sheath to ensure that[GF1.1] the whole system came out without harming the sheath due to bulky valve.  The Lunderquist wire was left in place in the LV and another 16Fr Harman sheath was inserted in the R CFA.  A 2nd #29 S3 valve was inserted and loaded onto the balloon more distally where the descending aorta was more vertical.  The valve was crossed with the system and the valve was deployed under rapid pacing of 160bpm with a depth of 80/20 with nominal pressure (33cc).   7. Subsequent transesophageal and transthoracic echocardiography and an ascending  aortogram showed trace paravalvular insufficiency.    8. The valve sheath access arteriotomy was successfully closed with the double suture closure devices with successful hemostasis.    9. A final iliofemoral angiogram showed no evidence of extravasation or stenosis.   10. The LCFA 6Fr arteriotomy was successfully closed with a 6Fr AngioSeal closure device.  11. The temporary pacemaker was then removed.        NOTABLE EVENTS:  1. Valve deployment failure due to suspected perforation of balloon likely during loading of balloon unto valve stent and requiring removal of whole delivery system along with valve and sheath.    COMPLICATIONS:  1. None    SUMMARY:    1. Lifestyle-limiting severe aortic stenosis.  2. Successful transfemoral transcatheter aortic valve replacement with a 29mm Harman Ramsey 3 valve.  3. Temporary pacemaker insertion.  4. Aortic balloon valvuloplasty with a 25mm balloon.  5. Left heart catheterization with LVEDP of 15[GF1.2] mmHg.  6. Right and left common femoral arteriotomies successfully closed with closure devices.    PLAN:    1. Change Aspirin to 81 mg po daily lifelong.  2. Restart home apixaban dose 2.5mg bid  3. Bedrest per protocol[GF1.1] (6 hours)[GF1.2].  4. Admit to the primary inpatient team for further evaluation and management.  5. Echocardiogram tomorrow.   6. Lifelong antibiotic prophylaxis prior to all dental procedures.      The attending interventional cardiologists,Noel Gunn and Blake, were present and participated in the entire procedure.      Filipe Allen  Structural Heart Disease &   Advanced Interventional Cardiology Fellow  [phone][GF1.1]     Revision History        User Key Date/Time User Provider Type Action    > GF1.2 9/27/2017  2:46 PM Filipe Allen MD Fellow Sign     GF1.1 9/27/2017  2:22 PM Filipe Allen MD Fellow                      Progress Notes - Therapies (Notes from 09/27/17 through 09/30/17)      Progress Notes by Jian Anguiano,  OT at 9/28/2017  1:58 PM     Author:  Jian Anguiano OT Service:  Acute IP Rehab Author Type:  Occupational Therapist    Filed:  9/28/2017  1:59 PM Date of Service:  9/28/2017  1:58 PM Creation Time:  9/28/2017  1:58 PM    Status:  Signed :  Jian Anguiano OT (Occupational Therapist)          09/28/17 1321   Quick Adds   Type of Visit Initial Occupational Therapy Evaluation   Living Environment   Lives With spouse   Living Arrangements house   Home Accessibility no concerns   Number of Stairs to Enter Home 0   Number of Stairs Within Home 0   Transportation Available family or friend will provide;car   Living Environment Comment spouse is home to A prn   Self-Care   Dominant Hand right   Usual Activity Tolerance moderate   Current Activity Tolerance fair   Regular Exercise no   Equipment Currently Used at Home cane, quad;wheelchair, manual;walker, rolling;shower chair;raised toilet;commode   Functional Level Prior   Ambulation 1-->assistive equipment  (walker or cane)   Transferring 1-->assistive equipment   Toileting 1-->assistive equipment   Bathing 3-->assistive equipment and person   Dressing 2-->assistive person   Eating 0-->independent   Communication 0-->understands/communicates without difficulty   Swallowing 0-->swallows foods/liquids without difficulty   Cognition 0 - no cognition issues reported   Fall history within last six months yes   Number of times patient has fallen within last six months 1   Which of the above functional risks had a recent onset or change? fall history   General Information   Onset of Illness/Injury or Date of Surgery - Date 09/27/17   Referring Physician Filipe Allen MD   Patient/Family Goals Statement To get back home and be as I as possible   Additional Occupational Profile Info/Pertinent History of Current Problem Bud Merritt is a 89 year old male with PMHx pertinent for Afib on apixiban, s/p AV lisandro ablation with BiV PPM, (3/2017) COPD, prostate cancer,  RA and chronic pain and severe aortic stenosis severe characterized by an area of 0.9 cm2, mean gradient 36 mmHg and peak velocity of 3.78 m/sec with LVEF 20-25% by echocardiogram on 3/2017. Patient presents to [facility] today for transcatheter aortic valve replacement.    Precautions/Limitations fall precautions   Cognitive Status Examination   Orientation orientation to person, place and time   Level of Consciousness alert   Visual Perception   Visual Perception Wears glasses;No deficits were identified   Sensory Examination   Sensory Comments Pt denies deficits   Pain Assessment   Patient Currently in Pain No   Integumentary/Edema   Integumentary/Edema no deficits were identifed   Range of Motion (ROM)   ROM Comment L shoulder ~60 deg, R ~45, UEs limited by RA- baseline   Strength   Strength Comments WFL, generalized weakness   Muscle Tone Assessment   Muscle Tone Comments WNL   Coordination   Upper Extremity Coordination No deficits were identified   Transfer Skill: Sit to Stand   Level of [place]: Sit/Stand minimum assist (75% patients effort)   Upper Body Dressing   Level of [place]: Dress Upper Body minimum assist (75% patients effort)   Lower Body Dressing   Level of [place]wing: Dress Lower Body maximum assist (25% patients effort)   Toileting   Level of [place]wing: Toilet maximum assist (25% patients effort)   Grooming   Level of Ewing: Grooming stand-by assist   Instrumental Activities of Daily Living (IADL)   Previous Responsibilities (family does IADLs)   Activities of Daily Living Analysis   Impairments Contributing to Impaired Activities of Daily Living ROM decreased;flexibility decreased;strength decreased   General Therapy Interventions   Planned Therapy Interventions ADL retraining;home program guidelines;progressive activity/exercise   Clinical Impression   Criteria for Skilled Therapeutic Interventions Met yes, treatment indicated   OT Diagnosis decreased ADL I and tolerance  "  Influenced by the following impairments RA, weakness, fatigue   Assessment of Occupational Performance 3-5 Performance Deficits   Identified Performance Deficits home mgmt, leisure, bathing, dressing   Clinical Decision Making (Complexity) Low complexity   Therapy Frequency daily   Predicted Duration of Therapy Intervention (days/wks) 10/5/17   Anticipated Equipment Needs at Discharge (none)   Anticipated Discharge Disposition (TCU vs home with home therapy)   Risks and Benefits of Treatment have been explained. Yes   Patient, Family & other staff in agreement with plan of care Yes   Clinical Impression Comments Pt would benefit from skilled OT to help increase ADL I and tolerance   [facility] AM-PAC TM \"6 Clicks\"   2016, Trustees of [facility], under license to [employer].  All rights reserved.   6 Clicks Short Forms Daily Activity Inpatient Short Form   [facility] AM-PAC  \"6 Clicks\" Daily Activity Inpatient Short Form   1. Putting on and taking off regular lower body clothing? 2 - A Lot   2. Bathing (including washing, rinsing, drying)? 2 - A Lot   3. Toileting, which includes using toilet, bedpan or urinal? 2 - A Lot   4. Putting on and taking off regular upper body clothing? 3 - A Little   5. Taking care of personal grooming such as brushing teeth? 4 - None   6. Eating meals? 4 - None   Daily Activity Raw Score (Score out of 24.Lower scores equate to lower levels of function) 17   Total Evaluation Time   Total Evaluation Time (Minutes) 10[CK1.1]        Revision History        User Key Date/Time User Provider Type Action    > CK1.1 9/28/2017  1:59 PM Jian Anguiano OT Occupational Therapist Sign                                                      INTERAGENCY TRANSFER FORM - LAB / IMAGING / EKG / EMG RESULTS   9/27/2017                       [address] [facility] BANK: [phone]            Unresulted Labs (24h ago through future)    Start       Ordered    09/30/17 0400  CBC with " "platelets differential  DAILY,   Routine     Comments:  Last Lab Result: Hemoglobin (g/dL)       Date                     Value                 09/28/2017               8.2 (L)          ----------    09/29/17 0525 09/30/17 0400  Comprehensive metabolic panel  DAILY,   Routine      09/29/17 0525 09/30/17 0400  Magnesium  DAILY,   Routine      09/29/17 0525 09/30/17 0400  Phosphorus  DAILY,   Routine      09/29/17 0525    Unscheduled  Potassium  (Potassium Replacement - \"High\" - Replacement for all levels less than 4.1 mmol/L - UU,UR,UA,RH,SH,PH,WY )  CONDITIONAL (SPECIFY),   Routine     Comments:  Obtain Potassium Level for these conditions:  *IF no potassium result within 24 hrs before initiation of order set, draw potassium level with next lab collect.    *2 HOURS AFTER last IV potassium replacement dose and 4 hours after an oral replacement dose when potassium replacement given for level less than 3.4.  *Next morning after potassium dose.     Repeat Potassium Replacement if necessary.    09/28/17 0447    Unscheduled  Magnesium  (Magnesium Replacement - Adult - \"High\" - Replacement for all levels less than or equal to 2 mg/dL)  CONDITIONAL (SPECIFY),   Routine     Comments:  Obtain Magnesium Level for these conditions:  *IF no magnesium result within 24 hrs before initiation of order set, draw magnesium level with next lab collect.    *2 HOURS AFTER last magnesium replacement dose when magnesium replacement given for level less than 1.6  *Next morning after magnesium dose.     Repeat Magnesium Replacement if necessary.    09/28/17 0447         Lab Results - 3 Days      Magnesium [[id number]]  Resulted: 09/30/17 0735, Result status: Final result    Ordering provider: Cosme Gunn MD  09/29/17 2200 Resulting lab: [facility]    Specimen Information    Type Source Collected On   Blood  09/30/17 0610          Components       Value Reference Range Flag Lab   Magnesium " 2.0 1.6 - 2.3 mg/dL  51            Phosphorus [[id number]]  Resulted: 09/30/17 0735, Result status: Final result    Ordering provider: Cosme Gunn MD  09/29/17 2200 Resulting lab: [facility]    Specimen Information    Type Source Collected On   Blood  09/30/17 0610          Components       Value Reference Range Flag Lab   Phosphorus 2.6 2.5 - 4.5 mg/dL  51            Comprehensive metabolic panel [[id number]] (Abnormal)  Resulted: 09/30/17 0735, Result status: Final result    Ordering provider: Cosme Gunn MD  09/29/17 2200 Resulting lab: [facility]    Specimen Information    Type Source Collected On   Blood  09/30/17 0610          Components       Value Reference Range Flag Lab   Sodium 139 133 - 144 mmol/L  51   Potassium 4.0 3.4 - 5.3 mmol/L  51   Chloride 110 94 - 109 mmol/L H 51   Carbon Dioxide 19 20 - 32 mmol/L L 51   Anion Gap 10 3 - 14 mmol/L  51   Glucose 66 70 - 99 mg/dL L 51   Urea Nitrogen 13 7 - 30 mg/dL  51   Creatinine 0.80 0.66 - 1.25 mg/dL  51   GFR Estimate >90 >60 mL/min/1.7m2  51   Comment:  Non  GFR Calc   GFR Estimate If Black >90 >60 mL/min/1.7m2  51   Comment:  African American GFR Calc   Calcium 7.5 8.5 - 10.1 mg/dL L 51   Bilirubin Total 0.5 0.2 - 1.3 mg/dL  51   Albumin 2.1 3.4 - 5.0 g/dL L 51   Protein Total 4.9 6.8 - 8.8 g/dL L 51   Alkaline Phosphatase 59 40 - 150 U/L  51   ALT 11 0 - 70 U/L  51   AST 29 0 - 45 U/L  51            CBC with platelets differential [[id number]] (Abnormal)  Resulted: 09/30/17 0710, Result status: Final result    Ordering provider: Cosme Gunn MD  09/29/17 2200 Resulting lab: [facility]    Specimen Information    Type Source Collected On   Blood  09/30/17 0610          Components       Value Reference Range Flag Lab   WBC 5.7 4.0 - 11.0 10e9/L  51   RBC Count 2.70 4.4 - 5.9 10e12/L L 51   Hemoglobin 8.2 13.3 - 17.7  g/dL L 51   Hematocrit 25.5 40.0 - 53.0 % L 51   MCV 94 78 - 100 fl  51   MCH 30.4 26.5 - 33.0 pg  51   MCHC 32.2 31.5 - 36.5 g/dL  51   RDW 16.2 10.0 - 15.0 % H 51   Platelet Count 142 150 - 450 10e9/L L 51   Diff Method Automated Method   51   % Neutrophils 62.4 %  51   % Lymphocytes 19.8 %  51   % Monocytes 16.3 %  51   % Eosinophils 0.9 %  51   % Basophils 0.4 %  51   % Immature Granulocytes 0.2 %  51   Nucleated RBCs 0 0 /100  51   Absolute Neutrophil 3.6 1.6 - 8.3 10e9/L  51   Absolute Lymphocytes 1.1 0.8 - 5.3 10e9/L  51   Absolute Monocytes 0.9 0.0 - 1.3 10e9/L  51   Absolute Eosinophils 0.1 0.0 - 0.7 10e9/L  51   Absolute Basophils 0.0 0.0 - 0.2 10e9/L  51   Abs Immature Granulocytes 0.0 0 - 0.4 10e9/L  51   Absolute Nucleated RBC 0.0   51            Urine Culture Aerobic Bacterial [[id number]]  Resulted: 09/29/17 0720, Result status: Final result    Ordering provider: Cosme Gunn MD  09/28/17 0020 Resulting lab: [facility]    Specimen Information    Type Source Collected On   Catheterized Urine  09/28/17 0020          Components       Value Reference Range Flag Lab   Specimen Description Catheterized Urine      Culture Micro No growth   225            CBC with platelets [[id number]] (Abnormal)  Resulted: 09/29/17 0714, Result status: Final result    Ordering provider: Lázaro Monaco MD  09/29/17 0646 Resulting lab: [facility]    Specimen Information    Type Source Collected On   Blood  09/29/17 0656          Components       Value Reference Range Flag Lab   WBC 6.3 4.0 - 11.0 10e9/L  51   RBC Count 2.82 4.4 - 5.9 10e12/L L 51   Hemoglobin 8.4 13.3 - 17.7 g/dL L 51   Hematocrit 26.2 40.0 - 53.0 % L 51   MCV 93 78 - 100 fl  51   MCH 29.8 26.5 - 33.0 pg  51   MCHC 32.1 31.5 - 36.5 g/dL  51   RDW 16.0 10.0 - 15.0 % H 51   Platelet Count 155 150 - 450 10e9/L  51            Comprehensive metabolic panel [[id number]] (Abnormal)  Resulted:  09/29/17 0547, Result status: Final result    Ordering provider: Filipe Allen MD  09/28/17 2200 Resulting lab: [facility]    Specimen Information    Type Source Collected On   Blood  09/29/17 0513          Components       Value Reference Range Flag Lab   Sodium 137 133 - 144 mmol/L  51   Potassium 3.9 3.4 - 5.3 mmol/L  51   Chloride 105 94 - 109 mmol/L  51   Carbon Dioxide 22 20 - 32 mmol/L  51   Anion Gap 10 3 - 14 mmol/L  51   Glucose 73 70 - 99 mg/dL  51   Urea Nitrogen 10 7 - 30 mg/dL  51   Creatinine 0.85 0.66 - 1.25 mg/dL  51   GFR Estimate 85 >60 mL/min/1.7m2  51   Comment:  Non  GFR Calc   GFR Estimate If Black >90 >60 mL/min/1.7m2  51   Comment:  African American GFR Calc   Calcium 8.2 8.5 - 10.1 mg/dL L 51   Bilirubin Total 0.5 0.2 - 1.3 mg/dL  51   Albumin 2.4 3.4 - 5.0 g/dL L 51   Protein Total 5.2 6.8 - 8.8 g/dL L 51   Alkaline Phosphatase 66 40 - 150 U/L  51   ALT 14 0 - 70 U/L  51   AST 37 0 - 45 U/L  51            Comprehensive metabolic panel [[id number]] (Abnormal)  Resulted: 09/28/17 0437, Result status: Final result    Ordering provider: Filipe Allen MD  09/27/17 2200 Resulting lab: [facility]    Specimen Information    Type Source Collected On   Blood  09/28/17 0335          Components       Value Reference Range Flag Lab   Sodium 140 133 - 144 mmol/L  51   Potassium 3.6 3.4 - 5.3 mmol/L  51   Chloride 109 94 - 109 mmol/L  51   Carbon Dioxide 21 20 - 32 mmol/L  51   Anion Gap 10 3 - 14 mmol/L  51   Glucose 77 70 - 99 mg/dL  51   Urea Nitrogen 11 7 - 30 mg/dL  51   Creatinine 0.82 0.66 - 1.25 mg/dL  51   GFR Estimate 88 >60 mL/min/1.7m2  51   Comment:  Non  GFR Calc   GFR Estimate If Black >90 >60 mL/min/1.7m2  51   Comment:  African American GFR Calc   Calcium 7.6 8.5 - 10.1 mg/dL L 51   Bilirubin Total 0.5 0.2 - 1.3 mg/dL  51   Albumin 2.3 3.4 - 5.0 g/dL L 51   Protein Total 4.8 6.8 -  8.8 g/dL L 51   Alkaline Phosphatase 58 40 - 150 U/L  51   ALT 15 0 - 70 U/L  51   AST 33 0 - 45 U/L  51            Magnesium [[id number]]  Resulted: 09/28/17 0437, Result status: Final result    Ordering provider: Filipe Allen MD  09/27/17 2200 Resulting lab: [facility]    Specimen Information    Type Source Collected On   Blood  09/28/17 0335          Components       Value Reference Range Flag Lab   Magnesium 1.6 1.6 - 2.3 mg/dL  51            Phosphorus [[id number]]  Resulted: 09/28/17 0437, Result status: Final result    Ordering provider: Filipe Allen MD  09/27/17 2200 Resulting lab: [facility]    Specimen Information    Type Source Collected On   Blood  09/28/17 0335          Components       Value Reference Range Flag Lab   Phosphorus 3.1 2.5 - 4.5 mg/dL  51            CBC with platelets differential [[id number]] (Abnormal)  Resulted: 09/28/17 0413, Result status: Final result    Ordering provider: Filipe Allen MD  09/27/17 2200 Resulting lab: [facility]    Specimen Information    Type Source Collected On   Blood  09/28/17 0335          Components       Value Reference Range Flag Lab   WBC 4.3 4.0 - 11.0 10e9/L  51   RBC Count 2.77 4.4 - 5.9 10e12/L L 51   Hemoglobin 8.2 13.3 - 17.7 g/dL L 51   Hematocrit 25.6 40.0 - 53.0 % L 51   MCV 92 78 - 100 fl  51   MCH 29.6 26.5 - 33.0 pg  51   MCHC 32.0 31.5 - 36.5 g/dL  51   RDW 15.9 10.0 - 15.0 % H 51   Platelet Count 184 150 - 450 10e9/L  51   Diff Method Automated Method   51   % Neutrophils 64.7 %  51   % Lymphocytes 17.1 %  51   % Monocytes 18.0 %  51   % Eosinophils 0.0 %  51   % Basophils 0.2 %  51   % Immature Granulocytes 0.0 %  51   Nucleated RBCs 0 0 /100  51   Absolute Neutrophil 2.8 1.6 - 8.3 10e9/L  51   Absolute Lymphocytes 0.7 0.8 - 5.3 10e9/L L 51   Absolute Monocytes 0.8 0.0 - 1.3 10e9/L  51   Absolute Eosinophils 0.0 0.0 - 0.7 10e9/L   51   Absolute Basophils 0.0 0.0 - 0.2 10e9/L  51   Abs Immature Granulocytes 0.0 0 - 0.4 10e9/L  51   Absolute Nucleated RBC 0.0   51            Procalcitonin [[id number]]  Resulted: 09/28/17 0123, Result status: Final result    Ordering provider: Kirill Ham MD  09/28/17 0008 Resulting lab: [facility]    Specimen Information    Type Source Collected On   Blood  09/28/17 0012          Components       Value Reference Range Flag Lab   Procalcitonin 0.05 ng/ml  51   Comment:         0.05-0.24 ng/ml Low risk of systemic bacterial infection. Local bacterial   infection possible.  Recommendation: Assess other clinical features of   infection. Discourage antibiotics unless strong clinical suspicion for serious   infection.              UA with Microscopic reflex to Culture [[id number]] (Abnormal)  Resulted: 09/28/17 0106, Result status: Final result    Ordering provider: Kirill Ham MD  09/28/17 0008 Resulting lab: [facility]    Specimen Information    Type Source Collected On   Catheterized Urine Urine catheter 09/28/17 0020          Components       Value Reference Range Flag Lab   Color Urine Light Yellow   51   Appearance Urine Clear   51   Glucose Urine Negative NEG^Negative mg/dL  51   Bilirubin Urine Negative NEG^Negative  51   Ketones Urine 10 NEG^Negative mg/dL A 51   Specific Gravity Urine 1.037 1.003 - 1.035 H 51   Blood Urine Trace NEG^Negative A 51   pH Urine 5.0 5.0 - 7.0 pH  51   Protein Albumin Urine Negative NEG^Negative mg/dL  51   Urobilinogen mg/dL Normal 0.0 - 2.0 mg/dL  51   Nitrite Urine Negative NEG^Negative  51   Leukocyte Esterase Urine Large NEG^Negative A 51   Source Catheterized Urine   51   WBC Urine 11 0 - 2 /HPF H 51   RBC Urine 4 0 - 2 /HPF H 51   Transitional Epi <1 0 - 1 /HPF  51   Mucous Urine Present NEG^Negative /LPF A 51            CBC with platelets differential [[id number]] (Abnormal)  Resulted:  09/28/17 0031, Result status: Final result    Ordering provider: Kirill Ham MD  09/28/17 0008 Resulting lab: [facility]    Specimen Information    Type Source Collected On   Blood  09/28/17 0012          Components       Value Reference Range Flag Lab   WBC 4.7 4.0 - 11.0 10e9/L  51   RBC Count 2.81 4.4 - 5.9 10e12/L L 51   Hemoglobin 8.4 13.3 - 17.7 g/dL L 51   Hematocrit 25.8 40.0 - 53.0 % L 51   MCV 92 78 - 100 fl  51   MCH 29.9 26.5 - 33.0 pg  51   MCHC 32.6 31.5 - 36.5 g/dL  51   RDW 16.0 10.0 - 15.0 % H 51   Platelet Count 182 150 - 450 10e9/L  51   Diff Method Automated Method   51   % Neutrophils 73.8 %  51   % Lymphocytes 10.7 %  51   % Monocytes 14.9 %  51   % Eosinophils 0.0 %  51   % Basophils 0.4 %  51   % Immature Granulocytes 0.2 %  51   Nucleated RBCs 0 0 /100  51   Absolute Neutrophil 3.5 1.6 - 8.3 10e9/L  51   Absolute Lymphocytes 0.5 0.8 - 5.3 10e9/L L 51   Absolute Monocytes 0.7 0.0 - 1.3 10e9/L  51   Absolute Eosinophils 0.0 0.0 - 0.7 10e9/L  51   Absolute Basophils 0.0 0.0 - 0.2 10e9/L  51   Abs Immature Granulocytes 0.0 0 - 0.4 10e9/L  51   Absolute Nucleated RBC 0.0   51            Comprehensive metabolic panel [[id number]] (Abnormal)  Resulted: 09/27/17 1541, Result status: Final result    Ordering provider: Filipe Allen MD  09/27/17 1502 Resulting lab: [facility]    Specimen Information    Type Source Collected On   Blood  09/27/17 1500          Components       Value Reference Range Flag Lab   Sodium 137 133 - 144 mmol/L  51   Potassium 4.0 3.4 - 5.3 mmol/L  51   Chloride 108 94 - 109 mmol/L  51   Carbon Dioxide 22 20 - 32 mmol/L  51   Anion Gap 8 3 - 14 mmol/L  51   Glucose 105 70 - 99 mg/dL H 51   Urea Nitrogen 11 7 - 30 mg/dL  51   Creatinine 0.82 0.66 - 1.25 mg/dL  51   GFR Estimate 88 >60 mL/min/1.7m2  51   Comment:  Non  GFR Calc   GFR Estimate If Black >90 >60 mL/min/1.7m2  51    Comment:  African American GFR Calc   Calcium 7.7 8.5 - 10.1 mg/dL L 51   Bilirubin Total 0.2 0.2 - 1.3 mg/dL  51   Albumin 2.5 3.4 - 5.0 g/dL L 51   Protein Total 5.4 6.8 - 8.8 g/dL L 51   Alkaline Phosphatase 68 40 - 150 U/L  51   ALT 18 0 - 70 U/L  51   AST 30 0 - 45 U/L  51            Magnesium [[id number]]  Resulted: 09/27/17 1541, Result status: Final result    Ordering provider: Filipe Allen MD  09/27/17 1502 Resulting lab: [facility]    Specimen Information    Type Source Collected On   Blood  09/27/17 1500          Components       Value Reference Range Flag Lab   Magnesium 1.8 1.6 - 2.3 mg/dL  51            Phosphorus [[id number]]  Resulted: 09/27/17 1541, Result status: Final result    Ordering provider: Filipe Allen MD  09/27/17 1502 Resulting lab: [facility]    Specimen Information    Type Source Collected On   Blood  09/27/17 1500          Components       Value Reference Range Flag Lab   Phosphorus 3.2 2.5 - 4.5 mg/dL  51            CBC with platelets [[id number]] (Abnormal)  Resulted: 09/27/17 1521, Result status: Final result    Ordering provider: Filipe Allen MD  09/27/17 1502 Resulting lab: [facility]    Specimen Information    Type Source Collected On   Blood  09/27/17 1500          Components       Value Reference Range Flag Lab   WBC 3.4 4.0 - 11.0 10e9/L L 51   RBC Count 2.98 4.4 - 5.9 10e12/L L 51   Hemoglobin 9.0 13.3 - 17.7 g/dL L 51   Hematocrit 28.1 40.0 - 53.0 % L 51   MCV 94 78 - 100 fl  51   MCH 30.2 26.5 - 33.0 pg  51   MCHC 32.0 31.5 - 36.5 g/dL  51   RDW 15.8 10.0 - 15.0 % H 51   Platelet Count 184 150 - 450 10e9/L  51            Comprehensive metabolic panel [[id number]] (Abnormal)  Resulted: 09/27/17 0955, Result status: Final result    Ordering provider: Cosme Gunn MD  09/27/17 0911 Resulting lab: [facility]    Specimen  Information    Type Source Collected On   Blood  09/27/17 0923          Components       Value Reference Range Flag Lab   Sodium 138 133 - 144 mmol/L  51   Potassium 4.7 3.4 - 5.3 mmol/L  51   Chloride 105 94 - 109 mmol/L  51   Carbon Dioxide 26 20 - 32 mmol/L  51   Anion Gap 7 3 - 14 mmol/L  51   Glucose 109 70 - 99 mg/dL H 51   Urea Nitrogen 12 7 - 30 mg/dL  51   Creatinine 1.01 0.66 - 1.25 mg/dL  51   GFR Estimate 69 >60 mL/min/1.7m2  51   Comment:  Non  GFR Calc   GFR Estimate If Black 84 >60 mL/min/1.7m2  51   Comment:  African American GFR Calc   Calcium 9.0 8.5 - 10.1 mg/dL  51   Bilirubin Total 0.4 0.2 - 1.3 mg/dL  51   Albumin 3.1 3.4 - 5.0 g/dL L 51   Protein Total 6.7 6.8 - 8.8 g/dL L 51   Alkaline Phosphatase 86 40 - 150 U/L  51   ALT 21 0 - 70 U/L  51   AST 32 0 - 45 U/L  51            INR [[id number]]  Resulted: 09/27/17 0947, Result status: Final result    Ordering provider: Cosme Gunn MD  09/27/17 0911 Resulting lab: [facility]    Specimen Information    Type Source Collected On   Blood  09/27/17 0923          Components       Value Reference Range Flag Lab   INR 0.97 0.86 - 1.14  51            CBC with platelets [[id number]] (Abnormal)  Resulted: 09/27/17 0933, Result status: Final result    Ordering provider: Cosme Gunn MD  09/27/17 0911 Resulting lab: [facility]    Specimen Information    Type Source Collected On   Blood  09/27/17 0923          Components       Value Reference Range Flag Lab   WBC 4.3 4.0 - 11.0 10e9/L  51   RBC Count 3.59 4.4 - 5.9 10e12/L L 51   Hemoglobin 10.6 13.3 - 17.7 g/dL L 51   Hematocrit 33.7 40.0 - 53.0 % L 51   MCV 94 78 - 100 fl  51   MCH 29.5 26.5 - 33.0 pg  51   MCHC 31.5 31.5 - 36.5 g/dL  51   RDW 15.9 10.0 - 15.0 % H 51   Platelet Count 256 150 - 450 10e9/L  51            Glucose by meter [[id number]]  Resulted: 09/27/17 0922, Result status: Final result     Ordering provider: Cosme Gunn MD  09/27/17 0918 Resulting lab: POINT OF CARE TEST, GLUCOSE    Specimen Information    Type Source Collected On     09/27/17 0918          Components       Value Reference Range Flag Lab   Glucose 85 70 - 99 mg/dL  170            Testing Performed By     Lab - Abbreviation Name Director Address Valid Date Range    51 - Unknown [facility] [place] Unknown [address] [facility] [address] 12/31/14 1010 - Present    170 - Unknown POINT OF CARE TEST, GLUCOSE Unknown Unknown 10/31/11 1114 - Present    225 - Unknown [facility] Unknown [address] [facility] [address] 12/19/14 0954 - Present               Imaging Results - 3 Days      CT Chest/Abd/Pelvis Angio w Processing [[id number]]  Resulted: 09/29/17 1451, Result status: Final result    Ordering provider: Ludy Emerson APRN CNP  09/28/17 1004 Resulted by: Luis Alfredo Rodriguez MD Dianat, Seyed Saeid, MD    Performed: 09/28/17 1142 - 09/28/17 1214 Resulting lab: RADIOLOGY RESULTS    Narrative:       Exam: Computed tomographic angiography of the chest, abdomen and  pelvis without and with contrast including 3D reformations dated  9/28/2017 12:14 PM    Clinical information: Evaluate retroperitoneal bleed/aortic pathology,  exclude aortic dissection, aortic valve replacement 9/27/2017    Technique: Axial images through the chest and abdomen obtained before  the administration of intravenous contrast media and following the  injection of contrast media  in the arterial phase. Source images  reviewed as well as 3D and multi-planar reconstructions at a 3D  workstation.    Contrast: 100cc Isovue 370    DLP: 1214 mGy*cm    Comparison: 3/8/2017.    Findings:      Normal caliber of the thoracic aorta. There is normal branching  pattern of the great vessels. Origins of the brachiocephalic artery,  left common carotid artery and left subclavian artery show no  focal  abnormality. There is marked tortuosity of the distal descending  thoracic aorta just above the level of the diaphragm.    Dilated main pulmonary artery measuring 3.5 cm in transverse  dimension, likely sequela of chronic pulmonary arterial hypertension.    The celiac axis, SMA and FUNMI are patent. Replaced left hepatic artery  from the left gastric artery. The single renal arteries are patent  bilaterally.    Chest and abdomen:     Left dual lead pectoral pacemaker. Post surgical changes of aortic  valve replacement. Atherosclerotic calcifications of the coronary  arteries. Cardiomegaly. Trace pericardial effusion.    Trachea and central airways are patent. Centrilobular and paraseptal  emphysema. No parenchymal consolidation. Some degree of bronchiectasis  and bronchial wall thickening. Reticulation, scarring and interlobular  septal thickening predominantly in the lung bases, with no definite  honeycombing pattern. Hiatal hernia.    There are multiple sub-6 mm pulmonary nodules throughout both lungs,  unchanged compared to 3/8/2017, for example 5 mm right upper lobe  centrilobular pulmonary nodule on series 14, image 131, and 4 mm left  upper lobe solid pulmonary nodule on series 14, image 89 and 82. No  new pulmonary nodule.    No pleural effusion. No pneumothorax. No hilar, mediastinal or  axillary lymphadenopathy is seen. Moderate-sized hiatal hernia with  herniation of gastric fundus.    The spleen, liver, adrenal glands, pancreas, kidneys and bones show no  focal abnormalities. Degenerative changes of the shoulder joints.    Extensive colonic diverticulosis without diverticulitis. Tiny focus of  gas in the nondependent aspect of the bladder, likely secondary to  prior catheterization.    No lymphadenopathy in the abdomen and pelvis by size criteria. There  is slight enlargement of a 10 mm enhancing nodule in the  retroperitoneum along the superior margin of the left adrenal gland  (series 6 image 519),  previously approximately 6 mm on 3/8/2017. No  retroperitoneal hematoma.    Severe degenerative disease of the lumbar spine, which appears  chronic. There are multiple healing bilateral rib fractures. There are  scattered sclerotic lesion of the right ribs, unchanged compared to  prior exam and consistent with osseous metastasis. Scattered sclerotic  lesions consistent with metastasis in the C6, T1, and T12, right  ilium. No acute pathologic fracture.       Impression:       Impression:  1. No evidence of aortic aneurysm or dissection. There is marked  tortuosity of the distal descending thoracic aorta.  2. Multiple pulmonary nodules measuring up to 5 mm, unchanged compared  to 3/8/2017. Recommend continued attention on follow-up.  3. Unchanged scattered sclerotic bone lesions, consistent with osseous  metastasis in this patient with prostate cancer. There is slight  growth of an enhancing 10 mm soft tissue nodule in the retroperitoneum  along the superior margin of the left adrenal gland, 6 mm on 3/8/2017.  4. No retroperitoneal hematoma or aortic pathology.    [Consider Follow Up: Slightly enlarged enhancing retroperitoneal lymph  node, attention on follow-up]    This report will be copied to the [facility] to ensure a  provider acknowledges the finding.               I have personally reviewed the examination and initial interpretation  and I agree with the findings.    ASMITA URBINA MD    Components       Value Reference Range Flag Lab   Radiologist flags Slightly enlarged enhancing retroperitoneal lymph               XR Chest Port 1 View [[id number]]  Resulted: 09/28/17 0820, Result status: Final result    Ordering provider: Filipe Allen MD  09/27/17 1638 Resulted by: Jody Riley MD Butler, Gretchen, MD    Performed: 09/28/17 0030 - 09/28/17 0308 Resulting lab: RADIOLOGY RESULTS    Narrative:       EXAM: XR CHEST PORT 1 VW  9/28/2017 3:08 AM      HISTORY: POD 1 S/P Transcatheter  aortic valve replacement    COMPARISON: 2017    FINDINGS: Aortic valve replacement. Left chest wall cardiac device and  lead tips in place. Interval removal of the previously described right  upper extremity PICC line.    The cardiomediastinal silhouette is stable. Pulmonary vasculature is  indistinct. Interstitial prominence without new focal airspace  opacity. Mild biapical pleural thickening. No pleural effusion or  pneumothorax.       Impression:       IMPRESSION:   1. Aortic valve replacement.  2. Prominent interstitial opacities, not significantly changed when  compared with prior exam, mild pulmonary vascular congestion. No new  focal airspace opacities.    I have personally reviewed the examination and initial interpretation  and I agree with the findings.    MARCELLA LOMBARDI MD      Testing Performed By     Lab - Abbreviation Name Director Address Valid Date Range    104 - Rad Rslts RADIOLOGY RESULTS Unknown Unknown 05 1553 - Present               ECG/EMG Results      ECHO LIMITED WITH OPTISON [[id number]]  Resulted: 17 0935, Result status: Edited Result - FINAL    Ordering provider: Jaymie Allen MD  17 1639 Resulted by: Anil Forte MD    Performed: 17 1021 - 17 1352 Resulting lab: RADIOLOGY RESULTS    Narrative:       [id number]  [id number]  [id number]  [id number]^JAYMIE           [facility],[place]  Echocardiography Laboratory  [address] [place] [id number]     Name: GALLO FLOYD  MRN: [id number]  : 1927  Study Date: 2017 09:35 AM  Age: 89 yrs  Gender: Male  Patient Location: [facility]  Reason For Study: S/P tavr  Ordering Physician: Jaymie Allen Dr  Referring Physician: ANA MARIA CERDA  Performed By: Yahir Cruz RDCS     BSA: 1.8 m2  Height: 66 in  Weight: 149 lb  HR: 103  BP: 125/44 mmHg  _____________________________________________________________________________  __         Procedure  Limited Portable Echo Adult. Contrast Optison. Technically difficult study.  Optison (NDC #9632-7951-32) given intravenously. Patient was given 4 ml  mixture of 3 ml Optison and 6 ml saline. 5 ml wasted.  _____________________________________________________________________________  __        Interpretation Summary  Technically difficult study.  Global and regional left ventricular function is normal with an EF of 60-65%.  A bioprosthetic aortic valve is present.# Sapiens S3 29 mm. Doppler  interrogation of the aortic valve is normal.  The mean gradient is 8 mmHg.No aortic regurgitation is present.  The inferior vena cava was normal in size with preserved respiratory  variability.  No pericardial effusion is present.     No change from prior.  _____________________________________________________________________________  __        Left Ventricle  Global and regional left ventricular function is normal with an EF of 60-65%.  Grade II or moderate diastolic dysfunction.     Right Ventricle  Global right ventricular function is normal.     Mitral Valve  Trace mitral insufficiency is present.     Aortic Valve  No aortic regurgitation is present. The calculated aortic valve are is 3.9  cm^2. Doppler interrogation of the aortic valve is normal. The mean gradient  is 8 mmHg. A bioprosthetic aortic valve is present.        Tricuspid Valve  Trace tricuspid insufficiency is present.     Vessels  The inferior vena cava was normal in size with preserved respiratory  variability.     Pericardium  No pericardial effusion is present.  _____________________________________________________________________________  __     MMode/2D Measurements & Calculations  IVSd: 1.2 cm  LVIDd: 4.6 cm  LVIDs: 3.0 cm  LVPWd: 0.99 cm  FS: 35.1 %  EDV(Teich): 98.8 ml  ESV(Teich): 35.1 ml  LV mass(C)d: 182.3 grams  LV mass(C)dI: 103.3 grams/m2  asc Aorta Diam: 3.1 cm  LVOT diam: 2.5 cm  LVOT area: 4.7 cm2  LA Volume (BP): 78.8 ml     LA  Volume Index (BP): 35.2 ml/m2        Doppler Measurements & Calculations  MV E max phyllis: 94.4 cm/sec  MV A max phyllis: 103.5 cm/sec  MV E/A: 0.91  MV dec time: 0.23 sec  Ao V2 max: 173.8 cm/sec  Ao max P.0 mmHg  Ao V2 mean: 133.3 cm/sec  Ao mean P.3 mmHg  Ao V2 VTI: 29.0 cm  TYREL(I,D): 3.9 cm2  TYREL(V,D): 3.5 cm2  LV V1 max P.0 mmHg  LV V1 max: 130.0 cm/sec  LV V1 VTI: 23.8 cm  SV(LVOT): 112.2 ml  SI(LVOT): 63.6 ml/m2  TYREL Index (cm2/m2): 2.2  Lateral E/e': 12.4  Medial E/e': 14.8        _____________________________________________________________________________  __        Report approved by: Kennedi Camilo 2017 02:57 PM       1    Type Source Collected On     17 0935          View Image (below)        Echocardiogram [[id number]]  Resulted: 17 1022, Result status: In process    Ordering provider: Filipe Allen MD  17 1639 Performed: 17 1021 - 17 1021    Resulting lab: [facility]ANT                   Encounter-Level Documents:     There are no encounter-level documents.      Order-Level Documents:     There are no order-level documents.

## 2025-06-19 NOTE — PLAN OF CARE
Goal Outcome Evaluation:  Plan of Care Reviewed With: patient, child        Progress: no change  Outcome Evaluation: VSS, on room air, voiding without issue, BM today, up to BSC w/ assist x1 walker, and gait belt. Tyelnol for pain, napped a small amount this afternoon, mag given per order- per MD wait to give k+ until mag is complete. Takes pills whole w/ water, A&O x3. Pt refuses O2 and pulse ox sensor - education provided. Awaiting d/c plan. Pt states she does not want to go to rehab.

## 2025-06-19 NOTE — PLAN OF CARE
Goal Outcome Evaluation:  Plan of Care Reviewed With: patient        Progress: no change  Outcome Evaluation: 02 sat 86% on room air offered oxygen pt strongly refused, up with assist to BSC, bed alarm for safety, SOA on exertion, CIWA score 2, continue to monitor the pt.

## 2025-06-19 NOTE — PROGRESS NOTES
Continued Stay Note  [facility]     Patient Name: Candice Walker  MRN: [id number]  Today's Date: 6/19/2025    Admit Date: 6/17/2025    Plan: SNF vs Home with family   Discharge Plan       Row Name 06/19/25 1648       Plan    Plan SNF vs Home with family    Plan Comments Attempted to discuss DC plan with patient but patient sleeping most of the day. Will FU in Am.                   Discharge Codes    No documentation.                 Expected Discharge Date and Time       Expected Discharge Date Expected Discharge Time    Jun 22, 2025               Mare Warner RN

## 2025-06-19 NOTE — THERAPY TREATMENT NOTE
Patient Name: Candice Walker  : 1935    MRN: [id number]                              Today's Date: 2025       Admit Date: 2025    Visit Dx:     ICD-10-CM ICD-9-CM   1. Urinary tract infection without hematuria, site unspecified  N39.0 599.0   2. Hyponatremia  E87.1 276.1   3. PHUONG (acute kidney injury)  N17.9 584.9   4. Altered mental status, unspecified altered mental status type  R41.82 780.97     Patient Active Problem List   Diagnosis    S/p reverse total shoulder arthroplasty    Acute UTI (urinary tract infection)    Hyponatremia    PHUONG (acute kidney injury)    Hypomagnesemia    Hypertension    Anemia    COPD (chronic obstructive pulmonary disease)    Alcohol use     Past Medical History:   Diagnosis Date    Anemia     Anxiety     Arthritis     Cancer 2006    BREAST CANCER, RIGHT MASTECTOMY    Chronic bronchitis     Depression     Disease of thyroid gland     GERD (gastroesophageal reflux disease)     Hard of hearing     Heart murmur     History of kidney stones     History of UTI 2017    HAD ABX    Hyperlipidemia     Hypertension     MRSA (methicillin resistant Staphylococcus aureus) infection s    EARLY , HERE AT [facility], LEFT KNEE  , D/W GISELLE IN INFECTION CONTROL    RBBB     Risk factors for obstructive sleep apnea     Shoulder pain     LEFT     Past Surgical History:   Procedure Laterality Date    APPENDECTOMY      CATARACT EXTRACTION      WITH LENS IMPLANT    CHOLECYSTECTOMY      CYST REMOVAL      ON SPINE    EYE SURGERY      HYSTERECTOMY      JOINT REPLACEMENT      LT KNEE    MASTECTOMY Right 2006    MASTECTOMY RADICAL WITH AXILLARY NODE DISSECTION Right     THYROIDECTOMY      TOTAL SHOULDER ARTHROPLASTY W/ DISTAL CLAVICLE EXCISION Left 2017    Procedure: LT TOTAL SHOULDER REVERSE ARTHROPLASTY;  Surgeon: Ezequiel Elizlade MD;  Location: [facility] OR [facility];  Service:       General Information       Row Name 25 0952          OT Time and Intention    Subjective Information no  complaints  -PP     Document Type therapy note (daily note)  -PP     Mode of Treatment individual therapy;occupational therapy  -PP     Symptoms Noted During/After Treatment none  -PP       Row Name 06/19/25 0952          General Information    Patient Profile Reviewed yes  -PP     Existing Precautions/Restrictions fall  -PP     Barriers to Rehab none identified  -PP       Row Name 06/19/25 0952          Cognition    Orientation Status (Cognition) oriented to;person;place;situation  -PP       Row Name 06/19/25 0952          Safety Issues/Impairments Affecting Functional Mobility    Impairments Affecting Function (Mobility) balance;endurance/activity tolerance;strength;pain;range of motion (ROM)  -PP     Comment, Safety Issues/Impairments (Mobility) gait belt and non skid socks worn for safety  -PP               User Key  (r) = Recorded By, (t) = Taken By, (c) = Cosigned By      Initials Name Provider Type    PP Joivta Gambino OT Occupational Therapist                     Mobility/ADL's       Row Name 06/19/25 1103          Bed Mobility    Supine-Sit Jack (Bed Mobility) not tested  -PP     Sit-Supine Jack (Bed Mobility) not tested  -PP     Comment, (Bed Mobility) Pt sitting EOB at session start w/ PCA and left EOB w/ RN at session end  -PP       Row Name 06/19/25 1103          Transfers    Transfers sit-stand transfer;toilet transfer  -PP       Row Name 06/19/25 1103          Sit-Stand Transfer    Sit-Stand Jack (Transfers) contact guard;verbal cues;minimum assist (75% patient effort)  -PP     Assistive Device (Sit-Stand Transfers) walker, front-wheeled  -PP     Comment, (Sit-Stand Transfer) 4x STS from EOB, 1x to BSC, additional stands for xfer training  -PP       Row Name 06/19/25 1103          Toilet Transfer    Type (Toilet Transfer) stand pivot/stand step  -PP     Jack Level (Toilet Transfer) minimum assist (75% patient effort);verbal cues;nonverbal cues (demo/gesture)  -PP      Assistive Device (Toilet Transfer) other (see comments)  HHA x1  -PP     Comment, (Toilet Transfer) cueing for hand and foot placement during transitional movements  -PP       Row Name 06/19/25 1103          Activities of Daily Living    BADL Assessment/Intervention toileting;grooming  -PP       Row Name 06/19/25 1103          Toileting Assessment/Training    Austin Level (Toileting) toileting skills;adjust/manage clothing;perform perineal hygiene;moderate assist (50% patient effort)  -PP     Assistive Devices (Toileting) commode, bedside without drop arms  -PP     Position (Toileting) supported standing;supported sitting  -PP       Row Name 06/19/25 1103          Grooming Assessment/Training    [place] Level (Grooming) grooming skills;set up  -PP     Position (Grooming) edge of bed sitting  -PP               User Key  (r) = Recorded By, (t) = Taken By, (c) = Cosigned By      Initials Name Provider Type    PP Maki, Jovita, OT Occupational Therapist                   Obj/Interventions       Row Name 06/19/25 1105          Motor Skills    Motor Skills functional endurance  -PP     Functional Endurance improving  -PP     Therapeutic Exercise --  Pt participated in shoulder shrugs, R arm raise, L shoulder sig impaired, Joe bicep curls, wrists flex/ext x10 reps to improve ROM for ADLs and xfers.  -PP       Row Name 06/19/25 1105          Balance    Balance Assessment sitting static balance;sitting dynamic balance;sit to stand dynamic balance;standing static balance;standing dynamic balance  -PP     Static Sitting Balance supervision  -PP     Dynamic Sitting Balance standby assist  -PP     Position, Sitting Balance supported;sitting edge of bed  -PP     Sit to Stand Dynamic Balance contact guard;minimal assist  -PP     Static Standing Balance contact guard  -PP     Dynamic Standing Balance minimal assist;contact guard  -PP     Position/Device Used, Standing Balance supported;other (see comments)  HHA   -PP     Balance Interventions standing;sitting;sit to stand;supported;static;dynamic;dynamic reaching  -PP               User Key  (r) = Recorded By, (t) = Taken By, (c) = Cosigned By      Initials Name Provider Type    PP Jovita Gambino OT Occupational Therapist                   Goals/Plan    No documentation.                  Clinical Impression       Row Name 06/19/25 1108          Pain Assessment    Pain Location shoulder;elbow;hip  -PP     Pain Side/Orientation right;generalized  -PP     Pain Management Interventions exercise or physical activity utilized;positioning techniques utilized  -PP     Response to Pain Interventions activity participation with increased pain;activity participation with tolerable pain  -PP       Row Name 06/19/25 1108          Plan of Care Review    Plan of Care Reviewed With patient  -PP     Progress no change  -PP     Outcome Evaluation Pt seen for OT session this AM, pleasantly agreeable despite c/o right side pain at shoulder/ elbow and hip t/o session. Session focused on ADL/fxnl xfer training, strength and endurance through completion of stand pivot step sit BSC xfer w/ Chris+ HHA, toileting w/ Mod A and UE therex/ AROM seated EOB. Patient displays improved tolerance/ endurance, requiring minimal rest breaks t/o session. Pt CGA-Chris + HHA for 3x additional STS xfer from EOB. Tolerating standing 3x for a few secs w/ CGA. Pt continues to benefit from skilled OT intervention to promote return to baseline.  -PP       Row Name 06/19/25 1108          Therapy Plan Review/Discharge Plan (OT)    Anticipated Discharge Disposition (OT) skilled nursing facility  -PP       Row Name 06/19/25 1108          Positioning and Restraints    Pre-Treatment Position in bed  -PP     Post Treatment Position bed  -PP     In Bed sitting EOB;with nsg  -PP               User Key  (r) = Recorded By, (t) = Taken By, (c) = Cosigned By      Initials Name Provider Type    PP Jovita Gambino OT  Occupational Therapist                   Outcome Measures    No documentation.                   Occupational Therapy Education       Title: PT OT SLP Therapies (Done)       Topic: Occupational Therapy (Done)       Point: ADL training (Done)       Learning Progress Summary            Patient Acceptance, E, VU by HE at 6/18/2025 0952    Comment: Pt educated on goals of session, role of OT, OT POC.                      Point: Home exercise program (Done)       Learning Progress Summary            Patient Acceptance, E, VU by HE at 6/18/2025 0952    Comment: Pt educated on goals of session, role of OT, OT POC.                      Point: Precautions (Done)       Learning Progress Summary            Patient Acceptance, E, VU by HE at 6/18/2025 0952    Comment: Pt educated on goals of session, role of OT, OT POC.                      Point: Body mechanics (Done)       Learning Progress Summary            Patient Acceptance, E, VU by HE at 6/18/2025 0952    Comment: Pt educated on goals of session, role of OT, OT POC.                                      User Key       Initials Effective Dates Name Provider Type Discipline    HE 02/18/25 -  Mamie Thomas OT Occupational Therapist OT                  OT Recommendation and Plan     Plan of Care Review  Plan of Care Reviewed With: patient  Progress: no change  Outcome Evaluation: Pt seen for OT session this AM, pleasantly agreeable despite c/o right side pain at shoulder/ elbow and hip t/o session. Session focused on ADL/fxnl xfer training, strength and endurance through completion of stand pivot step sit BSC xfer w/ Chris+ HHA, toileting w/ Mod A and UE therex/ AROM seated EOB. Patient displays improved tolerance/ endurance, requiring minimal rest breaks t/o session. Pt CGA-Chris + HHA for 3x additional STS xfer from EOB. Tolerating standing 3x for a few secs w/ CGA. Pt continues to benefit from skilled OT intervention to promote return to baseline.     Time Calculation:                    Jovita Gambino, OT  6/19/2025

## 2025-06-19 NOTE — SIGNIFICANT NOTE
06/19/25 9210   OTHER   Discipline physical therapist   Rehab Time/Intention   Session Not Performed other (see comments)  (Pt. just returned to bed and reports ongoing fatigue d/t no sleep over the past two days. RN aware, PT will f/u next service date as appropriate.)   Therapy Assessment/Plan (PT)   Criteria for Skilled Interventions Met (PT) yes   Recommendation   PT - Next Appointment 06/20/25

## 2025-06-20 LAB
ALBUMIN SERPL-MCNC: 2.9 G/DL (ref 3.5–5.2)
ANION GAP SERPL CALCULATED.3IONS-SCNC: 10.2 MMOL/L (ref 5–15)
BUN SERPL-MCNC: 23 MG/DL (ref 8–23)
BUN/CREAT SERPL: 26.7 (ref 7–25)
CALCIUM SPEC-SCNC: 9.1 MG/DL (ref 8.6–10.5)
CHLORIDE SERPL-SCNC: 98 MMOL/L (ref 98–107)
CO2 SERPL-SCNC: 22.8 MMOL/L (ref 22–29)
CREAT SERPL-MCNC: 0.86 MG/DL (ref 0.57–1)
EGFRCR SERPLBLD CKD-EPI 2021: 64.7 ML/MIN/1.73
GLUCOSE SERPL-MCNC: 90 MG/DL (ref 65–99)
MAGNESIUM SERPL-MCNC: 2.3 MG/DL (ref 1.6–2.4)
PHOSPHATE SERPL-MCNC: 2.6 MG/DL (ref 2.5–4.5)
POTASSIUM SERPL-SCNC: 4.1 MMOL/L (ref 3.5–5.2)
SODIUM SERPL-SCNC: 131 MMOL/L (ref 136–145)

## 2025-06-20 PROCEDURE — 97535 SELF CARE MNGMENT TRAINING: CPT

## 2025-06-20 PROCEDURE — 97530 THERAPEUTIC ACTIVITIES: CPT

## 2025-06-20 PROCEDURE — 80069 RENAL FUNCTION PANEL: CPT | Performed by: INTERNAL MEDICINE

## 2025-06-20 PROCEDURE — 25010000002 ONDANSETRON PER 1 MG

## 2025-06-20 PROCEDURE — 97162 PT EVAL MOD COMPLEX 30 MIN: CPT

## 2025-06-20 PROCEDURE — 83735 ASSAY OF MAGNESIUM: CPT | Performed by: INTERNAL MEDICINE

## 2025-06-20 RX ORDER — HYDROCODONE BITARTRATE AND ACETAMINOPHEN 5; 325 MG/1; MG/1
1 TABLET ORAL EVERY 4 HOURS PRN
Refills: 0 | Status: DISCONTINUED | OUTPATIENT
Start: 2025-06-20 | End: 2025-06-25 | Stop reason: HOSPADM

## 2025-06-20 RX ADMIN — NITROFURANTOIN MONOHYDRATE/MACROCRYSTALLINE 100 MG: 25; 75 CAPSULE ORAL at 20:30

## 2025-06-20 RX ADMIN — ONDANSETRON 4 MG: 2 INJECTION, SOLUTION INTRAMUSCULAR; INTRAVENOUS at 12:23

## 2025-06-20 RX ADMIN — NITROFURANTOIN MONOHYDRATE/MACROCRYSTALLINE 100 MG: 25; 75 CAPSULE ORAL at 09:45

## 2025-06-20 RX ADMIN — VALSARTAN: 160 TABLET, FILM COATED ORAL at 20:30

## 2025-06-20 RX ADMIN — FERROUS SULFATE TAB 325 MG (65 MG ELEMENTAL FE) 325 MG: 325 (65 FE) TAB at 09:46

## 2025-06-20 RX ADMIN — SERTRALINE HYDROCHLORIDE 100 MG: 100 TABLET, FILM COATED ORAL at 09:45

## 2025-06-20 RX ADMIN — LEVOTHYROXINE SODIUM 125 MCG: 125 TABLET ORAL at 09:45

## 2025-06-20 RX ADMIN — HYDROCODONE BITARTRATE AND ACETAMINOPHEN 1 TABLET: 5; 325 TABLET ORAL at 20:30

## 2025-06-20 RX ADMIN — BISOPROLOL FUMARATE 5 MG: 5 TABLET ORAL at 09:47

## 2025-06-20 RX ADMIN — PANTOPRAZOLE SODIUM 40 MG: 40 TABLET, DELAYED RELEASE ORAL at 05:00

## 2025-06-20 RX ADMIN — ATORVASTATIN CALCIUM 10 MG: 20 TABLET, FILM COATED ORAL at 09:45

## 2025-06-20 RX ADMIN — AMLODIPINE BESYLATE 2.5 MG: 5 TABLET ORAL at 09:46

## 2025-06-20 NOTE — PROGRESS NOTES
Continued Stay Note  [facility]     Patient Name: Candice Walker  MRN: [id number]  Today's Date: 6/20/2025    Admit Date: 6/17/2025    Plan: SNF (referral in [facility]/Pending)   Discharge Plan       Row Name 06/20/25 1520       Plan    Plan SNF (referral in [facility]/Pending)    Plan Comments Msg sent to [facility] with Alessandra to inform PT note is in [facility] for her to review.      Row Name 06/20/25 1400       Plan    Plan SNF (referral in [facility]/PENDING)    Plan Comments Per [facility] with Abel, they need to see who she does with PT today before can accept. No beds at [facility] but they do have a bed at [facility]. Waiting on PT note.                   Discharge Codes    No documentation.                 Expected Discharge Date and Time       Expected Discharge Date Expected Discharge Time    Jun 22, 2025               Mare Warner, RN

## 2025-06-20 NOTE — PLAN OF CARE
Goal Outcome Evaluation:              Outcome Evaluation: Pt was able to complete functional mobilit in room to BSC. Pt req A for toileting and brushign hair.  Pt at this time cont to demo impaired endurance, weakness, and B hand deficitis impacting ADLs indicating the need for OT. Pt at this time would benefit from d/c to [facility]/SNF.    Anticipated Discharge Disposition (OT): skilled nursing facility, sub acute care setting

## 2025-06-20 NOTE — PLAN OF CARE
Goal Outcome Evaluation:  Plan of Care Reviewed With: patient        Progress: no change  Outcome Evaluation: Pt is a 89 y.o. female admitted to [facility] with c/o abnormal urinary patterns on 6/17/2025. Prior to hospital admission, pt reports residing at home w/ granddaughter, daughter, and additional family members and 0 LEVY. Prior to hospital stay, pt was IADLs and utilized RWx AD at baseline. Pt noted w/ decreased R knee mobility due to elevated pain, noticeable swelling and slightly warm to touch upon assessment. Pt was UIC upon entry, required increased encouragement to perform, Chris/CGA for STS transfers from bedside commode and bedside recliner, and Chris/CGA for ambulation with RWx for 10 ft. Pt presents today with below baseline strength, dec endurance, and decreased functional mobility. Pt will benefit from skilled PT to address the previous deficits and improve overall safety with functional mobility. Anticipate pt will D/C to SNF pending progress.    Anticipated Discharge Disposition (PT): skilled nursing facility

## 2025-06-20 NOTE — PROGRESS NOTES
Continued Stay Note  [facility]     Patient Name: Candice Walker  MRN: [id number]  Today's Date: 6/20/2025    Admit Date: 6/17/2025    Plan: SNF (referral in [facility]/PENDING)   Discharge Plan       Row Name 06/20/25 1400       Plan    Plan SNF (referral in [facility]/PENDING)    Plan Comments Per Jania with [facility], they need to see who she does with PT today before can accept. No beds at [facility] but they do have a bed at [facility]. Waiting on PT note.                   Discharge Codes    No documentation.                 Expected Discharge Date and Time       Expected Discharge Date Expected Discharge Time    Jun 22, 2025               Mare Warner RN

## 2025-06-20 NOTE — PROGRESS NOTES
Continued Stay Note  [facility]     Patient Name: Candice Walker  MRN: [id number]  Today's Date: 6/20/2025    Admit Date: 6/17/2025    Plan: SNF (Referrals in [facility]/PENDING)   Discharge Plan       Row Name 06/20/25 1539       Plan    Plan SNF (Referrals in [facility]/PENDING)    Plan Comments Per Jania with [facility], next available bed is Monday. She will continue to follow.      Row Name 06/20/25 1520       Plan    Plan SNF (referral in [facility]/Pending)    Plan Comments Msg sent to [facility] aj Aparicio to inform PT note is in [facility] for her to review.      Row Name 06/20/25 1400       Plan    Plan SNF (referral in [facility]/PENDING)    Plan Comments [facility], they need to see who she does with PT today before can accept. No beds at [facility] but they do have a bed at [facility]. Waiting on PT note.                   Discharge Codes    No documentation.                 Expected Discharge Date and Time       Expected Discharge Date Expected Discharge Time    Jun 23, 2025               Mare Warner, RN

## 2025-06-20 NOTE — PROGRESS NOTES
"   LOS: 2 days     Chief Complaint/ Reason for encounter: Hyponatremia, PHUONG    Subjective   06/19/25 : Patient is doing  better today with no new complaints  Good appetite with no nausea or vomiting  No shortness of breath chest pain or edema  Voiding well with no dysuria    6/20         Medical history reviewed:  History of Present Illness    Subjective    History taken from: Chart and patient/family as able    Vital Signs  Temp:  [96.6 °F (35.9 °C)-98 °F (36.7 °C)] 97 °F (36.1 °C)  Heart Rate:  [52-56] 56  Resp:  [18] 18  BP: (131-164)/(68-79) 164/74       Wt Readings from Last 1 Encounters:   06/18/25 0038 64.2 kg (141 lb 8.6 oz)   06/17/25 2243 63.6 kg (140 lb 3.4 oz)       Objective:  Vital signs: (most recent): Blood pressure 164/74, pulse 56, temperature 97 °F (36.1 °C), temperature source Oral, resp. rate 18, height 157.5 cm (62.01\"), weight 64.2 kg (141 lb 8.6 oz), SpO2 93%.                Objective:  General Appearance:  Comfortable, chronically ill-appearing, no acute distress   HEENT: Mucous membranes moist, atraumatic  Lungs:  Normal effort and normal respiratory rate.  Breath sounds clear to auscultation: No rhonchi/Rales.  No  respiratory distress.   Heart:  S1, S2 normal.   Abdomen: Abdomen is soft, nontender/nondistended  Extremities: No significant edema of bilateral lower extremities  Skin:  Warm and dry       Results Review:    Intake/Output:     Intake/Output Summary (Last 24 hours) at 6/20/2025 1340  Last data filed at 6/20/2025 0022  Gross per 24 hour   Intake 630 ml   Output --   Net 630 ml         DATA:  Radiology and Labs:  The following labs independently reviewed by me. Additional labs ordered for tomorrow a.m.  Interval notes, chart personally reviewed by me.   Old records independently reviewed showing baseline creatinine around 1  The following radiologic studies independently viewed by me, findings recent CT angio with a IV protocol showed no PE, some cardiomegaly and pulmonary " edema  New problems include hypokalemia, hyponatremia  Discussed with patient himself at bedside    Risk/ complexity of medical care/ medical decision making moderate complexity, electrolyte management    Labs:   Recent Results (from the past 24 hours)   Renal Function Panel    Collection Time: 06/20/25  3:51 AM    Specimen: Blood   Result Value Ref Range    Glucose 90 65 - 99 mg/dL    BUN 23.0 8.0 - 23.0 mg/dL    Creatinine 0.86 0.57 - 1.00 mg/dL    Sodium 131 (L) 136 - 145 mmol/L    Potassium 4.1 3.5 - 5.2 mmol/L    Chloride 98 98 - 107 mmol/L    CO2 22.8 22.0 - 29.0 mmol/L    Calcium 9.1 8.6 - 10.5 mg/dL    Albumin 2.9 (L) 3.5 - 5.2 g/dL    Phosphorus 2.6 2.5 - 4.5 mg/dL    Anion Gap 10.2 5.0 - 15.0 mmol/L    BUN/Creatinine Ratio 26.7 (H) 7.0 - 25.0    eGFR 64.7 >60.0 mL/min/1.73   Magnesium    Collection Time: 06/20/25  3:51 AM    Specimen: Blood   Result Value Ref Range    Magnesium 2.3 1.6 - 2.4 mg/dL       Radiology:  Pertinent radiology studies were reviewed as described above      Medications have been reviewed separately in chart review medication tab      ASSESSMENT:    Assessment & Plan  PHUONG, improved and nearing baseline  Hyponatremia, steady increase, correcting acceptable rate  Metabolic acidosis, improved  UTI   HTN, near goal for age  EtOH abdulaziz   Hypomagnesemia, improved  8. COPD.      Plan:  Her renal function continues to improve  Cr now at 0.86 with Na at 131   Continue to Monitor off IV fluids     Bp trending up   Will resume arb   She was on combination of amlodipine/valsartan at home   Will resume that combination     Stay off hctz indefinitely     Encourage nutrition and oral hydration  Continue abx.  Dosed for GFR 40-60        Jeny Victoria MD  [facility]   Office phone number: [phone]  Answering service phone number: [phone]    06/20/25  13:40 EDT    Dictation performed using Dragon dictation software

## 2025-06-20 NOTE — DISCHARGE PLACEMENT REQUEST
"Bailey Foster (89 y.o. Female)       Date of Birth   1935    Social Security Number       Address   [address] [place], [place] [address]    Home Phone   [phone]    MRN   [id number]       Rastafarian   Sabianism    Marital Status                               Admission Date   6/17/2025    Admission Type   Emergency    Admitting Provider   Main Taveras MD    Attending Provider   Main Taveras MD    Department, Room/Bed   [address], P691/1       Discharge Date       Discharge Disposition       Discharge Destination                                 Attending Provider: Main Taveras MD    Allergies: Cefaclor, Penicillins, Sulfa Antibiotics, Nickel    Isolation: None   Infection: None   Code Status: CPR    Ht: 157.5 cm (62.01\")   Wt: 64.2 kg (141 lb 8.6 oz)    Admission Cmt: None   Principal Problem: PHUONG (acute kidney injury) [N17.9]                   Active Insurance as of 6/17/2025       Primary Coverage       Payor Plan Insurance Group Employer/Plan Group    HUMANA MEDICARE REPLACEMENT HUMANA MEDICARE ADVANTAGE PPO [id number]       Payor Plan Address Payor Plan Phone Number Payor Plan Fax Number Effective Dates    [address] [address] [phone]  3/1/2025 - None Entered    [facility] [place] [address]         Subscriber Name Subscriber Birth Date Member ID       BAILEY FOSTER 1935 [id number]                     Emergency Contacts        (Rel.) Home Phone Work Phone Mobile Phone    Paty Jones (Daughter) -- -- [phone]    MARICARMEN JONES (Grandchild) [phone] -- --                "

## 2025-06-20 NOTE — PROGRESS NOTES
Name: Candice Walker ADMIT: 2025   : 1935  PCP: Provider, No Known    MRN: [id number] LOS: 2 days   AGE/SEX: 89 y.o. female  ROOM: [facility]     Subjective   Subjective   Resting in bed.  No acute problems.    Objective   Objective   Vital Signs  Temp:  [96.6 °F (35.9 °C)-98 °F (36.7 °C)] 97 °F (36.1 °C)  Heart Rate:  [52-56] 56  Resp:  [18] 18  BP: (131-164)/(68-79) 164/74  SpO2:  [93 %-94 %] 93 %  on   ;   Device (Oxygen Therapy): room air  Body mass index is 25.88 kg/m².  Physical Exam  Constitutional:       General: She is not in acute distress.     Comments: Very hard of hearing   Pulmonary:      Effort: Pulmonary effort is normal. No respiratory distress.      Breath sounds: No stridor.   Skin:     Coloration: Skin is not jaundiced.      Findings: No bruising.   Neurological:      Mental Status: She is alert.         Results Review     I reviewed the patient's new clinical results.  Results from last 7 days   Lab Units 25  1210 25  0356 255   WBC 10*3/mm3 5.46 7.59 6.91   HEMOGLOBIN g/dL 8.4* 8.9* 9.6*   PLATELETS 10*3/mm3 221 250 270     Results from last 7 days   Lab Units 25  0351 25  1210 25  0356 255   SODIUM mmol/L 131* 127* 125* 126*   POTASSIUM mmol/L 4.1 2.7* 3.0* 3.5   CHLORIDE mmol/L 98 93* 92* 93*   CO2 mmol/L 22.8 22.2 18.6* 17.6*   BUN mg/dL 23.0 27.0* 38.0* 39.0*   CREATININE mg/dL 0.86 1.03* 1.25* 1.47*   GLUCOSE mg/dL 90 95 93 78   EGFR mL/min/1.73 64.7 52.1* 41.3* 34.0*     Results from last 7 days   Lab Units 25  0351 25  2125   ALBUMIN g/dL 2.9* 3.5   BILIRUBIN mg/dL  --  0.4   ALK PHOS U/L  --  145*   AST (SGOT) U/L  --  14   ALT (SGPT) U/L  --  10     Results from last 7 days   Lab Units 25  0351 25  1210 25  0356 25  2125   CALCIUM mg/dL 9.1 8.6 8.3* 8.4*   ALBUMIN g/dL 2.9*  --   --  3.5   MAGNESIUM mg/dL 2.3  --  2.3 1.0*   PHOSPHORUS mg/dL 2.6  --   --   --        No results found  "for: \"HGBA1C\", \"POCGLU\"    No radiology results for the last day  Scheduled Medications  amLODIPine, 2.5 mg, Oral, BID  atorvastatin, 10 mg, Oral, Daily  bisoprolol, 5 mg, Oral, Daily  budesonide-formoterol, 2 puff, Inhalation, BID - RT  ferrous sulfate, 325 mg, Oral, Daily  [Held by provider] furosemide, 40 mg, Oral, Daily  levothyroxine, 125 mcg, Oral, Daily  nitrofurantoin (macrocrystal-monohydrate), 100 mg, Oral, Q12H  pantoprazole, 40 mg, Oral, Q AM  sertraline, 100 mg, Oral, Daily    Infusions   Diet  Diet: Cardiac; Healthy Heart (2-3 Na+); Fluid Consistency: Thin (IDDSI 0)       Assessment/Plan     Active Hospital Problems    Diagnosis  POA    **PHUONG (acute kidney injury) [N17.9]  Yes    COPD (chronic obstructive pulmonary disease) [J44.9]  Yes    Alcohol use [F10.90]  Yes    Acute UTI (urinary tract infection) [N39.0]  Yes    Hyponatremia [E87.1]  Yes    Hypomagnesemia [E83.42]  Yes    Hypertension [I10]  Yes    Anemia [D64.9]  Yes      Resolved Hospital Problems   No resolved problems to display.       89 y.o. female admitted with PHUONG (acute kidney injury).      06/20/25  Renal function has stabilized, SNF referrals are pending.    Acute kidney injury, resolved  Hyponatremia  -Creatinine 1.47 OA (b/l ~0.9) > 0.8  -Nephrology following     Hypokalemia, hypomagnesemia, replete    Acute cystitis without hematuria  - Urinalysis nitrite positive, with 4+ bacteria, and 3-5 WBC  - Continue Macrobid      COPD  - Home bronchodilators     Alcohol use  - CIWA protocol     Anemia  -Hgb 9.6, previous Hgb 9.5 from 7/18/2024  - Home PO iron     Hypertension  -amlodipine, bisoprolol at home; HCTZ, Lasix ON HOLD  -plan to dc HCTZ permanently         DVT prophylaxis: SCDs  Discussed with patient and nursing staff.  Anticipated discharge SNF, once arrangements made            Main Taveras MD  [facility]  06/20/25  12:59 EDT      "

## 2025-06-20 NOTE — PLAN OF CARE
Goal Outcome Evaluation:  Plan of Care Reviewed With: patient        Progress: no change  Outcome Evaluation: Patient c/o right knee pain, refused Tylenol, MD aware no new orders received, patient has diarrhea, emesis x2 after eating, given Zofran, vss, afebrile, sat in chair, assist x1 with walker, falls precautions maintained.

## 2025-06-20 NOTE — PLAN OF CARE
Goal Outcome Evaluation:  Plan of Care Reviewed With: patient        Progress: improving  Outcome Evaluation: Alert and oriented. VSS. On room air. C/o of neck pain. Refused Tylenol. Urinary urgency, assistedX1 to BSC, voided, had small soft BM. Z guard barrier cream applied. Mg and K+ replaced. Slept well in between care.

## 2025-06-20 NOTE — THERAPY EVALUATION
Patient Name: Candice Walker  : 1935    MRN: [id number]                              Today's Date: 2025       Admit Date: 2025    Visit Dx:     ICD-10-CM ICD-9-CM   1. Urinary tract infection without hematuria, site unspecified  N39.0 599.0   2. Hyponatremia  E87.1 276.1   3. PHUONG (acute kidney injury)  N17.9 584.9   4. Altered mental status, unspecified altered mental status type  R41.82 780.97     Patient Active Problem List   Diagnosis    S/p reverse total shoulder arthroplasty    Acute UTI (urinary tract infection)    Hyponatremia    PHUONG (acute kidney injury)    Hypomagnesemia    Hypertension    Anemia    COPD (chronic obstructive pulmonary disease)    Alcohol use     Past Medical History:   Diagnosis Date    Anemia     Anxiety     Arthritis     Cancer 2006    BREAST CANCER, RIGHT MASTECTOMY    Chronic bronchitis     Depression     Disease of thyroid gland     GERD (gastroesophageal reflux disease)     Hard of hearing     Heart murmur     History of kidney stones     History of UTI 2017    HAD ABX    Hyperlipidemia     Hypertension     MRSA (methicillin resistant Staphylococcus aureus) infection s    EARLY , HERE AT [facility], LEFT KNEE  , D/W GISELLE IN INFECTION CONTROL    RBBB     Risk factors for obstructive sleep apnea     Shoulder pain     LEFT     Past Surgical History:   Procedure Laterality Date    APPENDECTOMY      CATARACT EXTRACTION      WITH LENS IMPLANT    CHOLECYSTECTOMY      CYST REMOVAL      ON SPINE    EYE SURGERY      HYSTERECTOMY      JOINT REPLACEMENT      LT KNEE    MASTECTOMY Right 2006    MASTECTOMY RADICAL WITH AXILLARY NODE DISSECTION Right     THYROIDECTOMY      TOTAL SHOULDER ARTHROPLASTY W/ DISTAL CLAVICLE EXCISION Left 2017    Procedure: LT TOTAL SHOULDER REVERSE ARTHROPLASTY;  Surgeon: Ezequiel Elizalde MD;  [place] [facility] [place] [facility]  Service:       General Information       Row Name 25 1416          Physical Therapy Time and Intention    Document Type  evaluation  -CS     Mode of Treatment co-treatment;physical therapy;occupational therapy  -CS       Row Name 06/20/25 1416          General Information    Patient Profile Reviewed yes  -CS     Prior Level of Function independent:;all household mobility;ADL's  -CS     Existing Precautions/Restrictions fall  -CS       Row Name 06/20/25 1416          Living Environment    Current Living Arrangements home  -CS     People in Home child(greg), adult;child(greg), dependent;other relative(s)  -CS       Row Name 06/20/25 1416          Home Main Entrance    Number of Stairs, Main Entrance none  -CS       Row Name 06/20/25 1416          Cognition    Orientation Status (Cognition) oriented to;person;place;situation  -CS       Row Name 06/20/25 1416          Safety Issues/Impairments Affecting Functional Mobility    Safety Issues Affecting Function (Mobility) ability to follow commands  -CS     Impairments Affecting Function (Mobility) balance;endurance/activity tolerance;strength;pain;range of motion (ROM)  -CS     Comment, Safety Issues/Impairments (Mobility) Gait belt and non-skid socks donned  -CS               User Key  (r) = Recorded By, (t) = Taken By, (c) = Cosigned By      Initials Name Provider Type    CS Wilder Pineda PT Physical Therapist                   Mobility       Row Name 06/20/25 1417          Bed Mobility    Comment, (Bed Mobility) UIC upon entry  -CS       Row Name 06/20/25 1417          Sit-Stand Transfer    Sit-Stand [place] (Transfers) contact guard;verbal cues;minimum assist (75% patient effort)  -CS     Assistive Device (Sit-Stand Transfers) walker, front-wheeled  -CS     Comment, (Sit-Stand Transfer) STS from bedside chair and BSC  -CS       Row Name 06/20/25 1417          Gait/Stairs (Locomotion)    [place] Level (Gait) minimum assist (75% patient effort);contact guard  -CS     Assistive Device (Gait) walker, front-wheeled  -CS     Patient was able to Ambulate yes  -CS     Distance in Feet  (Gait) 10  -CS     Deviations/Abnormal Patterns (Gait) stride length decreased;lata decreased  -CS     Bilateral Gait Deviations forward flexed posture  -     [place] Level (Stairs) not tested  -CS     Comment, (Gait/Stairs) Increased time to perform reports increased R knee pain w/ ambulation  -               User Key  (r) = Recorded By, (t) = Taken By, (c) = Cosigned By      Initials Name Provider Type    CS Wilder Pineda, CLARE Physical Therapist                   Obj/Interventions       Row Name 06/20/25 1419          Range of Motion Comprehensive    Comment, General Range of Motion Decreased funcitonal ability w/ bilateral hands; notable decreased R knee mobility due to pain and swelling  -       Row Name 06/20/25 1419          Strength Comprehensive (MMT)    Comment, General Manual Muscle Testing (MMT) Assessment LLE 3+/5; RLE 3/5 expect for knee 2/5 due to decreased mobility  -       Row Name 06/20/25 1419          Balance    Balance Assessment sitting static balance;sitting dynamic balance;standing static balance;standing dynamic balance  -     Static Sitting Balance supervision  -     Dynamic Sitting Balance supervision;standby assist  -     Position, Sitting Balance sitting in chair  -     Static Standing Balance contact guard  -     Dynamic Standing Balance contact guard;minimal assist  -     Position/Device Used, Standing Balance supported;walker, front-wheeled  -     Balance Interventions sitting;standing;sit to stand;supported;static;dynamic  -     Comment, Balance Chris for safety w/ mobility as she appears unsteady at times, but able to perform w/ CGA for a few feet  -               User Key  (r) = Recorded By, (t) = Taken By, (c) = Cosigned By      Initials Name Provider Type     Wilder Pineda, CLARE Physical Therapist                   Goals/Plan       Row Name 06/20/25 1422          Bed Mobility Goal 1 (PT)    Activity/Assistive Device (Bed Mobility Goal 1, PT) bed  mobility activities, all  -CS     [place] Level/Cues Needed (Bed Mobility Goal 1, PT) standby assist  -CS     Time Frame (Bed Mobility Goal 1, PT) short term goal (STG);10 days  -CS       Row Name 06/20/25 1422          Transfer Goal 1 (PT)    Activity/Assistive Device (Transfer Goal 1, PT) transfers, all  -CS     [place] Level/Cues Needed (Transfer Goal 1, PT) standby assist  -CS     Time Frame (Transfer Goal 1, PT) short term goal (STG);10 days  -CS       Row Name 06/20/25 1422          Gait Training Goal 1 (PT)    Activity/Assistive Device (Gait Training Goal 1, PT) gait (walking locomotion)  -CS     [place] Level (Gait Training Goal 1, PT) contact guard required  -CS     Distance (Gait Training Goal 1, PT) 50ft  -CS     Time Frame (Gait Training Goal 1, PT) short term goal (STG);10 days  -CS               User Key  (r) = Recorded By, (t) = Taken By, (c) = Cosigned By      Initials Name Provider Type    CS Wilder Pineda, PT Physical Therapist                   Clinical Impression       Row Name 06/20/25 1420          Pain    Pretreatment Pain Rating 0/10 - no pain  -CS     Pain Location knee;hip;shoulder;neck  -CS     Pain Side/Orientation right  -CS     Pain Management Interventions activity modification encouraged;diversional activity provided  -CS     Response to Pain Interventions activity participation with tolerable pain  -CS     Additional Documentation Pain Scale: FACES Pre/Post-Treatment (Group)  -CS       Row Name 06/20/25 1420          Pain Scale: FACES Pre/Post-Treatment    Pain: FACES Scale, Pretreatment 0-->no hurt  -CS     Posttreatment Pain Rating 4-->hurts little more  -CS       Row Name 06/20/25 1420          Plan of Care Review    Plan of Care Reviewed With patient  -CS     Progress no change  -CS     Outcome Evaluation Pt is a 89 y.o. female admitted to [facility] with c/o abnormal urinary patterns on 6/17/2025. Prior to hospital admission, pt reports residing at home w/ granddaughter,  daughter, and additional family members and 0 LEVY. Prior to hospital stay, pt was IADLs and utilized RWx AD at baseline. Pt noted w/ decreased R knee mobility due to elevated pain, noticeable swelling and slightly warm to touch upon assessment. Pt was UIC upon entry, required increased encouragement to perform, Chris/CGA for STS transfers from bedside commode and bedside recliner, and Chris/CGA for ambulation with RWx for 10 ft. Pt presents today with below baseline strength, dec endurance, and decreased functional mobility. Pt will benefit from skilled PT to address the previous deficits and improve overall safety with functional mobility. Anticipate pt will D/C to SNF pending progress.  -CS       Row Name 06/20/25 1420          Therapy Assessment/Plan (PT)    Rehab Potential (PT) good  -CS     Criteria for Skilled Interventions Met (PT) yes  -CS     Therapy Frequency (PT) 5 times/wk  -CS       Row Name 06/20/25 1420          Vital Signs    O2 Delivery Pre Treatment room air  -CS     O2 Delivery Intra Treatment room air  -CS     O2 Delivery Post Treatment room air  -CS     Pre Patient Position Sitting  -CS     Intra Patient Position Standing  -CS     Post Patient Position Sitting  -CS       Row Name 06/20/25 1420          Positioning and Restraints    Pre-Treatment Position sitting in chair/recliner  -CS     Post Treatment Position chair  -CS     In Bed --  -CS     In Chair notified nsg;sitting;call light within reach;exit alarm on;encouraged to call for assist  -CS               User Key  (r) = Recorded By, (t) = Taken By, (c) = Cosigned By      Initials Name Provider Type    CS Wilder Pineda, CLARE Physical Therapist                   Outcome Measures       Row Name 06/20/25 1422 06/20/25 0955       How much help from another person do you currently need...    Turning from your back to your side while in flat bed without using bedrails? 3  -CS 3  -PS    Moving from lying on back to sitting on the side of a flat bed  without bedrails? 3  -CS 3  -PS    Moving to and from a bed to a chair (including a wheelchair)? 3  -CS 3  -PS    Standing up from a chair using your arms (e.g., wheelchair, bedside chair)? 3  -CS 3  -PS    Climbing 3-5 steps with a railing? 2  -CS 2  -PS    To walk in hospital room? 3  -CS 3  -PS    AM-PAC 6 Clicks Score (PT) 17  -CS 17  -PS              User Key  (r) = Recorded By, (t) = Taken By, (c) = Cosigned By      Initials Name Provider Type    PS Shi Phan, RN Registered Nurse    Wilder Hilton, PT Physical Therapist                                 Physical Therapy Education       Title: PT OT SLP Therapies (Done)       Topic: Physical Therapy (Done)       Point: Mobility training (Done)       Learning Progress Summary            Patient Acceptance, E, VU,DU by  at 6/20/2025 1422                      Point: Home exercise program (Done)       Learning Progress Summary            Patient Acceptance, E, VU,DU by  at 6/20/2025 1422                      Point: Body mechanics (Done)       Learning Progress Summary            Patient Acceptance, E, VU,DU by  at 6/20/2025 1422                      Point: Precautions (Done)       Learning Progress Summary            Patient Acceptance, E, VU,DU by  at 6/20/2025 1422                                      User Key       Initials Effective Dates Name Provider Type Discipline     09/06/24 -  Wilder Pineda, PT Physical Therapist PT                  PT Recommendation and Plan     Progress: no change  Outcome Evaluation: Pt is a 89 y.o. female admitted to [facility] with c/o abnormal urinary patterns on 6/17/2025. Prior to hospital admission, pt reports residing at home w/ granddaughter, daughter, and additional family members and 0 LEVY. Prior to hospital stay, pt was IADLs and utilized RWx AD at baseline. Pt noted w/ decreased R knee mobility due to elevated pain, noticeable swelling and slightly warm to touch upon assessment. Pt was UIC upon entry, required  increased encouragement to perform, Chris/CGA for STS transfers from bedside commode and bedside recliner, and Chris/CGA for ambulation with RWx for 10 ft. Pt presents today with below baseline strength, dec endurance, and decreased functional mobility. Pt will benefit from skilled PT to address the previous deficits and improve overall safety with functional mobility. Anticipate pt will D/C to SNF pending progress.     Time Calculation:         PT Charges       Row Name 06/20/25 1422             Time Calculation    Start Time 1038  -CS      Stop Time 1057  -CS      Time Calculation (min) 19 min  -CS      PT Received On 06/20/25  -CS      PT - Next Appointment 06/23/25  -CS      PT Goal Re-Cert Due Date 07/04/25  -CS         Time Calculation- PT    Total Timed Code Minutes- PT 19 minute(s)  -CS         Timed Charges    99752 - PT Therapeutic Activity Minutes 11  -CS         Untimed Charges    PT Eval/Re-eval Minutes 8  -CS         Total Minutes    Timed Charges Total Minutes 11  -CS      Untimed Charges Total Minutes 8  -CS       Total Minutes 19  -CS                User Key  (r) = Recorded By, (t) = Taken By, (c) = Cosigned By      Initials Name Provider Type    CS Wilder Pineda, PT Physical Therapist                  Therapy Charges for Today       Code Description Service Date Service Provider Modifiers Qty    96565602021 HC PT THERAPEUTIC ACT EA 15 MIN 6/20/2025 Wilder Pineda, PT GP 1    11057038215 HC PT EVAL MOD COMPLEXITY 2 6/20/2025 Wilder Pineda, PT GP 1            PT G-Codes  Outcome Measure Options: AM-PAC 6 Clicks Daily Activity (OT), Modified Collin  AM-PAC 6 Clicks Score (PT): 17  AM-PAC 6 Clicks Score (OT): 18  Modified [place] Scale: 4 - Moderately severe disability.  Unable to walk without assistance, and unable to attend to own bodily needs without assistance.  PT Discharge Summary  Anticipated Discharge Disposition (PT): skilled nursing facility    Wilder Pineda PT  6/20/2025

## 2025-06-20 NOTE — THERAPY TREATMENT NOTE
Patient Name: Candice Walker  : 1935    MRN: [id number]                              Today's Date: 2025       Admit Date: 2025    Visit Dx:     ICD-10-CM ICD-9-CM   1. Urinary tract infection without hematuria, site unspecified  N39.0 599.0   2. Hyponatremia  E87.1 276.1   3. PHUONG (acute kidney injury)  N17.9 584.9   4. Altered mental status, unspecified altered mental status type  R41.82 780.97     Patient Active Problem List   Diagnosis    S/p reverse total shoulder arthroplasty    Acute UTI (urinary tract infection)    Hyponatremia    PHUONG (acute kidney injury)    Hypomagnesemia    Hypertension    Anemia    COPD (chronic obstructive pulmonary disease)    Alcohol use     Past Medical History:   Diagnosis Date    Anemia     Anxiety     Arthritis     Cancer 2006    BREAST CANCER, RIGHT MASTECTOMY    Chronic bronchitis     Depression     Disease of thyroid gland     GERD (gastroesophageal reflux disease)     Hard of hearing     Heart murmur     History of kidney stones     History of UTI 2017    HAD ABX    Hyperlipidemia     Hypertension     MRSA (methicillin resistant Staphylococcus aureus) infection s    EARLY , HERE AT [facility], LEFT KNEE  , D/W GISELLE IN INFECTION CONTROL    RBBB     Risk factors for obstructive sleep apnea     Shoulder pain     LEFT     Past Surgical History:   Procedure Laterality Date    APPENDECTOMY      CATARACT EXTRACTION      WITH LENS IMPLANT    CHOLECYSTECTOMY      CYST REMOVAL      ON SPINE    EYE SURGERY      HYSTERECTOMY      JOINT REPLACEMENT      LT KNEE    MASTECTOMY Right 2006    MASTECTOMY RADICAL WITH AXILLARY NODE DISSECTION Right     THYROIDECTOMY      TOTAL SHOULDER ARTHROPLASTY W/ DISTAL CLAVICLE EXCISION Left 2017    Procedure: LT TOTAL SHOULDER REVERSE ARTHROPLASTY;  Surgeon: Ezequiel Elizalde MD;  Location: [facility] OR [facility];  Service:       General Information       Row Name 25 1522          OT Time and Intention    Document Type therapy note  (daily note)  -KR     Mode of Treatment co-treatment;physical therapy;occupational therapy  -KR     Patient Effort good  -KR       Row Name 06/20/25 1522          General Information    Existing Precautions/Restrictions fall  -KR       Row Name 06/20/25 1522          Cognition    Orientation Status (Cognition) oriented to;person;place;situation  -KR       Row Name 06/20/25 1522          Safety Issues/Impairments Affecting Functional Mobility    Impairments Affecting Function (Mobility) balance;endurance/activity tolerance;strength;pain;range of motion (ROM);grasp  -KR     Comment, Safety Issues/Impairments (Mobility) Gait belt and non-skid socks donned  -KR               User Key  (r) = Recorded By, (t) = Taken By, (c) = Cosigned By      Initials Name Provider Type    Ted Duron OT Occupational Therapist                     Mobility/ADL's       Row Name 06/20/25 1523          Bed Mobility    Comment, (Bed Mobility) UIC  -KR       Row Name 06/20/25 1523          Transfers    Transfers sit-stand transfer;toilet transfer  -KR       Row Name 06/20/25 1523          Sit-Stand Transfer    Sit-Stand Emmons (Transfers) contact guard;verbal cues;minimum assist (75% patient effort)  -KR     Assistive Device (Sit-Stand Transfers) walker, front-wheeled  -KR       Row Name 06/20/25 1523          Toilet Transfer    Type (Toilet Transfer) squat pivot;stand-sit  -KR     Emmons Level (Toilet Transfer) minimum assist (75% patient effort);verbal cues;nonverbal cues (demo/gesture)  -KR     Assistive Device (Toilet Transfer) walker, front-wheeled  -KR       Row Name 06/20/25 1523          Functional Mobility    Functional Mobility- Ind. Level contact guard assist;minimum assist (75% patient effort)  -KR     Functional Mobility- Device walker, front-wheeled  -KR     Functional Mobility- Comment pt with RW and able to walk to [facility] from chair with cues for safety  -KR     Patient was able to Ambulate yes  -KR       Row  Name 06/20/25 1523          Activities of Daily Living    BADL Assessment/Intervention toileting;grooming  -KR       Row Name 06/20/25 1523          Toileting Assessment/Training    Yellow Jacket Level (Toileting) toileting skills;adjust/manage clothing;perform perineal hygiene;maximum assist (25% patient effort)  -KR     Assistive Devices (Toileting) commode, bedside without drop arms  -KR     Position (Toileting) supported standing;supported sitting  -KR       Row Name 06/20/25 1523          Grooming Assessment/Training    Yellow Jacket Level (Grooming) maximum assist (25% patient effort);hair care, combing/brushing  -KR     Comment, (Grooming) 2/2 impaired grasp  -KR               User Key  (r) = Recorded By, (t) = Taken By, (c) = Cosigned By      Initials Name Provider Type    Ted Duron OT Occupational Therapist                   Obj/Interventions    No documentation.                  Goals/Plan    No documentation.                  Clinical Impression       Row Name 06/20/25 1533          Pain Assessment    Pre/Posttreatment Pain Comment pt with pain in hands and R knee  -KR       Row Name 06/20/25 1533          Plan of Care Review    Outcome Evaluation Pt was able to complete functional mobilit in room to [facility]. Pt req A for toileting and brushign hair.  Pt at this time cont to demo impaired endurance, weakness, and B hand deficitis impacting ADLs indicating the need for OT. Pt at this time would benefit from d/c to [facility]/SNF.  -KR       Row Name 06/20/25 1533          Therapy Assessment/Plan (OT)    Rehab Potential (OT) good  -KR     Criteria for Skilled Therapeutic Interventions Met (OT) yes  -KR     Therapy Frequency (OT) 5 times/wk  -KR       Row Name 06/20/25 1533          Therapy Plan Review/Discharge Plan (OT)    Anticipated Discharge Disposition (OT) skilled nursing facility;sub acute care setting  -KR       Row Name 06/20/25 1533          Positioning and Restraints    Pre-Treatment Position sitting  in chair/recliner  -KR     Post Treatment Position chair  -KR     In Chair notified nsg;sitting;call light within reach;encouraged to call for assist;exit alarm on  -KR               User Key  (r) = Recorded By, (t) = Taken By, (c) = Cosigned By      Initials Name Provider Type    Ted Duron OT Occupational Therapist                   Outcome Measures       Row Name 06/20/25 1536          How much help from another is currently needed...    Putting on and taking off regular lower body clothing? 3  -KR     Bathing (including washing, rinsing, and drying) 3  -KR     Toileting (which includes using toilet bed pan or urinal) 3  -KR     Putting on and taking off regular upper body clothing 3  -KR     Taking care of personal grooming (such as brushing teeth) 2  -KR     Eating meals 2  -KR     AM-PAC 6 Clicks Score (OT) 16  -KR       Row Name 06/20/25 1422 06/20/25 0955       How much help from another person do you currently need...    Turning from your back to your side while in flat bed without using bedrails? 3  -CS 3  -PS    Moving from lying on back to sitting on the side of a flat bed without bedrails? 3  -CS 3  -PS    Moving to and from a bed to a chair (including a wheelchair)? 3  -CS 3  -PS    Standing up from a chair using your arms (e.g., wheelchair, bedside chair)? 3  -CS 3  -PS    Climbing 3-5 steps with a railing? 2  -CS 2  -PS    To walk in hospital room? 3  -CS 3  -PS    AM-PAC 6 Clicks Score (PT) 17  -CS 17  -PS              User Key  (r) = Recorded By, (t) = Taken By, (c) = Cosigned By      Initials Name Provider Type    PS Sih Phan RN Registered Nurse    Wilder Hilton, CLARE Physical Therapist    Ted Duron OT Occupational Therapist                    Occupational Therapy Education       Title: PT OT SLP Therapies (Done)       Topic: Occupational Therapy (Done)       Point: ADL training (Done)       Learning Progress Summary            Patient Acceptance, E, VU by HE at 6/18/2025  0952    Comment: Pt educated on goals of session, role of OT, OT POC.                      Point: Home exercise program (Done)       Learning Progress Summary            Patient Acceptance, E, VU by HE at 6/18/2025 0952    Comment: Pt educated on goals of session, role of OT, OT POC.                      Point: Precautions (Done)       Learning Progress Summary            Patient Acceptance, E, VU by HE at 6/18/2025 0952    Comment: Pt educated on goals of session, role of OT, OT POC.                      Point: Body mechanics (Done)       Learning Progress Summary            Patient Acceptance, E, VU by HE at 6/18/2025 0952    Comment: Pt educated on goals of session, role of OT, OT POC.                                      User Key       Initials Effective Dates Name Provider Type Discipline    HE 02/18/25 -  Mamie Thomas OT Occupational Therapist OT                  OT Recommendation and Plan  Therapy Frequency (OT): 5 times/wk  Plan of Care Review  Outcome Evaluation: Pt was able to complete functional mobilit in room to [facility]. Pt req A for toileting and brushign hair.  Pt at this time cont to demo impaired endurance, weakness, and B hand deficitis impacting ADLs indicating the need for OT. Pt at this time would benefit from d/c to [facility]/SNF.     Time Calculation:         Time Calculation- OT       Row Name 06/20/25 1536             Time Calculation- OT    OT Start Time 1037  -KR      OT Stop Time 1100  -KR      OT Time Calculation (min) 23 min  -KR      Total Timed Code Minutes- OT 23 minute(s)  -KR      OT Received On 06/20/25  -KR      OT - Next Appointment 06/23/25  -KR         Timed Charges    33832 - OT Therapeutic Activity Minutes 12  -KR      10947 - OT Self Care/Mgmt Minutes 11  -KR         Total Minutes    Timed Charges Total Minutes 23  -KR       Total Minutes 23  -KR                User Key  (r) = Recorded By, (t) = Taken By, (c) = Cosigned By      Initials Name Provider Type    Ted Duron OT  Occupational Therapist                  Therapy Charges for Today       Code Description Service Date Service Provider Modifiers Qty    89266777314  OT THERAPEUTIC ACT EA 15 MIN 6/20/2025 Ted Montez OT GO 1    [id number]  OT SELF CARE/MGMT/TRAIN EA 15 MIN 6/20/2025 Ted Montez OT GO 1                 Ted Montez OT  6/20/2025

## 2025-06-20 NOTE — PROGRESS NOTES
Continued Stay Note  [facility]     Patient Name: Candice Walker  MRN: [id number]  Today's Date: 6/20/2025    Admit Date: 6/17/2025    Plan: SNF (referrals in [facility]/Pending)   Discharge Plan       Row Name 06/20/25 0822       Plan    Plan SNF (referrals in [facility]/Pending)    Plan Comments Spoke to patient who is in agreement to rehab at [place]. Referrals placed for [facility]. Informed Gemma with [facility] of referrals.                   Discharge Codes    No documentation.                 Expected Discharge Date and Time       Expected Discharge Date Expected Discharge Time    Jun 22, 2025               Mare Warner, RN

## 2025-06-21 LAB
ALBUMIN SERPL-MCNC: 3.1 G/DL (ref 3.5–5.2)
ANION GAP SERPL CALCULATED.3IONS-SCNC: 13 MMOL/L (ref 5–15)
BUN SERPL-MCNC: 16 MG/DL (ref 8–23)
BUN/CREAT SERPL: 18.6 (ref 7–25)
CALCIUM SPEC-SCNC: 9.3 MG/DL (ref 8.6–10.5)
CHLORIDE SERPL-SCNC: 94 MMOL/L (ref 98–107)
CO2 SERPL-SCNC: 22 MMOL/L (ref 22–29)
CREAT SERPL-MCNC: 0.86 MG/DL (ref 0.57–1)
EGFRCR SERPLBLD CKD-EPI 2021: 64.7 ML/MIN/1.73
GLUCOSE SERPL-MCNC: 96 MG/DL (ref 65–99)
PHOSPHATE SERPL-MCNC: 2.7 MG/DL (ref 2.5–4.5)
POTASSIUM SERPL-SCNC: 3.5 MMOL/L (ref 3.5–5.2)
SODIUM SERPL-SCNC: 129 MMOL/L (ref 136–145)

## 2025-06-21 PROCEDURE — 94799 UNLISTED PULMONARY SVC/PX: CPT

## 2025-06-21 PROCEDURE — 80069 RENAL FUNCTION PANEL: CPT | Performed by: INTERNAL MEDICINE

## 2025-06-21 PROCEDURE — 94760 N-INVAS EAR/PLS OXIMETRY 1: CPT

## 2025-06-21 PROCEDURE — 94761 N-INVAS EAR/PLS OXIMETRY MLT: CPT

## 2025-06-21 PROCEDURE — 94664 DEMO&/EVAL PT USE INHALER: CPT

## 2025-06-21 PROCEDURE — 94640 AIRWAY INHALATION TREATMENT: CPT

## 2025-06-21 RX ORDER — FLUTICASONE FUROATE AND VILANTEROL 200; 25 UG/1; UG/1
1 POWDER RESPIRATORY (INHALATION)
Status: DISCONTINUED | OUTPATIENT
Start: 2025-06-21 | End: 2025-06-25 | Stop reason: HOSPADM

## 2025-06-21 RX ADMIN — ALBUTEROL SULFATE 2.5 MG: 2.5 SOLUTION RESPIRATORY (INHALATION) at 02:39

## 2025-06-21 RX ADMIN — ALBUTEROL SULFATE 2.5 MG: 2.5 SOLUTION RESPIRATORY (INHALATION) at 08:50

## 2025-06-21 RX ADMIN — LEVOTHYROXINE SODIUM 125 MCG: 125 TABLET ORAL at 08:34

## 2025-06-21 RX ADMIN — ATORVASTATIN CALCIUM 10 MG: 20 TABLET, FILM COATED ORAL at 08:34

## 2025-06-21 RX ADMIN — PANTOPRAZOLE SODIUM 40 MG: 40 TABLET, DELAYED RELEASE ORAL at 05:29

## 2025-06-21 RX ADMIN — HYDROCODONE BITARTRATE AND ACETAMINOPHEN 1 TABLET: 5; 325 TABLET ORAL at 03:06

## 2025-06-21 RX ADMIN — VALSARTAN: 160 TABLET, FILM COATED ORAL at 21:05

## 2025-06-21 RX ADMIN — FERROUS SULFATE TAB 325 MG (65 MG ELEMENTAL FE) 325 MG: 325 (65 FE) TAB at 08:34

## 2025-06-21 RX ADMIN — HYDROCODONE BITARTRATE AND ACETAMINOPHEN 1 TABLET: 5; 325 TABLET ORAL at 12:01

## 2025-06-21 RX ADMIN — HYDROCODONE BITARTRATE AND ACETAMINOPHEN 1 TABLET: 5; 325 TABLET ORAL at 21:13

## 2025-06-21 RX ADMIN — NITROFURANTOIN MONOHYDRATE/MACROCRYSTALLINE 100 MG: 25; 75 CAPSULE ORAL at 08:33

## 2025-06-21 RX ADMIN — SERTRALINE HYDROCHLORIDE 100 MG: 100 TABLET, FILM COATED ORAL at 08:34

## 2025-06-21 RX ADMIN — FLUTICASONE FUROATE AND VILANTEROL 1 PUFF: 200; 25 POWDER RESPIRATORY (INHALATION) at 22:30

## 2025-06-21 RX ADMIN — NITROFURANTOIN MONOHYDRATE/MACROCRYSTALLINE 100 MG: 25; 75 CAPSULE ORAL at 21:05

## 2025-06-21 NOTE — PLAN OF CARE
Goal Outcome Evaluation:  Plan of Care Reviewed With: patient        Progress: improving  Outcome Evaluation: Alert and oriented. Afebrile, bradycardic. Norco given for generalized pain. No nausea .Ambulated in the light with gait belt and walker, assisted to BSC, voided. C/o SOB, RT informed, breathing TX given with good relief.  Patient refused Symbicort, wanted to used her own BREO. Remain on room air. Slept on and off.

## 2025-06-21 NOTE — PLAN OF CARE
Goal Outcome Evaluation:  Plan of Care Reviewed With: patient        Progress: no change  Outcome Evaluation: easily frustrated and irritable at times. cooperative.  not eating much, drinking fair.  norco x1 for c/o neck pain.  up to chair for short interval today.  up to BSC with assist.  c/o soa this am, improved after prn neb. refusing symbicort because that is not what she uses at home.  family brought in home suppy of Breo, med delivered to pharmacy for verification.  bed/chair alarm activated.

## 2025-06-21 NOTE — PROGRESS NOTES
Name: Candice Walker ADMIT: 2025   : 1935  PCP: Provider, No Known    MRN: [id number] LOS: 3 days   AGE/SEX: 89 y.o. female  ROOM: [facility]     Subjective   Subjective   Resting in bed, no new problems.    Objective   Objective   Vital Signs  Temp:  [97 °F (36.1 °C)-97.5 °F (36.4 °C)] 97.5 °F (36.4 °C)  Heart Rate:  [49-89] 51  Resp:  [18-20] 20  BP: (114-171)/(52-75) 124/55  SpO2:  [90 %-98 %] 94 %  on   ;   Device (Oxygen Therapy): room air  Body mass index is 25.88 kg/m².  Physical Exam  Constitutional:       General: She is not in acute distress.     Comments: Very hard of hearing   Pulmonary:      Effort: Pulmonary effort is normal. No respiratory distress.      Breath sounds: No stridor.   Skin:     Coloration: Skin is not jaundiced.      Findings: No bruising.   Neurological:      Mental Status: She is alert.         Results Review     I reviewed the patient's new clinical results.  Results from last 7 days   Lab Units 25  1210 25  0356 25   WBC 10*3/mm3 5.46 7.59 6.91   HEMOGLOBIN g/dL 8.4* 8.9* 9.6*   PLATELETS 10*3/mm3 221 250 270     Results from last 7 days   Lab Units 25  0339 25  0351 25  1210 25  0356   SODIUM mmol/L 129* 131* 127* 125*   POTASSIUM mmol/L 3.5 4.1 2.7* 3.0*   CHLORIDE mmol/L 94* 98 93* 92*   CO2 mmol/L 22.0 22.8 22.2 18.6*   BUN mg/dL 16.0 23.0 27.0* 38.0*   CREATININE mg/dL 0.86 0.86 1.03* 1.25*   GLUCOSE mg/dL 96 90 95 93   EGFR mL/min/1.73 64.7 64.7 52.1* 41.3*     Results from last 7 days   Lab Units 25  0339 25  0351 06/17/25  2125   ALBUMIN g/dL 3.1* 2.9* 3.5   BILIRUBIN mg/dL  --   --  0.4   ALK PHOS U/L  --   --  145*   AST (SGOT) U/L  --   --  14   ALT (SGPT) U/L  --   --  10     Results from last 7 days   Lab Units 25  0339 25  0351 25  1210 25  0356 255   CALCIUM mg/dL 9.3 9.1 8.6 8.3* 8.4*   ALBUMIN g/dL 3.1* 2.9*  --   --  3.5   MAGNESIUM mg/dL  --  2.3  --  2.3  "1.0*   PHOSPHORUS mg/dL 2.7 2.6  --   --   --        No results found for: \"HGBA1C\", \"POCGLU\"    No radiology results for the last day  Scheduled Medications  amLODIPine (NORVASC) 5 mg, valsartan (DIOVAN) 160 mg for EXFORGE 5-160, , Oral, Daily  atorvastatin, 10 mg, Oral, Daily  bisoprolol, 5 mg, Oral, Daily  budesonide-formoterol, 2 puff, Inhalation, BID - RT  ferrous sulfate, 325 mg, Oral, Daily  [Held by provider] furosemide, 40 mg, Oral, Daily  levothyroxine, 125 mcg, Oral, Daily  nitrofurantoin (macrocrystal-monohydrate), 100 mg, Oral, Q12H  pantoprazole, 40 mg, Oral, Q AM  sertraline, 100 mg, Oral, Daily    Infusions   Diet  Diet: Cardiac; Healthy Heart (2-3 Na+); Fluid Consistency: Thin (IDDSI 0)       Assessment/Plan     Active Hospital Problems    Diagnosis  POA    **PHUONG (acute kidney injury) [N17.9]  Yes    COPD (chronic obstructive pulmonary disease) [J44.9]  Yes    Alcohol use [F10.90]  Yes    Acute UTI (urinary tract infection) [N39.0]  Yes    Hyponatremia [E87.1]  Yes    Hypomagnesemia [E83.42]  Yes    Hypertension [I10]  Yes    Anemia [D64.9]  Yes      Resolved Hospital Problems   No resolved problems to display.       89 y.o. female admitted with PHUONG (acute kidney injury).      06/21/25  SNF pending, bed available on Monday.    Acute kidney injury, resolved  Hyponatremia  -Creatinine 1.47 OA (b/l ~0.9) > 0.8  -Nephrology following     Hypokalemia, hypomagnesemia, replete    Acute cystitis without hematuria  - Urinalysis nitrite positive, with 4+ bacteria, and 3-5 WBC  - Continue Macrobid      COPD  - Home bronchodilators     Alcohol use  - [facility] protocol     Anemia  -Hgb 9.6, previous Hgb 9.5 from 7/18/2024  - Home PO iron     Hypertension  -amlodipine, bisoprolol at home; Lasix ON HOLD  -plan to dc HCTZ permanently         DVT prophylaxis: SCDs  Discussed with patient and nursing staff.  Anticipated discharge SNF, once arrangements made            Main Taveras MD  [facility]  06/21/25  08:40 EDT

## 2025-06-21 NOTE — PROGRESS NOTES
"   LOS: 3 days     Chief Complaint/ Reason for encounter: Hyponatremia, PHUONG    Subjective   06/19/25 : Patient is doing  better today with no new complaints  Good appetite with no nausea or vomiting  No shortness of breath chest pain or edema  Voiding well with no dysuria    6/21 no new events         Medical history reviewed:  History of Present Illness    Subjective    History taken from: Chart and patient/family as able    Vital Signs  Temp:  [97 °F (36.1 °C)-97.5 °F (36.4 °C)] 97 °F (36.1 °C)  Heart Rate:  [49-89] 55  Resp:  [18-20] 18  BP: (114-171)/(52-75) 125/69       Wt Readings from Last 1 Encounters:   06/18/25 0038 64.2 kg (141 lb 8.6 oz)   06/17/25 2243 63.6 kg (140 lb 3.4 oz)       Objective:  Vital signs: (most recent): Blood pressure 125/69, pulse 55, temperature 97 °F (36.1 °C), temperature source Oral, resp. rate 18, height 157.5 cm (62.01\"), weight 64.2 kg (141 lb 8.6 oz), SpO2 97%.                Objective:  General Appearance:  Comfortable, chronically ill-appearing, no acute distress   HEENT: Mucous membranes moist, atraumatic  Lungs:  Normal effort and normal respiratory rate.  Breath sounds clear to auscultation: No rhonchi/Rales.  No  respiratory distress.   Heart:  S1, S2 normal.   Abdomen: Abdomen is soft, nontender/nondistended  Extremities: No significant edema of bilateral lower extremities  Skin:  Warm and dry       Results Review:    Intake/Output:     Intake/Output Summary (Last 24 hours) at 6/21/2025 1206  Last data filed at 6/21/2025 1040  Gross per 24 hour   Intake 630 ml   Output 350 ml   Net 280 ml         DATA:  Radiology and Labs:  The following labs independently reviewed by me. Additional labs ordered for tomorrow a.m.  Interval notes, chart personally reviewed by me.   Old records independently reviewed showing baseline creatinine around 1  The following radiologic studies independently viewed by me, findings recent CT angio with a IV protocol showed no PE, some cardiomegaly " and pulmonary edema  New problems include hypokalemia, hyponatremia  Discussed with patient himself at bedside    Risk/ complexity of medical care/ medical decision making moderate complexity, electrolyte management    Labs:   Recent Results (from the past 24 hours)   Renal Function Panel    Collection Time: 06/21/25  3:39 AM    Specimen: Blood   Result Value Ref Range    Glucose 96 65 - 99 mg/dL    BUN 16.0 8.0 - 23.0 mg/dL    Creatinine 0.86 0.57 - 1.00 mg/dL    Sodium 129 (L) 136 - 145 mmol/L    Potassium 3.5 3.5 - 5.2 mmol/L    Chloride 94 (L) 98 - 107 mmol/L    CO2 22.0 22.0 - 29.0 mmol/L    Calcium 9.3 8.6 - 10.5 mg/dL    Albumin 3.1 (L) 3.5 - 5.2 g/dL    Phosphorus 2.7 2.5 - 4.5 mg/dL    Anion Gap 13.0 5.0 - 15.0 mmol/L    BUN/Creatinine Ratio 18.6 7.0 - 25.0    eGFR 64.7 >60.0 mL/min/1.73       Radiology:  Pertinent radiology studies were reviewed as described above      Medications have been reviewed separately in chart review medication tab      ASSESSMENT:    Assessment & Plan  PHUONG, improved and nearing baseline  Hyponatremia, steady increase, correcting acceptable rate  Metabolic acidosis, improved  UTI   HTN, near goal for age  EtOH abdulaziz   Hypomagnesemia, improved  8. COPD.   9. Hypokalemia      Plan:  Cr stable at 0.86   Na at 129, which id down from 131   Continue to Monitor off IV fluids     Bp trending up   Continue on combination of amlodipine/valsartan at home dose   Stay off hctz indefinitely     Replace K prn per protocol   Mg at 2.3     Encourage nutrition and oral hydration  Continue abx.  Dosed for GFR 40-60        Jeny Victoria MD  [facility]   Office phone number: [phone]  Answering service phone number: [phone]    06/21/25  12:06 EDT    Dictation performed using Dragon dictation software

## 2025-06-22 LAB
ALBUMIN SERPL-MCNC: 2.8 G/DL (ref 3.5–5.2)
ANION GAP SERPL CALCULATED.3IONS-SCNC: 9 MMOL/L (ref 5–15)
BUN SERPL-MCNC: 14 MG/DL (ref 8–23)
BUN/CREAT SERPL: 14.6 (ref 7–25)
CALCIUM SPEC-SCNC: 9.1 MG/DL (ref 8.6–10.5)
CHLORIDE SERPL-SCNC: 97 MMOL/L (ref 98–107)
CO2 SERPL-SCNC: 24 MMOL/L (ref 22–29)
CREAT SERPL-MCNC: 0.96 MG/DL (ref 0.57–1)
EGFRCR SERPLBLD CKD-EPI 2021: 56.7 ML/MIN/1.73
GLUCOSE SERPL-MCNC: 84 MG/DL (ref 65–99)
PHOSPHATE SERPL-MCNC: 3.4 MG/DL (ref 2.5–4.5)
POTASSIUM SERPL-SCNC: 3.7 MMOL/L (ref 3.5–5.2)
SODIUM SERPL-SCNC: 130 MMOL/L (ref 136–145)

## 2025-06-22 PROCEDURE — 80069 RENAL FUNCTION PANEL: CPT | Performed by: INTERNAL MEDICINE

## 2025-06-22 RX ADMIN — HYDROCODONE BITARTRATE AND ACETAMINOPHEN 1 TABLET: 5; 325 TABLET ORAL at 23:03

## 2025-06-22 RX ADMIN — FERROUS SULFATE TAB 325 MG (65 MG ELEMENTAL FE) 325 MG: 325 (65 FE) TAB at 08:59

## 2025-06-22 RX ADMIN — BISOPROLOL FUMARATE 5 MG: 5 TABLET ORAL at 09:02

## 2025-06-22 RX ADMIN — ATORVASTATIN CALCIUM 10 MG: 20 TABLET, FILM COATED ORAL at 08:59

## 2025-06-22 RX ADMIN — DOCUSATE SODIUM 50 MG AND SENNOSIDES 8.6 MG 2 TABLET: 8.6; 5 TABLET, FILM COATED ORAL at 12:38

## 2025-06-22 RX ADMIN — HYDROCODONE BITARTRATE AND ACETAMINOPHEN 1 TABLET: 5; 325 TABLET ORAL at 01:18

## 2025-06-22 RX ADMIN — SERTRALINE HYDROCHLORIDE 100 MG: 100 TABLET, FILM COATED ORAL at 08:59

## 2025-06-22 RX ADMIN — LEVOTHYROXINE SODIUM 125 MCG: 125 TABLET ORAL at 08:59

## 2025-06-22 RX ADMIN — NITROFURANTOIN MONOHYDRATE/MACROCRYSTALLINE 100 MG: 25; 75 CAPSULE ORAL at 08:59

## 2025-06-22 RX ADMIN — HYDROCODONE BITARTRATE AND ACETAMINOPHEN 1 TABLET: 5; 325 TABLET ORAL at 16:08

## 2025-06-22 RX ADMIN — PANTOPRAZOLE SODIUM 40 MG: 40 TABLET, DELAYED RELEASE ORAL at 06:40

## 2025-06-22 NOTE — PROGRESS NOTES
"   LOS: 4 days     Chief Complaint/ Reason for encounter: Hyponatremia, PHUONG    Subjective   06/19/25 : Patient is doing  better today with no new complaints  Good appetite with no nausea or vomiting  No shortness of breath chest pain or edema  Voiding well with no dysuria    6/21 no new events     6/22: She feels well denies complaints    Medical history reviewed:  History of Present Illness    Subjective    History taken from: Chart and patient/family as able    Vital Signs  Temp:  [96.7 °F (35.9 °C)-98.2 °F (36.8 °C)] 96.7 °F (35.9 °C)  Heart Rate:  [55-60] 55  Resp:  [18-20] 18  BP: (134-151)/(65-71) 151/65       Wt Readings from Last 1 Encounters:   06/18/25 0038 64.2 kg (141 lb 8.6 oz)   06/17/25 2243 63.6 kg (140 lb 3.4 oz)       Objective:  Vital signs: (most recent): Blood pressure 151/65, pulse 55, temperature 96.7 °F (35.9 °C), temperature source Oral, resp. rate 18, height 157.5 cm (62.01\"), weight 64.2 kg (141 lb 8.6 oz), SpO2 94%.              Objective:  General Appearance:  Comfortable, chronically ill-appearing, no acute distress   HEENT: Mucous membranes moist, atraumatic  Lungs:  Normal effort and normal respiratory rate.  Breath sounds clear to auscultation: No rhonchi/Rales.  No  respiratory distress.   Heart:  S1, S2 normal.   Abdomen: Abdomen is soft, nontender/nondistended  Extremities: No significant edema of bilateral lower extremities  Skin:  Warm and dry       Results Review:    Intake/Output:     Intake/Output Summary (Last 24 hours) at 6/22/2025 1106  Last data filed at 6/22/2025 1016  Gross per 24 hour   Intake 520 ml   Output 600 ml   Net -80 ml         DATA:  Radiology and Labs:  The following labs independently reviewed by me. Additional labs ordered for tomorrow a.m.  Interval notes, chart personally reviewed by me.   Old records independently reviewed showing baseline creatinine around 1  Discussed with patient himself at bedside    Risk/ complexity of medical care/ medical " decision making moderate complexity, electrolyte management    Labs:   Recent Results (from the past 24 hours)   Renal Function Panel    Collection Time: 06/22/25  5:26 AM    Specimen: Blood   Result Value Ref Range    Glucose 84 65 - 99 mg/dL    BUN 14.0 8.0 - 23.0 mg/dL    Creatinine 0.96 0.57 - 1.00 mg/dL    Sodium 130 (L) 136 - 145 mmol/L    Potassium 3.7 3.5 - 5.2 mmol/L    Chloride 97 (L) 98 - 107 mmol/L    CO2 24.0 22.0 - 29.0 mmol/L    Calcium 9.1 8.6 - 10.5 mg/dL    Albumin 2.8 (L) 3.5 - 5.2 g/dL    Phosphorus 3.4 2.5 - 4.5 mg/dL    Anion Gap 9.0 5.0 - 15.0 mmol/L    BUN/Creatinine Ratio 14.6 7.0 - 25.0    eGFR 56.7 (L) >60.0 mL/min/1.73       Radiology:  Pertinent radiology studies were reviewed as described above      Medications have been reviewed separately in chart review medication tab      ASSESSMENT:    Assessment & Plan  PHUONG, improved and nearing baseline  Hyponatremia, steady increase, correcting acceptable rate  Metabolic acidosis, improved  UTI   HTN, near goal for age  EtOH abdulaziz   Hypomagnesemia, improved  8. COPD.   9. Hypokalemia      Plan:  Cr stable at 0.86   Na maintaining around 130, euvolemic  Continue to Monitor off IV fluids     Bp trending up   Continue on combination of amlodipine/valsartan at home dose   BP acceptable for age  Stay off hctz indefinitely     Replace K prn per protocol, as needed  Mg at goal    Encourage nutrition and oral hydration  Continue abx.  Dosed for GFR 40-60        Leonides Victoria MD  [facility]   Office phone number: [phone]  Answering service phone number: [phone]    06/22/25  11:06 EDT    Dictation performed using Dragon dictation software

## 2025-06-22 NOTE — PLAN OF CARE
Goal Outcome Evaluation:           Progress: no change  Outcome Evaluation: VSS. norco for pain x 1 for neck and knees. up to BSC with assist x 2. voiding small amounts of urine, bladder scanned at 250. passing flatus no stool. started bowel regimen. SOA on exertion. RA. sat 90's. up to chair. Pt argumentative and irritated throughout shift. encouraged verbalization of feelings and respectful boundaries with staff. poor oral intake. turning as tolerated. pt updated on plan of care.

## 2025-06-22 NOTE — PLAN OF CARE
Goal Outcome Evaluation:  Plan of Care Reviewed With: patient        Progress: no change  Outcome Evaluation: vss, norco for pain, up with assist to BSC, bed alarm for safety, SOA on exertion but refusing oxygen, 02 on the low 90's, continue to monitor the pt.

## 2025-06-22 NOTE — PROGRESS NOTES
Name: Candice Walker ADMIT: 2025   : 1935  PCP: Provider, No Known    MRN: [id number] LOS: 4 days   AGE/SEX: 89 y.o. female  ROOM: On license of [facility]     Subjective   Subjective   Resting in bed, no new problems.    Objective   Objective   Vital Signs  Temp:  [97 °F (36.1 °C)-98.2 °F (36.8 °C)] 97 °F (36.1 °C)  Heart Rate:  [55-60] 60  Resp:  [18-20] 18  BP: (125-137)/(66-71) 134/71  SpO2:  [90 %-97 %] 90 %  on   ;   Device (Oxygen Therapy): room air  Body mass index is 25.88 kg/m².  Physical Exam  Constitutional:       General: She is not in acute distress.     Comments: Very hard of hearing   Pulmonary:      Effort: Pulmonary effort is normal. No respiratory distress.      Breath sounds: No stridor.   Skin:     Coloration: Skin is not jaundiced.      Findings: No bruising.   Neurological:      Mental Status: She is alert.         Results Review     I reviewed the patient's new clinical results.  Results from last 7 days   Lab Units 25  1210 25  0356 25   WBC 10*3/mm3 5.46 7.59 6.91   HEMOGLOBIN g/dL 8.4* 8.9* 9.6*   PLATELETS 10*3/mm3 221 250 270     Results from last 7 days   Lab Units 25  0526 25  0339 25  03525  1210   SODIUM mmol/L 130* 129* 131* 127*   POTASSIUM mmol/L 3.7 3.5 4.1 2.7*   CHLORIDE mmol/L 97* 94* 98 93*   CO2 mmol/L 24.0 22.0 22.8 22.2   BUN mg/dL 14.0 16.0 23.0 27.0*   CREATININE mg/dL 0.96 0.86 0.86 1.03*   GLUCOSE mg/dL 84 96 90 95   EGFR mL/min/1.73 56.7* 64.7 64.7 52.1*     Results from last 7 days   Lab Units 25  0526 25  0339 25  03525  2125   ALBUMIN g/dL 2.8* 3.1* 2.9* 3.5   BILIRUBIN mg/dL  --   --   --  0.4   ALK PHOS U/L  --   --   --  145*   AST (SGOT) U/L  --   --   --  14   ALT (SGPT) U/L  --   --   --  10     Results from last 7 days   Lab Units 25  0526 25  0339 25  0351 25  1210 25  0356 25  2125   CALCIUM mg/dL 9.1 9.3 9.1 8.6 8.3* 8.4*   ALBUMIN g/dL 2.8* 3.1*  "2.9*  --   --  3.5   MAGNESIUM mg/dL  --   --  2.3  --  2.3 1.0*   PHOSPHORUS mg/dL 3.4 2.7 2.6  --   --   --        No results found for: \"HGBA1C\", \"POCGLU\"    No radiology results for the last day  Scheduled Medications  amLODIPine (NORVASC) 5 mg, valsartan (DIOVAN) 160 mg for EXFORGE 5-160, , Oral, Daily  atorvastatin, 10 mg, Oral, Daily  bisoprolol, 5 mg, Oral, Daily  ferrous sulfate, 325 mg, Oral, Daily  Fluticasone Furoate-Vilanterol, 1 puff, Inhalation, Daily - RT  [Held by provider] furosemide, 40 mg, Oral, Daily  levothyroxine, 125 mcg, Oral, Daily  nitrofurantoin (macrocrystal-monohydrate), 100 mg, Oral, Q12H  pantoprazole, 40 mg, Oral, Q AM  sertraline, 100 mg, Oral, Daily    Infusions   Diet  Diet: Cardiac; Healthy Heart (2-3 Na+); Fluid Consistency: Thin (IDDSI 0)       Assessment/Plan     Active Hospital Problems    Diagnosis  POA    **PHUONG (acute kidney injury) [N17.9]  Yes    COPD (chronic obstructive pulmonary disease) [J44.9]  Yes    Alcohol use [F10.90]  Yes    Acute UTI (urinary tract infection) [N39.0]  Yes    Hyponatremia [E87.1]  Yes    Hypomagnesemia [E83.42]  Yes    Hypertension [I10]  Yes    Anemia [D64.9]  Yes      Resolved Hospital Problems   No resolved problems to display.       89 y.o. female admitted with PHUONG (acute kidney injury).      06/22/25  Medically ready for discharge, awaiting SNF.     Acute kidney injury, resolved  Hyponatremia  -Creatinine 1.47 OA (b/l ~0.9) > 0.8  -Nephrology following     Hypokalemia, hypomagnesemia, replete    Acute cystitis without hematuria  - Urinalysis nitrite positive, with 4+ bacteria, and 3-5 WBC  - Continue Macrobid x5d      COPD  - Home bronchodilators     Alcohol use  - [facility] protocol     Anemia  -Hgb 9.6, previous Hgb 9.5 from 7/18/2024  - Home PO iron     Hypertension  -amlodipine, bisoprolol at home; Lasix ON HOLD  -plan to dc HCTZ permanently         DVT prophylaxis: SCDs  Discussed with patient.  Anticipated discharge SNF, once arrangements " made            Main Taveras MD  [facility]  06/22/25  09:04 EDT

## 2025-06-23 ENCOUNTER — APPOINTMENT (OUTPATIENT)
Dept: MRI IMAGING | Facility: HOSPITAL | Age: OVER 89
End: 2025-06-23
Payer: MEDICARE

## 2025-06-23 ENCOUNTER — APPOINTMENT (OUTPATIENT)
Dept: GENERAL RADIOLOGY | Facility: HOSPITAL | Age: OVER 89
End: 2025-06-23
Payer: MEDICARE

## 2025-06-23 LAB
ALBUMIN SERPL-MCNC: 2.7 G/DL (ref 3.5–5.2)
ANION GAP SERPL CALCULATED.3IONS-SCNC: 11.1 MMOL/L (ref 5–15)
BUN SERPL-MCNC: 13 MG/DL (ref 8–23)
BUN/CREAT SERPL: 14.9 (ref 7–25)
CALCIUM SPEC-SCNC: 9.3 MG/DL (ref 8.6–10.5)
CHLORIDE SERPL-SCNC: 97 MMOL/L (ref 98–107)
CO2 SERPL-SCNC: 22.9 MMOL/L (ref 22–29)
CREAT SERPL-MCNC: 0.87 MG/DL (ref 0.57–1)
EGFRCR SERPLBLD CKD-EPI 2021: 63.8 ML/MIN/1.73
GLUCOSE SERPL-MCNC: 76 MG/DL (ref 65–99)
PHOSPHATE SERPL-MCNC: 3.3 MG/DL (ref 2.5–4.5)
POTASSIUM SERPL-SCNC: 3.8 MMOL/L (ref 3.5–5.2)
SODIUM SERPL-SCNC: 131 MMOL/L (ref 136–145)

## 2025-06-23 PROCEDURE — 97110 THERAPEUTIC EXERCISES: CPT

## 2025-06-23 PROCEDURE — 72141 MRI NECK SPINE W/O DYE: CPT

## 2025-06-23 PROCEDURE — 73120 X-RAY EXAM OF HAND: CPT

## 2025-06-23 PROCEDURE — 70551 MRI BRAIN STEM W/O DYE: CPT

## 2025-06-23 PROCEDURE — 94799 UNLISTED PULMONARY SVC/PX: CPT

## 2025-06-23 PROCEDURE — 25010000002 METHYLPREDNISOLONE PER 125 MG: Performed by: INTERNAL MEDICINE

## 2025-06-23 PROCEDURE — 80069 RENAL FUNCTION PANEL: CPT | Performed by: INTERNAL MEDICINE

## 2025-06-23 RX ORDER — FOLIC ACID 1 MG/1
1 TABLET ORAL DAILY
Status: DISCONTINUED | OUTPATIENT
Start: 2025-06-23 | End: 2025-06-25 | Stop reason: HOSPADM

## 2025-06-23 RX ORDER — PREDNISONE 20 MG/1
40 TABLET ORAL
Status: DISCONTINUED | OUTPATIENT
Start: 2025-06-24 | End: 2025-06-25 | Stop reason: HOSPADM

## 2025-06-23 RX ORDER — METHYLPREDNISOLONE SODIUM SUCCINATE 125 MG/2ML
60 INJECTION, POWDER, LYOPHILIZED, FOR SOLUTION INTRAMUSCULAR; INTRAVENOUS ONCE
Status: COMPLETED | OUTPATIENT
Start: 2025-06-23 | End: 2025-06-23

## 2025-06-23 RX ADMIN — FOLIC ACID 1 MG: 1 TABLET ORAL at 16:07

## 2025-06-23 RX ADMIN — FLUTICASONE FUROATE AND VILANTEROL 1 PUFF: 200; 25 POWDER RESPIRATORY (INHALATION) at 22:00

## 2025-06-23 RX ADMIN — HYDROCODONE BITARTRATE AND ACETAMINOPHEN 1 TABLET: 5; 325 TABLET ORAL at 21:31

## 2025-06-23 RX ADMIN — ATORVASTATIN CALCIUM 10 MG: 20 TABLET, FILM COATED ORAL at 09:07

## 2025-06-23 RX ADMIN — NITROFURANTOIN MONOHYDRATE/MACROCRYSTALLINE 100 MG: 25; 75 CAPSULE ORAL at 21:33

## 2025-06-23 RX ADMIN — METHYLPREDNISOLONE SODIUM SUCCINATE 60 MG: 125 INJECTION, POWDER, FOR SOLUTION INTRAMUSCULAR; INTRAVENOUS at 16:05

## 2025-06-23 RX ADMIN — LEVOTHYROXINE SODIUM 125 MCG: 125 TABLET ORAL at 09:08

## 2025-06-23 RX ADMIN — NITROFURANTOIN MONOHYDRATE/MACROCRYSTALLINE 100 MG: 25; 75 CAPSULE ORAL at 09:08

## 2025-06-23 RX ADMIN — SERTRALINE HYDROCHLORIDE 100 MG: 100 TABLET, FILM COATED ORAL at 09:08

## 2025-06-23 RX ADMIN — Medication 100 MG: at 16:07

## 2025-06-23 RX ADMIN — BISOPROLOL FUMARATE 5 MG: 5 TABLET ORAL at 09:07

## 2025-06-23 RX ADMIN — FERROUS SULFATE TAB 325 MG (65 MG ELEMENTAL FE) 325 MG: 325 (65 FE) TAB at 09:07

## 2025-06-23 RX ADMIN — VALSARTAN: 160 TABLET, FILM COATED ORAL at 21:33

## 2025-06-23 NOTE — THERAPY TREATMENT NOTE
Patient Name: Candice Walker  : 1935    MRN: [id number]                              Today's Date: 2025       Admit Date: 2025    Visit Dx:     ICD-10-CM ICD-9-CM   1. Urinary tract infection without hematuria, site unspecified  N39.0 599.0   2. Hyponatremia  E87.1 276.1   3. PHUONG (acute kidney injury)  N17.9 584.9   4. Altered mental status, unspecified altered mental status type  R41.82 780.97     Patient Active Problem List   Diagnosis    S/p reverse total shoulder arthroplasty    Acute UTI (urinary tract infection)    Hyponatremia    PHUONG (acute kidney injury)    Hypomagnesemia    Hypertension    Anemia    COPD (chronic obstructive pulmonary disease)    Alcohol use     Past Medical History:   Diagnosis Date    Anemia     Anxiety     Arthritis     Cancer 2006    BREAST CANCER, RIGHT MASTECTOMY    Chronic bronchitis     Depression     Disease of thyroid gland     GERD (gastroesophageal reflux disease)     Hard of hearing     Heart murmur     History of kidney stones     History of UTI 2017    HAD ABX    Hyperlipidemia     Hypertension     MRSA (methicillin resistant Staphylococcus aureus) infection s    EARLY , HERE AT [facility], LEFT KNEE  , D/W GISELLE IN INFECTION CONTROL    RBBB     Risk factors for obstructive sleep apnea     Shoulder pain     LEFT     Past Surgical History:   Procedure Laterality Date    APPENDECTOMY      CATARACT EXTRACTION      WITH LENS IMPLANT    CHOLECYSTECTOMY      CYST REMOVAL      ON SPINE    EYE SURGERY      HYSTERECTOMY      JOINT REPLACEMENT      LT KNEE    MASTECTOMY Right 2006    MASTECTOMY RADICAL WITH AXILLARY NODE DISSECTION Right     THYROIDECTOMY      TOTAL SHOULDER ARTHROPLASTY W/ DISTAL CLAVICLE EXCISION Left 2017    Procedure: LT TOTAL SHOULDER REVERSE ARTHROPLASTY;  Surgeon: Ezequiel Elizalde MD;  Location: [facility] OR [facility];  Service:       General Information       Row Name 25 1424          Physical Therapy Time and Intention    Document Type  therapy note (daily note)  -CS     Mode of Treatment individual therapy;physical therapy  -CS       Row Name 06/23/25 1424          General Information    Patient Profile Reviewed yes  -CS     Existing Precautions/Restrictions fall  -CS       Row Name 06/23/25 1424          Cognition    Orientation Status (Cognition) oriented to;person;place;situation  -CS       Row Name 06/23/25 1424          Safety Issues/Impairments Affecting Functional Mobility    Safety Issues Affecting Function (Mobility) ability to follow commands  -CS     Impairments Affecting Function (Mobility) balance;endurance/activity tolerance;pain;grasp;strength;range of motion (ROM)  -CS               User Key  (r) = Recorded By, (t) = Taken By, (c) = Cosigned By      Initials Name Provider Type    CS Wilder Pineda PT Physical Therapist                   Mobility       Row Name 06/23/25 1424          Bed Mobility    Comment, (Bed Mobility) pt declined  -CS       Row Name 06/23/25 1424          Bed-Chair Transfer    Bed-Chair [place] (Transfers) not tested  -CS       Row Name 06/23/25 1424          Sit-Stand Transfer    Sit-Stand [place] (Transfers) not tested  -CS       Row Name 06/23/25 1424          Gait/Stairs (Locomotion)    Patient was able to Ambulate no, other medical factors prevent ambulation  -CS     Reason Patient was unable to Ambulate Other (Comment)  Pt refusal  -CS               User Key  (r) = Recorded By, (t) = Taken By, (c) = Cosigned By      Initials Name Provider Type    Wilder Hilton PT Physical Therapist                   Obj/Interventions       Row Name 06/23/25 1427          Strength Comprehensive (MMT)    Comment, General Manual Muscle Testing (MMT) Assessment Due to increased pain this date pt demonstrates 2/5 for all LE  -CS       Row Name 06/23/25 1427          Motor Skills    Therapeutic Exercise --  Scap retraction x5, digit flexion x25, wrist extension x 6, bicep curl x 4, APs x8, PROM of ankles and wrist  extension. All exercises limited due to pain.  -CS       Row Name 06/23/25 1427          Balance    Static Sitting Balance supervision  -CS     Dynamic Sitting Balance supervision  -CS               User Key  (r) = Recorded By, (t) = Taken By, (c) = Cosigned By      Initials Name Provider Type    Wilder Hilton, PT Physical Therapist                   Goals/Plan    No documentation.                  Clinical Impression       Row Name 06/23/25 1429          Pain    Pain Management Interventions activity modification encouraged  -CS     Response to Pain Interventions activity participation with increased pain  -CS     Pre/Posttreatment Pain Comment Pain in R shoulder, R knee, and B hands  -CS     Additional Documentation Pain Scale: FACES Pre/Post-Treatment (Group)  -CS       Row Name 06/23/25 1429          Pain Scale: FACES Pre/Post-Treatment    Pain: FACES Scale, Pretreatment 0-->no hurt  -CS     Posttreatment Pain Rating 6-->hurts even more  -CS       Row Name 06/23/25 1429          Plan of Care Review    Plan of Care Reviewed With patient  -CS     Progress no change  -CS     Outcome Evaluation Pt initially refused PT tx session as she states she has too much going on and is in too much pain, refused to provide numerical value for her pain in her R knee, R shoulder, and B hands. Spent time educated pt on her need for mobility in order to progress but she refuses to participate in any mobility that requires using her LEs. Able to provide pt w/ gentle AROM of her UEs but all exercises provided she yelps in pain and grabs her R shoulder, only able to tolerate digit flexion. Performed APs w/out much pain but after her 8th rep she reports too much pain to continue. Performed PROM of ankles to perform inversion, eversion, DF, and PF but pt continues to state too much pain. Tolerated scap retractions this date fair for 5 reps. Unable to progress due to increased pain and refusal of mobility. Pt reports she does not want  to go to rehab and would like to return home, pt appears unsafe to return home at this time.  -CS       Row Name 06/23/25 1429          Therapy Assessment/Plan (PT)    Rehab Potential (PT) fair  -CS     Criteria for Skilled Interventions Met (PT) yes  -CS     Therapy Frequency (PT) 5 times/wk  -CS       Row Name 06/23/25 1429          Vital Signs    O2 Delivery Pre Treatment room air  -CS     O2 Delivery Intra Treatment room air  -CS     O2 Delivery Post Treatment room air  -CS     Pre Patient Position Sitting  -CS     Intra Patient Position Sitting  -CS     Post Patient Position Sitting  -CS       Row Name 06/23/25 1429          Positioning and Restraints    Pre-Treatment Position sitting in chair/recliner  -CS     Post Treatment Position chair  -CS     In Chair reclined;call light within reach;encouraged to call for assist;legs elevated;notified nsg  -CS               User Key  (r) = Recorded By, (t) = Taken By, (c) = Cosigned By      Initials Name Provider Type    Wilder Hilton PT Physical Therapist                   Outcome Measures       Row Name 06/23/25 1430 06/23/25 0900       How much help from another person do you currently need...    Turning from your back to your side while in flat bed without using bedrails? 3  -CS 3  -MB    Moving from lying on back to sitting on the side of a flat bed without bedrails? 3  -CS 3  -MB    Moving to and from a bed to a chair (including a wheelchair)? 2  -CS 3  -MB    Standing up from a chair using your arms (e.g., wheelchair, bedside chair)? 2  -CS 3  -MB    Climbing 3-5 steps with a railing? 2  -CS 2  -MB    To walk in hospital room? 2  -CS 2  -MB    AM-PAC 6 Clicks Score (PT) 14  -CS 16  -MB              User Key  (r) = Recorded By, (t) = Taken By, (c) = Cosigned By      Initials Name Provider Type    Nichole Altamirano, RN Registered Nurse    Wilder Hilton PT Physical Therapist                                 Physical Therapy Education       Title: PT OT SLP  Therapies (In Progress)       Topic: Physical Therapy (In Progress)       Point: Mobility training (In Progress)       Learning Progress Summary            Patient Nonacceptance, E, NR by  at 6/23/2025 1430    Acceptance, E, VU,DU by CS at 6/20/2025 1422                      Point: Home exercise program (In Progress)       Learning Progress Summary            Patient Nonacceptance, E, NR by CS at 6/23/2025 1430    Acceptance, E, VU,DU by CS at 6/20/2025 1422                      Point: Body mechanics (Done)       Learning Progress Summary            Patient Acceptance, E, VU,DU by CS at 6/20/2025 1422                      Point: Precautions (Done)       Learning Progress Summary            Patient Acceptance, E, VU,DU by CS at 6/20/2025 1422                                      User Key       Initials Effective Dates Name Provider Type Discipline     09/06/24 -  Wilder Pineda, PT Physical Therapist PT                  PT Recommendation and Plan     Progress: no change  Outcome Evaluation: Pt initially refused PT tx session as she states she has too much going on and is in too much pain, refused to provide numerical value for her pain in her R knee, R shoulder, and B hands. Spent time educated pt on her need for mobility in order to progress but she refuses to participate in any mobility that requires using her LEs. Able to provide pt w/ gentle AROM of her UEs but all exercises provided she yelps in pain and grabs her R shoulder, only able to tolerate digit flexion. Performed APs w/out much pain but after her 8th rep she reports too much pain to continue. Performed PROM of ankles to perform inversion, eversion, DF, and PF but pt continues to state too much pain. Tolerated scap retractions this date fair for 5 reps. Unable to progress due to increased pain and refusal of mobility. Pt reports she does not want to go to rehab and would like to return home, pt appears unsafe to return home at this time.     Time  Calculation:         PT Charges       Row Name 06/23/25 1430             Time Calculation    Start Time 1411  -CS      Stop Time 1421  -CS      Time Calculation (min) 10 min  -CS      PT Received On 06/23/25  -CS      PT - Next Appointment 06/24/25  -CS      PT Goal Re-Cert Due Date 07/04/25  -CS         Time Calculation- PT    Total Timed Code Minutes- PT 10 minute(s)  -CS         Timed Charges    74097 - PT Therapeutic Exercise Minutes 10  -CS         Total Minutes    Timed Charges Total Minutes 10  -CS       Total Minutes 10  -CS                User Key  (r) = Recorded By, (t) = Taken By, (c) = Cosigned By      Initials Name Provider Type    CS Wilder Pineda, PT Physical Therapist                  Therapy Charges for Today       Code Description Service Date Service Provider Modifiers Qty    51677102264 HC PT THER PROC EA 15 MIN 6/23/2025 Wilder Pineda, PT GP 1            PT G-Codes  Outcome Measure Options: AM-PAC 6 Clicks Daily Activity (OT), Modified Romeo  AM-PAC 6 Clicks Score (PT): 14  AM-PAC 6 Clicks Score (OT): 16  Modified Romeo Scale: 4 - Moderately severe disability.  Unable to walk without assistance, and unable to attend to own bodily needs without assistance.  PT Discharge Summary  Anticipated Discharge Disposition (PT): skilled nursing facility    Wilder Pineda PT  6/23/2025

## 2025-06-23 NOTE — THERAPY TREATMENT NOTE
Patient Name: Candice Walker  : 1935    MRN: [id number]                              Today's Date: 2025       Admit Date: 2025    Visit Dx:     ICD-10-CM ICD-9-CM   1. Urinary tract infection without hematuria, site unspecified  N39.0 599.0   2. Hyponatremia  E87.1 276.1   3. PHUONG (acute kidney injury)  N17.9 584.9   4. Altered mental status, unspecified altered mental status type  R41.82 780.97     Patient Active Problem List   Diagnosis    S/p reverse total shoulder arthroplasty    Acute UTI (urinary tract infection)    Hyponatremia    PHUONG (acute kidney injury)    Hypomagnesemia    Hypertension    Anemia    COPD (chronic obstructive pulmonary disease)    Alcohol use     Past Medical History:   Diagnosis Date    Anemia     Anxiety     Arthritis     Cancer 2006    BREAST CANCER, RIGHT MASTECTOMY    Chronic bronchitis     Depression     Disease of thyroid gland     GERD (gastroesophageal reflux disease)     Hard of hearing     Heart murmur     History of kidney stones     History of UTI 2017    HAD ABX    Hyperlipidemia     Hypertension     MRSA (methicillin resistant Staphylococcus aureus) infection s    EARLY , HERE AT [facility], LEFT KNEE  , D/W GISELLE IN INFECTION CONTROL    RBBB     Risk factors for obstructive sleep apnea     Shoulder pain     LEFT     Past Surgical History:   Procedure Laterality Date    APPENDECTOMY      CATARACT EXTRACTION      WITH LENS IMPLANT    CHOLECYSTECTOMY      CYST REMOVAL      ON SPINE    EYE SURGERY      HYSTERECTOMY      JOINT REPLACEMENT      LT KNEE    MASTECTOMY Right 2006    MASTECTOMY RADICAL WITH AXILLARY NODE DISSECTION Right     THYROIDECTOMY      TOTAL SHOULDER ARTHROPLASTY W/ DISTAL CLAVICLE EXCISION Left 2017    Procedure: LT TOTAL SHOULDER REVERSE ARTHROPLASTY;  Surgeon: Ezequiel Elizalde MD;  [facility] [facility] [place]  Service:       General Information       Row Name 25 1250          OT Time and Intention    Subjective Information  complains of;weakness;fatigue;pain  -KG     Document Type therapy note (daily note)  -KG     Mode of Treatment individual therapy;occupational therapy  -KG     Patient Effort fair  -KG     Symptoms Noted During/After Treatment none  -KG     Comment Pt declined OOB or EOB activity despite multiple attempts and encouragement by OT. Agreeable to bed level therex w/ encouragement  -KG       Row Name 06/23/25 1250          General Information    Patient Profile Reviewed yes  -KG     Existing Precautions/Restrictions fall  -KG       Row Name 06/23/25 1250          Safety Issues/Impairments Affecting Functional Mobility    Impairments Affecting Function (Mobility) balance;endurance/activity tolerance;pain;grasp;strength;range of motion (ROM)  -KG               User Key  (r) = Recorded By, (t) = Taken By, (c) = Cosigned By      Initials Name Provider Type    Marko Chakraborty OT Occupational Therapist                     Mobility/ADL's       Row Name 06/23/25 1251          Bed Mobility    Comment, (Bed Mobility) pt declined  -KG       Row Name 06/23/25 1251          Transfers    Comment, (Transfers) pt declined  -KG       Row Name 06/23/25 1251          Functional Mobility    Functional Mobility- Comment pt declined  -KG       Row Name 06/23/25 1251          Activities of Daily Living    BADL Assessment/Intervention --  Pt declined ADL participation this date. Encouraged bed level ADLs to address BUE strength/FM deficits, however pt continued to refuse.  -KG               User Key  (r) = Recorded By, (t) = Taken By, (c) = Cosigned By      Initials Name Provider Type    Marko Chakraborty OT Occupational Therapist                   Obj/Interventions       Row Name 06/23/25 1252          Shoulder (Therapeutic Exercise)    Shoulder (Therapeutic Exercise) AROM (active range of motion);AAROM (active assistive range of motion)  -KG     Shoulder AROM (Therapeutic Exercise) bilateral;left;flexion;10 repetitions;extension  -KG      Shoulder AAROM (Therapeutic Exercise) right;bilateral;flexion;10 repetitions  -KG       Row Name 06/23/25 1252          Elbow/Forearm (Therapeutic Exercise)    Elbow/Forearm (Therapeutic Exercise) AROM (active range of motion)  -KG     Elbow/Forearm AROM (Therapeutic Exercise) bilateral;flexion;extension;10 repetitions  -KG       Row Name 06/23/25 1252          Wrist (Therapeutic Exercise)    Wrist (Therapeutic Exercise) AROM (active range of motion);AAROM (active assistive range of motion)  -KG     Wrist AROM (Therapeutic Exercise) left;bilateral;flexion;extension;10 repetitions  -KG     Wrist AAROM (Therapeutic Exercise) right;bilateral;flexion;extension;10 repetitions  -KG       Row Name 06/23/25 1252          Hand (Therapeutic Exercise)    Hand (Therapeutic Exercise) AROM (active range of motion)  -KG     Hand AROM/AAROM (Therapeutic Exercise) bilateral;AROM (active range of motion);AAROM (active assistive range of motion);finger flexion;finger extension;10 repetitions  -KG       Row Name 06/23/25 1252          Motor Skills    Therapeutic Exercise shoulder;elbow/forearm;wrist;hand  -KG               User Key  (r) = Recorded By, (t) = Taken By, (c) = Cosigned By      Initials Name Provider Type    KG Marko Young, OT Occupational Therapist                   Goals/Plan    No documentation.                  Clinical Impression       Row Name 06/23/25 1253          Pain Assessment    Pretreatment Pain Rating 6/10  -KG     Posttreatment Pain Rating 6/10  -KG     Pain Side/Orientation generalized  -KG     Pain Management Interventions exercise or physical activity utilized  -KG     Response to Pain Interventions no change per patient report  -KG       Row Name 06/23/25 1253          Plan of Care Review    Plan of Care Reviewed With patient  -KG     Outcome Evaluation Pt seen this morning for OT treatment. Declined OOB/EOB or ADL engagement despite encouragement, education on importance, and offers to address  difficulty w/ fine motor aspects of ADLs. Agreeable to bed level therex w/ encouragement. Pt able to complete AROM of LUE , required AAROM of RUE at shoulder, wrist, and hands. Noted RUE pain and bilateral hand pain/swelling, R>L. OT will continue to follow to address functional deficits and maximize performance prior to d/c.  -KG       Row Name 06/23/25 1253          Therapy Plan Review/Discharge Plan (OT)    Anticipated Discharge Disposition (OT) skilled nursing facility  -KG       Row Name 06/23/25 1253          Vital Signs    Pre Patient Position Supine  -KG     Intra Patient Position Supine  -KG     Post Patient Position Supine  -KG       Row Name 06/23/25 1253          Positioning and Restraints    Pre-Treatment Position in bed  -KG     Post Treatment Position bed  -KG     In Bed supine;notified nsg;call light within reach;encouraged to call for assist;exit alarm on  -KG               User Key  (r) = Recorded By, (t) = Taken By, (c) = Cosigned By      Initials Name Provider Type    Marko Chakraborty OT Occupational Therapist                   Outcome Measures       Row Name 06/23/25 0900          How much help from another person do you currently need...    Turning from your back to your side while in flat bed without using bedrails? 3  -MB     Moving from lying on back to sitting on the side of a flat bed without bedrails? 3  -MB     Moving to and from a bed to a chair (including a wheelchair)? 3  -MB     Standing up from a chair using your arms (e.g., wheelchair, bedside chair)? 3  -MB     Climbing 3-5 steps with a railing? 2  -MB     To walk in hospital room? 2  -MB     AM-PAC 6 Clicks Score (PT) 16  -MB               User Key  (r) = Recorded By, (t) = Taken By, (c) = Cosigned By      Initials Name Provider Type    Nichole Altamirano, RN Registered Nurse                      OT Recommendation and Plan     Plan of Care Review  Plan of Care Reviewed With: patient  Outcome Evaluation: Pt seen this morning for  OT treatment. Declined OOB/EOB or ADL engagement despite encouragement, education on importance, and offers to address difficulty w/ fine motor aspects of ADLs. Agreeable to bed level therex w/ encouragement. Pt able to complete AROM of LUE , required AAROM of RUE at shoulder, wrist, and hands. Noted RUE pain and bilateral hand pain/swelling, R>L. OT will continue to follow to address functional deficits and maximize performance prior to d/c.     Time Calculation:         Time Calculation- OT       Row Name 06/23/25 1257             Time Calculation- OT    OT Start Time 0958  -KG      OT Stop Time 1014  -KG      OT Time Calculation (min) 16 min  -KG      Total Timed Code Minutes- OT 16 minute(s)  -KG      OT Received On 06/23/25  -KG      OT - Next Appointment 06/24/25  -KG         Timed Charges    13291 - OT Therapeutic Exercise Minutes 16  -KG         Total Minutes    Timed Charges Total Minutes 16  -KG       Total Minutes 16  -KG                User Key  (r) = Recorded By, (t) = Taken By, (c) = Cosigned By      Initials Name Provider Type    KG Marko Young OT Occupational Therapist                  Therapy Charges for Today       Code Description Service Date Service Provider Modifiers Qty    [id number] HC OT THER PROC EA 15 MIN 6/23/2025 Marko Young OT GO 1                 Marko Young OT  6/23/2025

## 2025-06-23 NOTE — PLAN OF CARE
Goal Outcome Evaluation:           Progress: no change  Outcome Evaluation: VSS. BM today. MRI of brain and thoracic spine ordered. No sticks on the R. Fall precautions in place. Up with assist X2. Bladder scan once a shift. Case management working on placement to [place] for rehab.

## 2025-06-23 NOTE — PROGRESS NOTES
[facility]ued Stay [facility]     Patient Name: Candice Walker  MRN: [id number]  Today's Date: 6/23/2025    Admit Date: 6/17/2025    [facility]   Discharge Plan       Row Name 06/23/25 1500       Plan    Plan [facility]    Plan Comments Msg sent to [facility] Post Acute Authorizations <[email or web address] to begin Pre-cert for [facility].Updated Gemma with [facility]      Row Name 06/23/25 1324       Plan    Plan [facility]END[facility]    Plan Comments Per Gemma with [facility], patient [facility]. Okay to begin Pre-cert.  Bed available after 3pm today                   Discharge Codes    No documentation.                 Expected Discharge Date and Time       Expected Discharge Date Expected Discharge Time    Jun 23, 2025               Mare Warner RN

## 2025-06-23 NOTE — NURSING NOTE
Offered stool softener this morning for constipation and refused along with the scheduled protonix, refused BP with vitals because she was uncomfortable/hurt to have the cuff inflate, Bladder scanned and resulted with 398.
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
## 2025-06-23 NOTE — PLAN OF CARE
Goal Outcome Evaluation:  Plan of Care Reviewed With: patient           Outcome Evaluation: VSS, norco given for c/o shoulder/ neck and knee pain, up to BSC, no BM voided 100 ml of  dark yrllow urine, refuused meds. becomes irritated with care, and argumentative with staff, refused turns and repositioning through shift, does weight shift in bed, will ontinue o monitor.

## 2025-06-23 NOTE — PROGRESS NOTES
"   LOS: 5 days     Chief Complaint/ Reason for encounter: Hyponatremia, PHUONG    Subjective   06/19/25 : Patient is doing  better today with no new complaints  Good appetite with no nausea or vomiting  No shortness of breath chest pain or edema  Voiding well with no dysuria    6/21 no new events     6/22: She feels well denies complaints    6/23: Feels well, in good spirits  No shortness of breath or edema    Medical history reviewed:  History of Present Illness    Subjective    History taken from: Chart and patient/family as able    Vital Signs  Temp:  [97 °F (36.1 °C)-97.6 °F (36.4 °C)] 97.5 °F (36.4 °C)  Heart Rate:  [52-63] 63  Resp:  [18] 18  BP: (134-158)/(62-90) 153/90       Wt Readings from Last 1 Encounters:   06/18/25 0038 64.2 kg (141 lb 8.6 oz)   06/17/25 2243 63.6 kg (140 lb 3.4 oz)       Objective:  Vital signs: (most recent): Blood pressure 153/90, pulse 63, temperature 97.5 °F (36.4 °C), temperature source Oral, resp. rate 18, height 157.5 cm (62.01\"), weight 64.2 kg (141 lb 8.6 oz), SpO2 90%.              Objective:  General Appearance:  Comfortable, chronically ill-appearing, no acute distress   HEENT: Mucous membranes moist, atraumatic  Lungs:  Normal effort and normal respiratory rate.  Breath sounds clear to auscultation: No rhonchi/Rales.  No  respiratory distress.   Heart:  S1, S2 normal.   Abdomen: Abdomen is soft, nontender/nondistended  Extremities: No significant edema of bilateral lower extremities  Skin:  Warm and dry       Results Review:    Intake/Output:     Intake/Output Summary (Last 24 hours) at 6/23/2025 1142  Last data filed at 6/23/2025 1030  Gross per 24 hour   Intake 120 ml   Output 550 ml   Net -430 ml         DATA:  Radiology and Labs:  The following labs independently reviewed by me. Additional labs ordered for tomorrow a.m.  Interval notes, chart personally reviewed by me.   Old records independently reviewed showing baseline creatinine around 1  Discussed with patient " himself at bedside    Risk/ complexity of medical care/ medical decision making moderate complexity, electrolyte management    Labs:   Recent Results (from the past 24 hours)   Renal Function Panel    Collection Time: 06/23/25  6:53 AM    Specimen: Blood   Result Value Ref Range    Glucose 76 65 - 99 mg/dL    BUN 13.0 8.0 - 23.0 mg/dL    Creatinine 0.87 0.57 - 1.00 mg/dL    Sodium 131 (L) 136 - 145 mmol/L    Potassium 3.8 3.5 - 5.2 mmol/L    Chloride 97 (L) 98 - 107 mmol/L    CO2 22.9 22.0 - 29.0 mmol/L    Calcium 9.3 8.6 - 10.5 mg/dL    Albumin 2.7 (L) 3.5 - 5.2 g/dL    Phosphorus 3.3 2.5 - 4.5 mg/dL    Anion Gap 11.1 5.0 - 15.0 mmol/L    BUN/Creatinine Ratio 14.9 7.0 - 25.0    eGFR 63.8 >60.0 mL/min/1.73       Radiology:  Pertinent radiology studies were reviewed as described above      Medications have been reviewed separately in chart review medication tab      ASSESSMENT:    Assessment & Plan  PHUONG, improved and nearing baseline  Hyponatremia, steady increase, correcting at an acceptable rate  Metabolic acidosis, improved  UTI   HTN, near goal for age  EtOH abdulaziz   Hypomagnesemia, improved  8. COPD.   9. Hypokalemia      Plan:  Cr stable at 0.87 which is near baseline  Na steady increase, 131 today.  Euvolemic  Continue to Monitor off IV fluids   Need for diuretics at this time, Lasix remains on hold since 6/18    Continue on combination of amlodipine/valsartan at home dose   BP acceptable for age  Stay off hctz indefinitely     Replace K prn per protocol, as needed  Mg at goal  Encourage nutrition and oral hydration  Continue abx.  Dosed for GFR 40-60        Leonides Victoria MD  [facility]   Office phone number: [phone]  Answering service phone number: [phone]    06/23/25  11:42 EDT    Dictation performed using Dragon dictation software

## 2025-06-23 NOTE — PLAN OF CARE
Goal Outcome Evaluation:  Plan of Care Reviewed With: patient        Progress: no change  Outcome Evaluation: Pt initially refused PT tx session as she states she has too much going on and is in too much pain, refused to provide numerical value for her pain in her R knee, R shoulder, and B hands. Spent time educated pt on her need for mobility in order to progress but she refuses to participate in any mobility that requires using her LEs. Able to provide pt w/ gentle AROM of her UEs but all exercises provided she yelps in pain and grabs her R shoulder, only able to tolerate digit flexion. Performed APs w/out much pain but after her 8th rep she reports too much pain to continue. Performed PROM of ankles to perform inversion, eversion, DF, and PF but pt continues to state too much pain. Tolerated scap retractions this date fair for 5 reps. Unable to progress due to increased pain and refusal of mobility. Pt reports she does not want to go to rehab and would like to return home, pt appears unsafe to return home at this time.    Anticipated Discharge Disposition (PT): skilled nursing facility

## 2025-06-23 NOTE — PROGRESS NOTES
Continued Stay Note  [facility]     Patient Name: Candice Walker  MRN: [id number]  Today's Date: 6/23/2025    Admit Date: 6/17/2025    [facility]: [facility]   Discharge Plan       Row Name 06/23/25 1631       Plan    Plan [facility]    Plan Comments Spoke to patient at length regarding SNF. Patient is now [facility]abel [facility]. Pre-cet not started. Will get updated PT note in Am and will resubmit. Spoke with Jania with [facility] and Nelda with [facility]. Updated RN. Msg sent to PT to see in AM      Row Name 06/23/25 1500       Plan    Plan [facility] PENDING Pre-cert    Plan Comments Msg sent to [facility] Post Acute Authorizations <[email or web address] to begin Pre-cert for [facility].Updated Jania with [facility]                   Discharge Codes    No documentation.                 Expected Discharge Date and Time       Expected Discharge Date Expected Discharge Time    Jun 24, 2025               Mare Warner, RASTA

## 2025-06-23 NOTE — PLAN OF CARE
Goal Outcome Evaluation:  Plan of Care Reviewed With: patient           Outcome Evaluation: Pt seen this morning for OT treatment. Declined OOB/EOB or ADL engagement despite encouragement, education on importance, and offers to address difficulty w/ fine motor aspects of ADLs. Agreeable to bed level therex w/ encouragement. Pt able to complete AROM of LUE , required AAROM of RUE at shoulder, wrist, and hands. Noted RUE pain and bilateral hand pain/swelling, R>L. OT will continue to follow to address functional deficits and maximize performance prior to d/c.    Anticipated Discharge Disposition (OT): skilled nursing facility

## 2025-06-23 NOTE — PROGRESS NOTES
Continued Stay Note  [facility]     Patient Name: Candice Walker  MRN: [id number]  Today's Date: 6/23/2025    Admit Date: 6/17/2025    Plan: [facility] PENDING [facility]-cert   Discharge Plan       Row Name 06/23/25 1324       Plan    Plan [facility]    Plan Comments Per Jania with [facility], [facility]. Okay to begin Pre-cert.  Bed available after 3pm today                   Discharge Codes    No documentation.                 Expected Discharge Date and Time       Expected Discharge Date Expected Discharge Time    Jun 23, 2025               Mare Warner RN

## 2025-06-23 NOTE — CASE MANAGEMENT/SOCIAL WORK
Post-Acute Authorization Submission      Post Acute Pre-Cert Documentation  Request Submitted by Facility - Type:: Hospital  Post-Acute Authorization Type Submitted:: SNF  Date Post Acute Pre-Cert Inititated per Facility: 06/23/25  Accepting Facility: [facility] Discharge Date Requested: 06/24/25  All Clinicals Submitted?: Yes  Had Accepting Facility at Time of Submission: Yes  Response Communicated to:: , Accepting Facility Liaison  Authorization Number:: PENDING [id number]              JOSE Rojo

## 2025-06-24 LAB
ALBUMIN SERPL-MCNC: 3.4 G/DL (ref 3.5–5.2)
ANION GAP SERPL CALCULATED.3IONS-SCNC: 12.4 MMOL/L (ref 5–15)
BASOPHILS # BLD AUTO: 0.01 10*3/MM3 (ref 0–0.2)
BASOPHILS NFR BLD AUTO: 0.3 % (ref 0–1.5)
BUN SERPL-MCNC: 12 MG/DL (ref 8–23)
BUN/CREAT SERPL: 15 (ref 7–25)
CALCIUM SPEC-SCNC: 9.2 MG/DL (ref 8.6–10.5)
CHLORIDE SERPL-SCNC: 94 MMOL/L (ref 98–107)
CO2 SERPL-SCNC: 21.6 MMOL/L (ref 22–29)
CREAT SERPL-MCNC: 0.8 MG/DL (ref 0.57–1)
DEPRECATED RDW RBC AUTO: 43.9 FL (ref 37–54)
EGFRCR SERPLBLD CKD-EPI 2021: 70.5 ML/MIN/1.73
EOSINOPHIL # BLD AUTO: 0 10*3/MM3 (ref 0–0.4)
EOSINOPHIL NFR BLD AUTO: 0 % (ref 0.3–6.2)
ERYTHROCYTE [DISTWIDTH] IN BLOOD BY AUTOMATED COUNT: 14.1 % (ref 12.3–15.4)
FOLATE SERPL-MCNC: 8.2 NG/ML (ref 4.78–24.2)
GLUCOSE SERPL-MCNC: 118 MG/DL (ref 65–99)
HBA1C MFR BLD: 5.4 % (ref 4.8–5.6)
HCT VFR BLD AUTO: 30.7 % (ref 34–46.6)
HGB BLD-MCNC: 9.5 G/DL (ref 12–15.9)
IMM GRANULOCYTES # BLD AUTO: 0 10*3/MM3 (ref 0–0.05)
IMM GRANULOCYTES NFR BLD AUTO: 0 % (ref 0–0.5)
LYMPHOCYTES # BLD AUTO: 0.55 10*3/MM3 (ref 0.7–3.1)
LYMPHOCYTES NFR BLD AUTO: 17.5 % (ref 19.6–45.3)
MAGNESIUM SERPL-MCNC: 1.3 MG/DL (ref 1.6–2.4)
MCH RBC QN AUTO: 26.4 PG (ref 26.6–33)
MCHC RBC AUTO-ENTMCNC: 30.9 G/DL (ref 31.5–35.7)
MCV RBC AUTO: 85.3 FL (ref 79–97)
MONOCYTES # BLD AUTO: 0.3 10*3/MM3 (ref 0.1–0.9)
MONOCYTES NFR BLD AUTO: 9.6 % (ref 5–12)
NEUTROPHILS NFR BLD AUTO: 2.28 10*3/MM3 (ref 1.7–7)
NEUTROPHILS NFR BLD AUTO: 72.6 % (ref 42.7–76)
NRBC BLD AUTO-RTO: 0 /100 WBC (ref 0–0.2)
PHOSPHATE SERPL-MCNC: 2.8 MG/DL (ref 2.5–4.5)
PLATELET # BLD AUTO: 267 10*3/MM3 (ref 140–450)
PMV BLD AUTO: 9 FL (ref 6–12)
POTASSIUM SERPL-SCNC: 3.8 MMOL/L (ref 3.5–5.2)
RBC # BLD AUTO: 3.6 10*6/MM3 (ref 3.77–5.28)
SODIUM SERPL-SCNC: 128 MMOL/L (ref 136–145)
VIT B12 BLD-MCNC: 316 PG/ML (ref 211–946)
WBC NRBC COR # BLD AUTO: 3.14 10*3/MM3 (ref 3.4–10.8)

## 2025-06-24 PROCEDURE — 25010000002 MAGNESIUM SULFATE 2 GM/50ML SOLUTION: Performed by: INTERNAL MEDICINE

## 2025-06-24 PROCEDURE — 86431 RHEUMATOID FACTOR QUANT: CPT | Performed by: INTERNAL MEDICINE

## 2025-06-24 PROCEDURE — 83036 HEMOGLOBIN GLYCOSYLATED A1C: CPT | Performed by: INTERNAL MEDICINE

## 2025-06-24 PROCEDURE — 94799 UNLISTED PULMONARY SVC/PX: CPT

## 2025-06-24 PROCEDURE — 97530 THERAPEUTIC ACTIVITIES: CPT

## 2025-06-24 PROCEDURE — 82746 ASSAY OF FOLIC ACID SERUM: CPT | Performed by: INTERNAL MEDICINE

## 2025-06-24 PROCEDURE — 63710000001 PREDNISONE PER 1 MG: Performed by: INTERNAL MEDICINE

## 2025-06-24 PROCEDURE — 82607 VITAMIN B-12: CPT | Performed by: INTERNAL MEDICINE

## 2025-06-24 PROCEDURE — 99222 1ST HOSP IP/OBS MODERATE 55: CPT | Performed by: NURSE PRACTITIONER

## 2025-06-24 PROCEDURE — 86200 CCP ANTIBODY: CPT | Performed by: INTERNAL MEDICINE

## 2025-06-24 PROCEDURE — 80069 RENAL FUNCTION PANEL: CPT | Performed by: INTERNAL MEDICINE

## 2025-06-24 PROCEDURE — 85025 COMPLETE CBC W/AUTO DIFF WBC: CPT | Performed by: INTERNAL MEDICINE

## 2025-06-24 PROCEDURE — 83735 ASSAY OF MAGNESIUM: CPT | Performed by: INTERNAL MEDICINE

## 2025-06-24 RX ORDER — MAGNESIUM SULFATE HEPTAHYDRATE 40 MG/ML
2 INJECTION, SOLUTION INTRAVENOUS
Status: COMPLETED | OUTPATIENT
Start: 2025-06-24 | End: 2025-06-24

## 2025-06-24 RX ADMIN — SERTRALINE HYDROCHLORIDE 100 MG: 100 TABLET, FILM COATED ORAL at 08:51

## 2025-06-24 RX ADMIN — PANTOPRAZOLE SODIUM 40 MG: 40 TABLET, DELAYED RELEASE ORAL at 06:10

## 2025-06-24 RX ADMIN — LEVOTHYROXINE SODIUM 125 MCG: 125 TABLET ORAL at 08:51

## 2025-06-24 RX ADMIN — MAGNESIUM SULFATE HEPTAHYDRATE 2 G: 40 INJECTION, SOLUTION INTRAVENOUS at 13:04

## 2025-06-24 RX ADMIN — HYDROCODONE BITARTRATE AND ACETAMINOPHEN 1 TABLET: 5; 325 TABLET ORAL at 23:02

## 2025-06-24 RX ADMIN — MAGNESIUM SULFATE HEPTAHYDRATE 2 G: 40 INJECTION, SOLUTION INTRAVENOUS at 08:52

## 2025-06-24 RX ADMIN — DOCUSATE SODIUM 50 MG AND SENNOSIDES 8.6 MG 2 TABLET: 8.6; 5 TABLET, FILM COATED ORAL at 17:46

## 2025-06-24 RX ADMIN — BISOPROLOL FUMARATE 5 MG: 5 TABLET ORAL at 08:51

## 2025-06-24 RX ADMIN — FERROUS SULFATE TAB 325 MG (65 MG ELEMENTAL FE) 325 MG: 325 (65 FE) TAB at 08:51

## 2025-06-24 RX ADMIN — VALSARTAN: 160 TABLET, FILM COATED ORAL at 21:00

## 2025-06-24 RX ADMIN — PREDNISONE 40 MG: 20 TABLET ORAL at 08:51

## 2025-06-24 RX ADMIN — FOLIC ACID 1 MG: 1 TABLET ORAL at 08:51

## 2025-06-24 RX ADMIN — MAGNESIUM SULFATE HEPTAHYDRATE 2 G: 40 INJECTION, SOLUTION INTRAVENOUS at 16:31

## 2025-06-24 RX ADMIN — ATORVASTATIN CALCIUM 10 MG: 20 TABLET, FILM COATED ORAL at 08:51

## 2025-06-24 RX ADMIN — HYDROCODONE BITARTRATE AND ACETAMINOPHEN 1 TABLET: 5; 325 TABLET ORAL at 04:10

## 2025-06-24 RX ADMIN — Medication 100 MG: at 08:51

## 2025-06-24 NOTE — THERAPY TREATMENT NOTE
Patient Name: Candice Walker  : 1935    MRN: [id number]                              Today's Date: 2025       Admit Date: 2025    Visit Dx:     ICD-10-CM ICD-9-CM   1. Urinary tract infection without hematuria, site unspecified  N39.0 599.0   2. Hyponatremia  E87.1 276.1   3. PHUONG (acute kidney injury)  N17.9 584.9   4. Altered mental status, unspecified altered mental status type  R41.82 780.97   5. Follow-up exam  Z09 V67.9     Patient Active Problem List   Diagnosis    S/p reverse total shoulder arthroplasty    Acute UTI (urinary tract infection)    Hyponatremia    PHUONG (acute kidney injury)    Hypomagnesemia    Hypertension    Anemia    COPD (chronic obstructive pulmonary disease)    Alcohol use     Past Medical History:   Diagnosis Date    Anemia     Anxiety     Arthritis     Cancer 2006    BREAST CANCER, RIGHT MASTECTOMY    Chronic bronchitis     Depression     Disease of thyroid gland     GERD (gastroesophageal reflux disease)     Hard of hearing     Heart murmur     History of kidney stones     History of UTI 2017    HAD ABX    Hyperlipidemia     Hypertension     MRSA (methicillin resistant Staphylococcus aureus) infection s    EARLY , HERE AT [facility], LEFT KNEE  , D/W GISELLE IN INFECTION CONTROL    RBBB     Risk factors for obstructive sleep apnea     Shoulder pain     LEFT     Past Surgical History:   Procedure Laterality Date    APPENDECTOMY      CATARACT EXTRACTION      WITH LENS IMPLANT    CHOLECYSTECTOMY      CYST REMOVAL      ON SPINE    EYE SURGERY      HYSTERECTOMY      JOINT REPLACEMENT      LT KNEE    MASTECTOMY Right 2006    MASTECTOMY RADICAL WITH AXILLARY NODE DISSECTION Right     THYROIDECTOMY      TOTAL SHOULDER ARTHROPLASTY W/ DISTAL CLAVICLE EXCISION Left 2017    Procedure: LT TOTAL SHOULDER REVERSE ARTHROPLASTY;  Surgeon: Ezequiel Elizalde MD;  Location: [facility] OR [facility];  Service:       General Information       Row Name 25 0840          Physical Therapy Time  and Intention    Document Type therapy note (daily note)  -CS     Mode of Treatment individual therapy;physical therapy  -CS       Row Name 06/24/25 0840          General Information    Patient Profile Reviewed yes  -CS     Existing Precautions/Restrictions fall  -CS       Row Name 06/24/25 0840          Cognition    Orientation Status (Cognition) oriented to;person;place;situation  -CS       Row Name 06/24/25 0840          Safety Issues/Impairments Affecting Functional Mobility    Safety Issues Affecting Function (Mobility) ability to follow commands  -CS     Impairments Affecting Function (Mobility) balance;endurance/activity tolerance;pain;grasp;strength;range of motion (ROM)  -CS     Comment, Safety Issues/Impairments (Mobility) Gait belt and non-skid socks donned  -CS               User Key  (r) = Recorded By, (t) = Taken By, (c) = Cosigned By      Initials Name Provider Type    CS Wilder Pineda PT Physical Therapist                   Mobility       Row Name 06/24/25 0843          Bed Mobility    Comment, (Bed Mobility) UIC upon entry  -CS       Row Name 06/24/25 0843          Sit-Stand Transfer    Sit-Stand [place] (Transfers) minimum assist (75% patient effort)  -CS     Assistive Device (Sit-Stand Transfers) walker, front-wheeled  -CS     Comment, (Sit-Stand Transfer) Multiple attempts, perform rocks for added momentum but unsure when pt is actually going to attempt as she does not follow any cues  -CS       Row Name 06/24/25 0843          Gait/Stairs (Locomotion)    [place] Level (Gait) minimum assist (75% patient effort);contact guard  -CS     Assistive Device (Gait) walker, front-wheeled  -CS     Patient was able to Ambulate yes  -CS     Distance in Feet (Gait) 55  -CS     Deviations/Abnormal Patterns (Gait) stride length decreased;lata decreased  -CS     Bilateral Gait Deviations forward flexed posture  -CS     Right Sided Gait Deviations weight shift ability decreased;decreased knee  extension  -CS     [place] Level (Stairs) not tested  -CS     Comment, (Gait/Stairs) Fwd flexed posture, decreased weight shifting ability on RLE due to R knee pain  -CS               User Key  (r) = Recorded By, (t) = Taken By, (c) = Cosigned By      Initials Name Provider Type    CS Wilder Pineda, CLARE Physical Therapist                   Obj/Interventions       Row Name 06/24/25 0844          Range of Motion Comprehensive    Comment, General Range of Motion Conitnues w/ decreased BUE mobility and RLE mobility  -CS       Row Name 06/24/25 0844          Motor Skills    Motor Skills functional endurance  -CS     Functional Endurance Fair, improving  -CS       Row Name 06/24/25 0844          Balance    Balance Assessment sitting static balance;sitting dynamic balance;standing static balance;standing dynamic balance  -CS     Static Sitting Balance supervision  -CS     Dynamic Sitting Balance supervision  -CS     Position, Sitting Balance sitting in chair  -CS     Static Standing Balance contact guard  -CS     Dynamic Standing Balance minimal assist;contact guard  -CS     Position/Device Used, Standing Balance supported;walker, front-wheeled  -CS     Balance Interventions sitting;standing;sit to stand;supported;static;dynamic  -CS     Comment, Balance Chris for mobility due to safety concerns as she demonstrates poor weight shifting ability and limps w/ ambulation  -CS               User Key  (r) = Recorded By, (t) = Taken By, (c) = Cosigned By      Initials Name Provider Type    Wilder Hilton PT Physical Therapist                   Goals/Plan    No documentation.                  Clinical Impression       Row Name 06/24/25 0845          Pain    Pretreatment Pain Rating 6/10  -CS     Posttreatment Pain Rating 7/10  -CS     Pain Location knee;hand  -CS     Pain Side/Orientation bilateral;right  -CS     Pain Management Interventions activity modification encouraged  -CS     Response to Pain Interventions activity  "participation with increased pain  -CS       Row Name 06/24/25 0845          Pain Scale: FACES Pre/Post-Treatment    Pain: FACES Scale, Pretreatment 0-->no hurt  -CS     Posttreatment Pain Rating 4-->hurts little more  -CS       Row Name 06/24/25 0845          Plan of Care Review    Plan of Care Reviewed With patient  -CS     Progress improving  -CS     Outcome Evaluation Pt initially resistant to PT tx session this date for multiple reasons, but agreeable after verbalizing her need to get up. Pt UIC to being, requires Chris for STS t/f but performs several \"rocks\" before actually performing. Due to her hearing deficits pt struggles w/ cueing at times and requires variable methods to improve. Pt improves ambulation distance to 55ft w/ RWx requiring Chris/CGA due to increased RUE pain and R knee pain. She demonstrated forward flexion w/ decreased weight shift similar to a limp all limited due to pain. All needs met at end of session.  -CS       Row Name 06/24/25 0845          Therapy Assessment/Plan (PT)    Rehab Potential (PT) fair  -CS     Criteria for Skilled Interventions Met (PT) yes  -CS     Therapy Frequency (PT) 5 times/wk  -CS       Row Name 06/24/25 0845          Vital Signs    O2 Delivery Pre Treatment room air  -CS     O2 Delivery Intra Treatment room air  -CS     O2 Delivery Post Treatment room air  -CS     Pre Patient Position Sitting  -CS     Intra Patient Position Standing  -CS     Post Patient Position Sitting  -CS       Row Name 06/24/25 0845          Positioning and Restraints    Pre-Treatment Position sitting in chair/recliner  -CS     Post Treatment Position chair  -CS     In Chair notified nsg;sitting;call light within reach;exit alarm on;encouraged to call for assist;with nsg  -CS               User Key  (r) = Recorded By, (t) = Taken By, (c) = Cosigned By      Initials Name Provider Type    CS Wilder Pineda, PT Physical Therapist                   Outcome Measures       Row Name 06/24/25 0846    "       How much help from another person do you currently need...    Turning from your back to your side while in flat bed without using bedrails? 3  -CS     Moving from lying on back to sitting on the side of a flat bed without bedrails? 3  -CS     Moving to and from a bed to a chair (including a wheelchair)? 3  -CS     Standing up from a chair using your arms (e.g., wheelchair, bedside chair)? 3  -CS     Climbing 3-5 steps with a railing? 2  -CS     To walk in hospital room? 3  -CS     AM-PAC 6 Clicks Score (PT) 17  -CS               User Key  (r) = Recorded By, (t) = Taken By, (c) = Cosigned By      Initials Name Provider Type    CS Wilder Pineda PT Physical Therapist                                 Physical Therapy Education       Title: PT OT SLP Therapies (Done)       Topic: Physical Therapy (Done)       Point: Mobility training (Done)       Learning Progress Summary            Patient Acceptance, E, VU by  at 6/24/2025 0846    Nonacceptance, E, NR by  at 6/23/2025 1430    Acceptance, E, VU,DU by  at 6/20/2025 1422                      Point: Home exercise program (Done)       Learning Progress Summary            Patient Acceptance, E, VU by  at 6/24/2025 0846    Nonacceptance, E, NR by  at 6/23/2025 1430    Acceptance, E, VU,DU by  at 6/20/2025 1422                      Point: Body mechanics (Done)       Learning Progress Summary            Patient Acceptance, E, VU by  at 6/24/2025 0846    Acceptance, E, VU,DU by  at 6/20/2025 1422                      Point: Precautions (Done)       Learning Progress Summary            Patient Acceptance, E, VU by  at 6/24/2025 0846    Acceptance, E, VU,DU by  at 6/20/2025 1422                                      User Key       Initials Effective Dates Name Provider Type Discipline     09/06/24 -  Wilder Pineda PT Physical Therapist PT                  PT Recommendation and Plan     Progress: improving  Outcome Evaluation: Pt initially resistant to PT  "tx session this date for multiple reasons, but agreeable after verbalizing her need to get up. Pt UIC to being, requires Chris for STS t/f but performs several \"rocks\" before actually performing. Due to her hearing deficits pt struggles w/ cueing at times and requires variable methods to improve. Pt improves ambulation distance to 55ft w/ RWx requiring Chris/CGA due to increased RUE pain and R knee pain. She demonstrated forward flexion w/ decreased weight shift similar to a limp all limited due to pain. All needs met at end of session.     Time Calculation:         PT Charges       Row Name 06/24/25 0846             Time Calculation    Start Time 0822  -CS      Stop Time 0838  -CS      Time Calculation (min) 16 min  -CS      PT Received On 06/24/25  -CS      PT - Next Appointment 06/25/25  -CS      PT Goal Re-Cert Due Date 07/04/25  -CS         Time Calculation- PT    Total Timed Code Minutes- PT 16 minute(s)  -CS         Timed Charges    48798 - PT Therapeutic Activity Minutes 16  -CS         Total Minutes    Timed Charges Total Minutes 16  -CS       Total Minutes 16  -CS                User Key  (r) = Recorded By, (t) = Taken By, (c) = Cosigned By      Initials Name Provider Type    CS Wilder Pineda, PT Physical Therapist                  Therapy Charges for Today       Code Description Service Date Service Provider Modifiers Qty    80852850720  PT THER PROC EA 15 MIN 6/23/2025 Wilder Pineda, PT GP 1    [id number] HC PT THERAPEUTIC ACT EA 15 MIN 6/24/2025 Wilder Pineda, PT GP 1            PT G-Codes  Outcome Measure Options: AM-PAC 6 Clicks Daily Activity (OT), Modified [place]  AM-PAC 6 Clicks Score (PT): 17  AM-PAC 6 Clicks Score (OT): 16  Modified [place] Scale: 4 - Moderately severe disability.  Unable to walk without assistance, and unable to attend to own bodily needs without assistance.  PT Discharge Summary  Anticipated Discharge Disposition (PT): skilled nursing facility    Wilder Pineda PT  6/24/2025    "

## 2025-06-24 NOTE — CASE MANAGEMENT/SOCIAL WORK
Post-Acute Authorization Submission      Post Acute Pre-Cert Documentation  Request Submitted by Facility - Type:: Hospital  Post-Acute Authorization Type Submitted:: SNF  Date Post Acute Pre-Cert Inititated per Facility: 06/23/25  Date Post Acute Pre-Cert Completed: 06/24/25  Accepting Facility: [facility] Discharge Date Requested: 06/24/25  All Clinicals Submitted?: Yes  Had Accepting Facility at Time of Submission: Yes  Response Received from Insurance?: Approval  Action Taken by Requesting Facility:: Additional Clinicals Submitted  Response Communicated to:: , Accepting Facility Liaison, Accepting Facility Auth Department  Authorization Number:: APPROVED [id number]  Post Acute Pre-Cert Initiated Comment: VALID TO ADMIT [facility] 6/29/25              JOSE Rojo

## 2025-06-24 NOTE — PLAN OF CARE
Goal Outcome Evaluation:  Plan of Care Reviewed With: patient        Progress: no change  Outcome Evaluation: c/o pain in neck and both hands and right knee. MRI done last evening. Norco for pain. Up with assist using walker to c.  chair alarm  on. Voiding without difficulty but refuses scan of bladder tonight because she doesn't want to get in bed. Awake all night. Appears anxious.

## 2025-06-24 NOTE — PLAN OF CARE
Goal Outcome Evaluation:  Plan of Care Reviewed With: patient, child        Progress: improving  Outcome Evaluation: VSS, on room air, voiding small amounts, no BM today, up in chair, irritable at times but agreeable to care today. Mag replaced per order. Refuses O2 but oxygen maintaining above 90%. Falls precautions in place. A&O x4, up to BSC w/ assist x1 walker and gait belt. Possible d/c tomorrow. Fluid restriction initiated. Pt and daughter updated on plan of care.

## 2025-06-24 NOTE — CASE MANAGEMENT/SOCIAL WORK
Case Management/Social Work    Patient Name:  Candice Walker  YOB: 1935  MRN: [id number]  Admit Date:  6/17/2025        Post Acute Pre-Cert Documentation  SNF  Date Post Acute Pre-Cert Inititated per Facility: 06/23/25  Accepting Facility: [facility] Discharge Date Requested: 06/24/25  PT NOTES FROM TODAY SENT TO INSURANCE  Authorization Number:: PENDING [id number]      Electronically signed by:  JOSE Rojo  06/24/25 10:10 EDT
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
## 2025-06-24 NOTE — PROGRESS NOTES
Continued Stay Note  [facility]     Patient Name: Candice Walker  MRN: [id number]  Today's Date: 6/24/2025    Admit Date: 6/17/2025    Plan: [facility] Pre-cert obtained.   Discharge Plan       Row Name 06/24/25 1453       Plan    Plan [facility] Son[facility] Pre-cert obtained.    Plan Comments Per Braulio pre-cert obtained for [facility]. Informed MD who stated he wanted to wait until tomorrow to DC as her NA is low today. Updated Gemma with [facility]. Spoke to cezar Newman at [phone] and discussed DC tomorrow. Paty stated she will provide transportation to DC. RN updated.                   Discharge Codes    No documentation.                 Expected Discharge Date and Time       Expected Discharge Date Expected Discharge Time    Jun 25, 2025               Mare Warner, RN

## 2025-06-24 NOTE — PLAN OF CARE
"Goal Outcome Evaluation:  Plan of Care Reviewed With: patient        Progress: improving  Outcome Evaluation: Pt initially resistant to PT tx session this date for multiple reasons, but agreeable after verbalizing her need to get up. Pt UIC to being, requires Chris for STS t/f but performs several \"rocks\" before actually performing. Due to her hearing deficits pt struggles w/ cueing at times and requires variable methods to improve. Pt improves ambulation distance to 55ft w/ RWx requiring Chris/CGA due to increased RUE pain and R knee pain. She demonstrated forward flexion w/ decreased weight shift similar to a limp all limited due to pain. All needs met at end of session.    Anticipated Discharge Disposition (PT): skilled nursing facility                        "

## 2025-06-24 NOTE — PROGRESS NOTES
"   LOS: 6 days     Chief Complaint/ Reason for encounter: Hyponatremia, PHUONG    Subjective   06/19/25 : Patient is doing  better today with no new complaints  Good appetite with no nausea or vomiting  No shortness of breath chest pain or edema  Voiding well with no dysuria    6/21 no new events     6/22: She feels well denies complaints    6/23: Feels well, in good spirits  No shortness of breath or edema    6/24 states she feels bad sitting in chair     Medical history reviewed:  History of Present Illness    Subjective    History taken from: Chart and patient/family as able    Vital Signs  Temp:  [96.7 °F (35.9 °C)-97.4 °F (36.3 °C)] 97 °F (36.1 °C)  Heart Rate:  [60-83] 65  Resp:  [16-18] 16  BP: (134-173)/(69-78) 158/71       Wt Readings from Last 1 Encounters:   06/18/25 0038 64.2 kg (141 lb 8.6 oz)   06/17/25 2243 63.6 kg (140 lb 3.4 oz)       Objective:  Vital signs: (most recent): Blood pressure 158/71, pulse 65, temperature 97 °F (36.1 °C), temperature source Oral, resp. rate 16, height 157.5 cm (62.01\"), weight 64.2 kg (141 lb 8.6 oz), SpO2 92%.              Objective:  General Appearance:  Comfortable, chronically ill-appearing, no acute distress   HEENT: Mucous membranes moist, atraumatic  Lungs:  Normal effort and normal respiratory rate.  Breath sounds clear to auscultation: No rhonchi/Rales.  No  respiratory distress.   Heart:  S1, S2 normal.   Abdomen: Abdomen is soft, nontender/nondistended  Extremities: No significant edema of bilateral lower extremities  Skin:  Warm and dry       Results Review:    Intake/Output:     Intake/Output Summary (Last 24 hours) at 6/24/2025 1208  Last data filed at 6/24/2025 0517  Gross per 24 hour   Intake 800 ml   Output 1600 ml   Net -800 ml         DATA:  Radiology and Labs:  The following labs independently reviewed by me. Additional labs ordered for tomorrow a.m.  Interval notes, chart personally reviewed by me.   Old records independently reviewed showing baseline " creatinine around 1  Discussed with patient himself at bedside    Risk/ complexity of medical care/ medical decision making moderate complexity, electrolyte management    Labs:   Recent Results (from the past 24 hours)   Renal Function Panel    Collection Time: 06/24/25  5:28 AM    Specimen: Blood   Result Value Ref Range    Glucose 118 (H) 65 - 99 mg/dL    BUN 12.0 8.0 - 23.0 mg/dL    Creatinine 0.80 0.57 - 1.00 mg/dL    Sodium 128 (L) 136 - 145 mmol/L    Potassium 3.8 3.5 - 5.2 mmol/L    Chloride 94 (L) 98 - 107 mmol/L    CO2 21.6 (L) 22.0 - 29.0 mmol/L    Calcium 9.2 8.6 - 10.5 mg/dL    Albumin 3.4 (L) 3.5 - 5.2 g/dL    Phosphorus 2.8 2.5 - 4.5 mg/dL    Anion Gap 12.4 5.0 - 15.0 mmol/L    BUN/Creatinine Ratio 15.0 7.0 - 25.0    eGFR 70.5 >60.0 mL/min/1.73   Vitamin B12    Collection Time: 06/24/25  5:28 AM    Specimen: Blood   Result Value Ref Range    Vitamin B-12 316 211 - 946 pg/mL   Magnesium    Collection Time: 06/24/25  5:28 AM    Specimen: Blood   Result Value Ref Range    Magnesium 1.3 (L) 1.6 - 2.4 mg/dL   Folate    Collection Time: 06/24/25  5:28 AM    Specimen: Blood   Result Value Ref Range    Folate 8.20 4.78 - 24.20 ng/mL   CBC Auto Differential    Collection Time: 06/24/25  5:28 AM    Specimen: Blood   Result Value Ref Range    WBC 3.14 (L) 3.40 - 10.80 10*3/mm3    RBC 3.60 (L) 3.77 - 5.28 10*6/mm3    Hemoglobin 9.5 (L) 12.0 - 15.9 g/dL    Hematocrit 30.7 (L) 34.0 - 46.6 %    MCV 85.3 79.0 - 97.0 fL    MCH 26.4 (L) 26.6 - 33.0 pg    MCHC 30.9 (L) 31.5 - 35.7 g/dL    RDW 14.1 12.3 - 15.4 %    RDW-SD 43.9 37.0 - 54.0 fl    MPV 9.0 6.0 - 12.0 fL    Platelets 267 140 - 450 10*3/mm3    Neutrophil % 72.6 42.7 - 76.0 %    Lymphocyte % 17.5 (L) 19.6 - 45.3 %    Monocyte % 9.6 5.0 - 12.0 %    Eosinophil % 0.0 (L) 0.3 - 6.2 %    Basophil % 0.3 0.0 - 1.5 %    Immature Grans % 0.0 0.0 - 0.5 %    Neutrophils, Absolute 2.28 1.70 - 7.00 10*3/mm3    Lymphocytes, Absolute 0.55 (L) 0.70 - 3.10 10*3/mm3    Monocytes,  Absolute 0.30 0.10 - 0.90 10*3/mm3    Eosinophils, Absolute 0.00 0.00 - 0.40 10*3/mm3    Basophils, Absolute 0.01 0.00 - 0.20 10*3/mm3    Immature Grans, Absolute 0.00 0.00 - 0.05 10*3/mm3    nRBC 0.0 0.0 - 0.2 /100 WBC   Hemoglobin A1c    Collection Time: 06/24/25  5:29 AM    Specimen: Blood   Result Value Ref Range    Hemoglobin A1C 5.40 4.80 - 5.60 %       Radiology:  Pertinent radiology studies were reviewed as described above      Medications have been reviewed separately in chart review medication tab      ASSESSMENT:    Assessment & Plan  PHUONG, improved and nearing baseline  Hyponatremia, steady increase, correcting at an acceptable rate  Metabolic acidosis, improved  UTI   HTN, near goal for age  EtOH abdulaziz   Hypomagnesemia  8. COPD.   9. Hypokalemia      Plan:  Cr stable at 0.87 which is near baseline  Na at 128 and Euvolemic per exam   Continue to Monitor off IV fluids   No Need for diuretics at this time, Lasix remains on hold since 6/18    Continue on combination of amlodipine/valsartan at home dose   BP acceptable for age  Stay off hctz indefinitely     Replace K & Mg per protocol   Had Mg this am   Encourage nutrition and oral hydration  Continue abx.  Dosed for GFR 40-60        Jeny Victoria MD  [facility]   Office phone number: [phone]  Answering service phone number: [phone]    06/24/25  12:08 EDT    Dictation performed using Dragon dictation software

## 2025-06-24 NOTE — PROGRESS NOTES
Name: Candice Walker ADMIT: 2025   : 1935  PCP: Provider, No Known    MRN: [id number] LOS: 6 days   AGE/SEX: 89 y.o. female  ROOM: [facility]     Subjective   Subjective   Chief Complaint   Patient presents with    Urinary Frequency    Urinary Retention    Abnormal Lab     Swelling in her hands and hand pain has improved some on the steroid.  She also reports she does have some neck tenderness still.  It is more on the left side of her neck with movement and did not radiate down past shoulder.  She is also reporting right upper arm pain.  No chest pain palpitations or shortness of breath.  Not reporting any nausea or vomiting or abdominal pain.     Objective   Objective   Vital Signs  Temp:  [96.7 °F (35.9 °C)-97.4 °F (36.3 °C)] 97 °F (36.1 °C)  Heart Rate:  [60-83] 65  Resp:  [16-18] 16  BP: (134-173)/(69-78) 158/71  SpO2:  [91 %-95 %] 92 %  on   ;   Device (Oxygen Therapy): room air  Body mass index is 25.88 kg/m².    Physical Exam  Vitals and nursing note reviewed.   Constitutional:       General: She is not in acute distress.     Appearance: She is not diaphoretic.   Cardiovascular:      Rate and Rhythm: Normal rate and regular rhythm.      Pulses: Normal pulses.   Pulmonary:      Effort: Pulmonary effort is normal.      Breath sounds: No wheezing.   Abdominal:      General: There is no distension.      Palpations: Abdomen is soft.      Tenderness: There is no abdominal tenderness. There is no guarding or rebound.   Musculoskeletal:         General: Swelling (bilateral hands, improving) and tenderness (hands and neck) present.      Right lower leg: No edema.      Left lower leg: No edema.   Skin:     General: Skin is warm and dry.   Neurological:      Mental Status: She is alert.      Motor: Weakness present.      Comments: PERRL, no apparent facial droop, tongue midline, slight slurring of speech but patient reports no acute change, [place]   Psychiatric:         Mood and Affect: Mood normal.          Behavior: Behavior normal.       Results Review  I reviewed the patient's new clinical results.    Results from last 7 days   Lab Units 06/24/25 0528 06/19/25 1210 06/18/25 0356 06/17/25 2125   WBC 10*3/mm3 3.14* 5.46 7.59 6.91   HEMOGLOBIN g/dL 9.5* 8.4* 8.9* 9.6*   PLATELETS 10*3/mm3 267 221 250 270     Results from last 7 days   Lab Units 06/24/25 0528 06/23/25 0653 06/22/25 0526 06/21/25 0339   SODIUM mmol/L 128* 131* 130* 129*   POTASSIUM mmol/L 3.8 3.8 3.7 3.5   CHLORIDE mmol/L 94* 97* 97* 94*   CO2 mmol/L 21.6* 22.9 24.0 22.0   BUN mg/dL 12.0 13.0 14.0 16.0   CREATININE mg/dL 0.80 0.87 0.96 0.86   GLUCOSE mg/dL 118* 76 84 96   EGFR mL/min/1.73 70.5 63.8 56.7* 64.7     Results from last 7 days   Lab Units 06/24/25 0528 06/23/25 0653 06/22/25 0526 06/21/25 0339 06/20/25 0351 06/17/25 2125   ALBUMIN g/dL 3.4* 2.7* 2.8* 3.1*   < > 3.5   BILIRUBIN mg/dL  --   --   --   --   --  0.4   ALK PHOS U/L  --   --   --   --   --  145*   AST (SGOT) U/L  --   --   --   --   --  14   ALT (SGPT) U/L  --   --   --   --   --  10    < > = values in this interval not displayed.     Results from last 7 days   Lab Units 06/24/25 0528 06/23/25 0653 06/22/25 0526 06/21/25 0339 06/20/25 0351 06/19/25 1210 06/18/25 0356 06/17/25 2125   CALCIUM mg/dL 9.2 9.3 9.1 9.3 9.1   < > 8.3* 8.4*   ALBUMIN g/dL 3.4* 2.7* 2.8* 3.1* 2.9*  --   --  3.5   MAGNESIUM mg/dL 1.3*  --   --   --  2.3  --  2.3 1.0*   PHOSPHORUS mg/dL 2.8 3.3 3.4 2.7 2.6   < >  --   --     < > = values in this interval not displayed.         Hemoglobin A1C   Date/Time Value Ref Range Status   06/24/2025 0529 5.40 4.80 - 5.60 % Final       MRI Brain Without Contrast  Result Date: 6/24/2025  1. No acute intracranial abnormality.   This report was finalized on 6/24/2025 4:24 AM by Dr. Barbara Allan M.D on Workstation: [id number]      MRI Cervical Spine Without Contrast  Result Date: 6/24/2025  1. Degenerative changes, as noted above. Appearance is  similar to prior CT from April 11, 2025.    This report was finalized on 6/24/2025 4:20 AM by Dr. Barbara Allan M.D on Workstation: [id number]      XR Hand 2 View Bilateral  Result Date: 6/23/2025  Advanced arthritis throughout the bilateral hands and wrists. Erosions are present at the bilateral wrists including the left ulnar styloid process and and a few metacarpophalangeal joints, most pronounced at the fourth left metacarpal head, suspicious for rheumatoid arthritis. Advanced bilateral IP joint osteoarthritis is present with few gullwing deformities most prominent at the third left proximal interphalangeal joint, suspicious for erosive osteoarthritis. Flexion deformities of the bilateral phalanges. No appreciable fracture. Alignment limits evaluation for fracture.    This report was finalized on 6/23/2025 3:30 PM by Dr. Manjinder Wells M.D on Workstation: [id number]        I have personally reviewed all medications:  Scheduled Medications  amLODIPine (NORVASC) 5 mg, valsartan (DIOVAN) 160 mg for EXFORGE 5-160, , Oral, Daily  atorvastatin, 10 mg, Oral, Daily  bisoprolol, 5 mg, Oral, Daily  ferrous sulfate, 325 mg, Oral, Daily  Fluticasone Furoate-Vilanterol, 1 puff, Inhalation, Daily - RT  folic acid, 1 mg, Oral, Daily  [Held by provider] furosemide, 40 mg, Oral, Daily  levothyroxine, 125 mcg, Oral, Daily  magnesium sulfate, 2 g, Intravenous, Q2H  pantoprazole, 40 mg, Oral, Q AM  predniSONE, 40 mg, Oral, Daily With Breakfast  sertraline, 100 mg, Oral, Daily  thiamine, 100 mg, Oral, Daily      Infusions     Diet  Diet: Cardiac; Healthy Heart (2-3 Na+); Fluid Consistency: Thin (IDDSI 0)       Assessment/Plan     Active Hospital Problems    Diagnosis  POA    **PHUONG (acute kidney injury) [N17.9]  Yes    COPD (chronic obstructive pulmonary disease) [J44.9]  Yes    Alcohol use [F10.90]  Yes    Acute UTI (urinary tract infection) [N39.0]  Yes    Hyponatremia [E87.1]  Yes    Hypomagnesemia [E83.42]  Yes     Hypertension [I10]  Yes    Anemia [D64.9]  Yes      Resolved Hospital Problems   No resolved problems to display.       89 y.o. female admitted with PHUONG (acute kidney injury).    PHUONG/hyponatremia: HCTZ stopped and listed allergy.  Celebrex held.  Lasix held. Creatinine has improved. Nephrology following.  Acute cystitis: Completing Macrobid course  Degenerative cervical disc disease/Erosive arthritis: She has bilateral hand weakness and does report some neck pain although the neck symptoms are not acute.  Celebrex was held as above.  Has degenerative disease of cervical spine without severe stenosis.  Will ask neurosurgery to review.  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  Rheumatoid panel pending.  The swelling is improving with steroids.  Potential resumption of Celebrex if able from a renal and electrolyte standpoint.  Monitor progression.  Hypomagnesemia  Chronic alcohol use: Complicates the hyponatremia as above.  No acute withdrawal.  Hypertension: Monitoring.  Adjust per renal needs.  PPX: SCD  Disposition: SNF/few days    Expected Discharge Date: 6/24/2025; Expected Discharge Time:      Johnie Moreno MD  [facility]  06/24/25  10:41 EDT    Dictated portions of note using Dragon dictation software.  Copied text in this note has been reviewed by me and remains accurate as of 06/24/25

## 2025-06-25 VITALS
OXYGEN SATURATION: 95 % | SYSTOLIC BLOOD PRESSURE: 151 MMHG | HEART RATE: 52 BPM | RESPIRATION RATE: 16 BRPM | TEMPERATURE: 96.8 F | WEIGHT: 141.54 LBS | HEIGHT: 62 IN | BODY MASS INDEX: 26.05 KG/M2 | DIASTOLIC BLOOD PRESSURE: 68 MMHG

## 2025-06-25 PROBLEM — M50.30 DDD (DEGENERATIVE DISC DISEASE), CERVICAL: Status: ACTIVE | Noted: 2025-06-25

## 2025-06-25 LAB
ALBUMIN SERPL-MCNC: 2.8 G/DL (ref 3.5–5.2)
ANION GAP SERPL CALCULATED.3IONS-SCNC: 10.1 MMOL/L (ref 5–15)
BUN SERPL-MCNC: 12 MG/DL (ref 8–23)
BUN/CREAT SERPL: 16 (ref 7–25)
CALCIUM SPEC-SCNC: 8.6 MG/DL (ref 8.6–10.5)
CCP IGA+IGG SERPL IA-ACNC: 6 UNITS (ref 0–19)
CHLORIDE SERPL-SCNC: 97 MMOL/L (ref 98–107)
CO2 SERPL-SCNC: 22.9 MMOL/L (ref 22–29)
CREAT SERPL-MCNC: 0.75 MG/DL (ref 0.57–1)
DEPRECATED RDW RBC AUTO: 44.6 FL (ref 37–54)
EGFRCR SERPLBLD CKD-EPI 2021: 76.2 ML/MIN/1.73
ERYTHROCYTE [DISTWIDTH] IN BLOOD BY AUTOMATED COUNT: 14.5 % (ref 12.3–15.4)
GLUCOSE SERPL-MCNC: 112 MG/DL (ref 65–99)
HCT VFR BLD AUTO: 27.9 % (ref 34–46.6)
HGB BLD-MCNC: 8.8 G/DL (ref 12–15.9)
MAGNESIUM SERPL-MCNC: 2.5 MG/DL (ref 1.6–2.4)
MCH RBC QN AUTO: 26.7 PG (ref 26.6–33)
MCHC RBC AUTO-ENTMCNC: 31.5 G/DL (ref 31.5–35.7)
MCV RBC AUTO: 84.8 FL (ref 79–97)
PHOSPHATE SERPL-MCNC: 2.4 MG/DL (ref 2.5–4.5)
PLATELET # BLD AUTO: 248 10*3/MM3 (ref 140–450)
PMV BLD AUTO: 9 FL (ref 6–12)
POTASSIUM SERPL-SCNC: 3.3 MMOL/L (ref 3.5–5.2)
RBC # BLD AUTO: 3.29 10*6/MM3 (ref 3.77–5.28)
RHEUMATOID FACT SERPL-ACNC: 10.5 IU/ML
SODIUM SERPL-SCNC: 130 MMOL/L (ref 136–145)
URATE SERPL-MCNC: 4.3 MG/DL (ref 2.4–5.7)
WBC NRBC COR # BLD AUTO: 5.08 10*3/MM3 (ref 3.4–10.8)

## 2025-06-25 PROCEDURE — 85027 COMPLETE CBC AUTOMATED: CPT | Performed by: INTERNAL MEDICINE

## 2025-06-25 PROCEDURE — 63710000001 PREDNISONE PER 1 MG: Performed by: INTERNAL MEDICINE

## 2025-06-25 PROCEDURE — 83735 ASSAY OF MAGNESIUM: CPT | Performed by: INTERNAL MEDICINE

## 2025-06-25 PROCEDURE — 80069 RENAL FUNCTION PANEL: CPT | Performed by: INTERNAL MEDICINE

## 2025-06-25 PROCEDURE — 84550 ASSAY OF BLOOD/URIC ACID: CPT | Performed by: INTERNAL MEDICINE

## 2025-06-25 RX ORDER — FOLIC ACID 1 MG/1
1 TABLET ORAL DAILY
Start: 2025-06-26

## 2025-06-25 RX ORDER — HYDROCODONE BITARTRATE AND ACETAMINOPHEN 5; 325 MG/1; MG/1
1 TABLET ORAL EVERY 4 HOURS PRN
Qty: 12 TABLET | Refills: 0 | Status: SHIPPED | OUTPATIENT
Start: 2025-06-25 | End: 2025-06-29

## 2025-06-25 RX ORDER — PREDNISONE 10 MG/1
10 TABLET ORAL DAILY
Qty: 5 TABLET | Refills: 0 | Status: SHIPPED | OUTPATIENT
Start: 2025-06-25 | End: 2025-06-30

## 2025-06-25 RX ADMIN — FERROUS SULFATE TAB 325 MG (65 MG ELEMENTAL FE) 325 MG: 325 (65 FE) TAB at 10:59

## 2025-06-25 RX ADMIN — PANTOPRAZOLE SODIUM 40 MG: 40 TABLET, DELAYED RELEASE ORAL at 05:09

## 2025-06-25 RX ADMIN — Medication 100 MG: at 10:59

## 2025-06-25 RX ADMIN — ATORVASTATIN CALCIUM 10 MG: 20 TABLET, FILM COATED ORAL at 10:58

## 2025-06-25 RX ADMIN — LEVOTHYROXINE SODIUM 125 MCG: 125 TABLET ORAL at 10:59

## 2025-06-25 RX ADMIN — SERTRALINE HYDROCHLORIDE 100 MG: 100 TABLET, FILM COATED ORAL at 10:59

## 2025-06-25 RX ADMIN — FOLIC ACID 1 MG: 1 TABLET ORAL at 10:59

## 2025-06-25 RX ADMIN — PREDNISONE 40 MG: 20 TABLET ORAL at 10:58

## 2025-06-25 RX ADMIN — BISOPROLOL FUMARATE 5 MG: 5 TABLET ORAL at 10:58

## 2025-06-25 NOTE — PROGRESS NOTES
"   LOS: 7 days     Chief Complaint/ Reason for encounter: Hyponatremia, PHUONG    Subjective   06/19/25 : Patient is doing  better today with no new complaints  Good appetite with no nausea or vomiting  No shortness of breath chest pain or edema  Voiding well with no dysuria    6/21 no new events     6/22: She feels well denies complaints    6/23: Feels well, in good spirits  No shortness of breath or edema    6/24 states she feels bad sitting in chair     6/25: Doing well denies complaints, resting in bed.  States good appetite    Medical history reviewed:  History of Present Illness    Subjective    History taken from: Chart and patient/family as able    Vital Signs  Temp:  [96.7 °F (35.9 °C)-97.4 °F (36.3 °C)] 96.8 °F (36 °C)  Heart Rate:  [52-65] 52  Resp:  [16] 16  BP: (151-164)/(68-76) 151/68       Wt Readings from Last 1 Encounters:   06/18/25 0038 64.2 kg (141 lb 8.6 oz)   06/17/25 2243 63.6 kg (140 lb 3.4 oz)       Objective:  Vital signs: (most recent): Blood pressure 151/68, pulse 52, temperature 96.8 °F (36 °C), temperature source Oral, resp. rate 16, height 157.5 cm (62.01\"), weight 64.2 kg (141 lb 8.6 oz), SpO2 95%.              Objective:  General Appearance:  Comfortable, chronically ill-appearing, no acute distress   HEENT: Mucous membranes moist, atraumatic  Lungs:  Normal effort and normal respiratory rate.  Breath sounds clear to auscultation: No rhonchi/Rales.  No  respiratory distress.   Heart:  S1, S2 normal.   Abdomen: Abdomen is soft, nontender/nondistended  Extremities: No significant edema of bilateral lower extremities  Skin:  Warm and dry       Results Review:    Intake/Output:     Intake/Output Summary (Last 24 hours) at 6/25/2025 1541  Last data filed at 6/25/2025 1029  Gross per 24 hour   Intake 240 ml   Output 1950 ml   Net -1710 ml         DATA:  Radiology and Labs:  The following labs independently reviewed by me. Additional labs ordered for tomorrow a.m.  Interval notes, chart " personally reviewed by me.   Old records independently reviewed showing baseline creatinine around 1  Discussed with patient himself at bedside    Risk/ complexity of medical care/ medical decision making moderate complexity, electrolyte management    Labs:   Recent Results (from the past 24 hours)   Renal Function Panel    Collection Time: 06/25/25  4:44 AM    Specimen: Blood   Result Value Ref Range    Glucose 112 (H) 65 - 99 mg/dL    BUN 12.0 8.0 - 23.0 mg/dL    Creatinine 0.75 0.57 - 1.00 mg/dL    Sodium 130 (L) 136 - 145 mmol/L    Potassium 3.3 (L) 3.5 - 5.2 mmol/L    Chloride 97 (L) 98 - 107 mmol/L    CO2 22.9 22.0 - 29.0 mmol/L    Calcium 8.6 8.6 - 10.5 mg/dL    Albumin 2.8 (L) 3.5 - 5.2 g/dL    Phosphorus 2.4 (L) 2.5 - 4.5 mg/dL    Anion Gap 10.1 5.0 - 15.0 mmol/L    BUN/Creatinine Ratio 16.0 7.0 - 25.0    eGFR 76.2 >60.0 mL/min/1.73   Magnesium    Collection Time: 06/25/25  4:44 AM    Specimen: Blood   Result Value Ref Range    Magnesium 2.5 (H) 1.6 - 2.4 mg/dL   Uric Acid    Collection Time: 06/25/25  4:44 AM    Specimen: Blood   Result Value Ref Range    Uric Acid 4.3 2.4 - 5.7 mg/dL   CBC (No Diff)    Collection Time: 06/25/25  4:45 AM    Specimen: Blood   Result Value Ref Range    WBC 5.08 3.40 - 10.80 10*3/mm3    RBC 3.29 (L) 3.77 - 5.28 10*6/mm3    Hemoglobin 8.8 (L) 12.0 - 15.9 g/dL    Hematocrit 27.9 (L) 34.0 - 46.6 %    MCV 84.8 79.0 - 97.0 fL    MCH 26.7 26.6 - 33.0 pg    MCHC 31.5 31.5 - 35.7 g/dL    RDW 14.5 12.3 - 15.4 %    RDW-SD 44.6 37.0 - 54.0 fl    MPV 9.0 6.0 - 12.0 fL    Platelets 248 140 - 450 10*3/mm3       Radiology:  Pertinent radiology studies were reviewed as described above      Medications have been reviewed separately in chart review medication tab      ASSESSMENT:    Assessment & Plan  PHUONG, improved and nearing baseline  Hyponatremia, steady increase, correcting at an acceptable rate  Metabolic acidosis, improved  UTI   HTN, near goal for age  EtOH abdulaziz   Hypomagnesemia  8.  COPD.   9. Hypokalemia      Plan:  Cr stable at 0.7 which is near baseline  Na up a bit, 130 with overall stable and Euvolemic per exam   Replace potassium per protocol  Continue current BP regimen  Continue to Monitor off IV fluids   No Need for diuretics at this time, Lasix remains on hold since 6/18    Continue on combination of amlodipine/valsartan at home dose   BP acceptable for age  Stay off hctz indefinitely     Replace K & Mg per protocol   Had Mg this am   Encourage nutrition and oral hydration  Continue abx.  Dosed for GFR 40-60        Leonides Victoria MD  [facility]   Office phone number: [phone]  Answering service phone number: [phone]    06/25/25  15:41 EDT    Dictation performed using Dragon dictation software

## 2025-06-25 NOTE — DISCHARGE SUMMARY
PHYSICIAN DISCHARGE SUMMARY                                                                        [facility]    Patient Identification:  Name: Candice Walker  Age: 89 y.o.  Sex: female  :  1935  MRN: [id number]  Primary Care Physician: Provider, No Known    Admit date: 2025  Discharge date and time:2025  Discharged Condition: good    Discharge Diagnoses:  Active Hospital Problems    Diagnosis  POA    **PHUONG (acute kidney injury) [N17.9]  Yes    DDD (degenerative disc disease), cervical [M50.30]  Unknown    COPD (chronic obstructive pulmonary disease) [J44.9]  Yes    Alcohol use [F10.90]  Yes    Acute UTI (urinary tract infection) [N39.0]  Yes    Hyponatremia [E87.1]  Yes    Hypomagnesemia [E83.42]  Yes    Hypertension [I10]  Yes    Anemia [D64.9]  Yes      Resolved Hospital Problems   No resolved problems to display.          PMHX:   Past Medical History:   Diagnosis Date    Anemia     Anxiety     Arthritis     Cancer 2006    BREAST CANCER, RIGHT MASTECTOMY    Chronic bronchitis     Depression     Disease of thyroid gland     GERD (gastroesophageal reflux disease)     Hard of hearing     Heart murmur     History of kidney stones     History of UTI 2017    HAD ABX    Hyperlipidemia     Hypertension     MRSA (methicillin resistant Staphylococcus aureus) infection     EARLY , HERE AT [facility], LEFT KNEE  , D/W GISELLE IN INFECTION CONTROL    RBBB     Risk factors for obstructive sleep apnea     Shoulder pain     LEFT     PSHX:   Past Surgical History:   Procedure Laterality Date    APPENDECTOMY      CATARACT EXTRACTION      WITH LENS IMPLANT    CHOLECYSTECTOMY      CYST REMOVAL      ON SPINE    EYE SURGERY      HYSTERECTOMY      JOINT REPLACEMENT      LT KNEE    MASTECTOMY Right 2006    MASTECTOMY RADICAL WITH AXILLARY NODE DISSECTION Right     THYROIDECTOMY      TOTAL SHOULDER ARTHROPLASTY W/ DISTAL CLAVICLE  EXCISION Left 4/12/2017    Procedure: LT TOTAL SHOULDER REVERSE ARTHROPLASTY;  Surgeon: Ezequiel Elizalde MD;  Location: [facility] OR [facility] [place], [place];  Service:        Hospital Course: Candice Walker   is a 89-year-old female with a past medical history of hypertension, hyperlipidemia, anemia, GERD, and hypothyroidism  presents to [facility] ED due to abnormal labs.  Patient reports she is having generalized pain and increased urinary frequency at nighttime.  She reports that she is up to the bathroom approximately every 30 minutes at night.  She denies any fever, chills, dizziness, shortness of breath, chest pain, abdominal pain, nausea, vomiting, diarrhea, or dysuria.  She reports that she has had headache but attributes that to cervical pain.  She reports that she has been eating normally except for today she has only eaten a chicken leg prior to coming to the hospital.  She reports that the SCDs make her legs hurt worse.  She is alert and oriented x 3.  The patient was admitted to the hospital and seen by neurosurgery and nephrology.  She was placed on a fluid restriction and her HCTZ was discontinued due to hyponatremia.  Neurosurgery recommended she just take some steroids or nonsteroidals for her neck pain and follow-up with pain management as outpatient.  She was pretty weak and after being in the hospital for couple days she looked well enough to go to rehab.  She will follow-up with her primary care after released from rehab.    Consults:     Consults       Date and Time Order Name Status Description    6/24/2025 10:44 AM Inpatient Neurosurgery Consult Completed     6/18/2025  8:50 AM Inpatient Nephrology Consult Completed     6/17/2025 10:28 PM [facility] (on-call MD unless specified) Details            Results from last 7 days   Lab Units 06/25/25  0445   WBC 10*3/mm3 5.08   HEMOGLOBIN g/dL 8.8*   HEMATOCRIT % 27.9*   PLATELETS 10*3/mm3 248     Results from last 7 days   Lab Units 06/25/25  0444   SODIUM mmol/L  "130*   POTASSIUM mmol/L 3.3*   CHLORIDE mmol/L 97*   CO2 mmol/L 22.9   BUN mg/dL 12.0   CREATININE mg/dL 0.75   GLUCOSE mg/dL 112*   CALCIUM mg/dL 8.6     Significant Diagnostic Studies:   WBC   Date Value Ref Range Status   06/25/2025 5.08 3.40 - 10.80 10*3/mm3 Final     Hemoglobin   Date Value Ref Range Status   06/25/2025 8.8 (L) 12.0 - 15.9 g/dL Final     Hematocrit   Date Value Ref Range Status   06/25/2025 27.9 (L) 34.0 - 46.6 % Final     Platelets   Date Value Ref Range Status   06/25/2025 248 140 - 450 10*3/mm3 Final     Sodium   Date Value Ref Range Status   06/25/2025 130 (L) 136 - 145 mmol/L Final     Potassium   Date Value Ref Range Status   06/25/2025 3.3 (L) 3.5 - 5.2 mmol/L Final     Chloride   Date Value Ref Range Status   06/25/2025 97 (L) 98 - 107 mmol/L Final     CO2   Date Value Ref Range Status   06/25/2025 22.9 22.0 - 29.0 mmol/L Final     BUN   Date Value Ref Range Status   06/25/2025 12.0 8.0 - 23.0 mg/dL Final     Creatinine   Date Value Ref Range Status   06/25/2025 0.75 0.57 - 1.00 mg/dL Final     Glucose   Date Value Ref Range Status   06/25/2025 112 (H) 65 - 99 mg/dL Final     Calcium   Date Value Ref Range Status   06/25/2025 8.6 8.6 - 10.5 mg/dL Final     Magnesium   Date Value Ref Range Status   06/25/2025 2.5 (H) 1.6 - 2.4 mg/dL Final     Phosphorus   Date Value Ref Range Status   06/25/2025 2.4 (L) 2.5 - 4.5 mg/dL Final     No results found for: \"AST\", \"ALT\", \"ALKPHOS\"  No results found for: \"APTT\", \"INR\"  No results found for: \"COLORU\", \"CLARITYU\", \"SPECGRAV\", \"PHUR\", \"PROTEINUR\", \"GLUCOSEU\", \"KETONESU\", \"BLOODU\", \"NITRITE\", \"LEUKOCYTESUR\", \"BILIRUBINUR\", \"UROBILINOGEN\", \"RBCUA\", \"WBCUA\", \"BACTERIA\", \"UACOMMENT\"  No results found for: \"TROPONINT\", \"TROPONINI\", \"BNP\"  No components found for: \"HGBA1C;2\"  No components found for: \"TSH;2\"  Imaging Results (All)       Procedure Component Value Units Date/Time    MRI Brain Without Contrast [[id number]] Collected: 06/24/25 8746     " Updated: 06/24/25 0427    Narrative:      BRAIN MRI WITHOUT CONTRAST     HISTORY: bilat hand weakness; N39.0-Urinary tract infection, site not  specified; E87.6-Phiw-kqxnangeiy and hyponatremia; N17.9-Acute kidney  failure, unspecified; R41.82-Altered mental status, unspecified     COMPARISON: October 1, 2023.     FINDINGS:  Multiplanar images of the head were obtained without  gadolinium. No areas of restricted diffusion are seen to suggest acute  infarct. There is atrophy. There is periventricular and deep white  matter microangiopathic change. There is no midline shift or mass  effect. There does appear to be some encephalomalacia within the left  temporal lobe. Intracranial flow voids appear intact. There is a small  focus of susceptibility artifact noted within the left cerebellar  hemisphere. 2 additional small foci of susceptibility artifact are noted  within the left cerebral hemisphere. Mucosal thickening is noted within  the paranasal sinuses.       Impression:      1. No acute intracranial abnormality.        This report was finalized on 6/24/2025 4:24 AM by Dr. Barbara Allan M.D on Workstation: [id number]       MRI Cervical Spine Without Contrast [[id number]] Collected: 06/24/25 0415     Updated: 06/24/25 0423    Narrative:      CERVICAL SPINE MRI WITHOUT GADOLINIUM     HISTORY: cervical degen on outpatient CT, bilateral hand weakness and  neck pain; N39.0-Urinary tract infection, site not specified;  E87.3-Yptc-ywnyeawbjb and hyponatremia; N17.9-Acute kidney failure,  unspecified; R41.82-Altered mental status, unspecified     COMPARISON:  None.     FINDINGS:  Multiplanar images of the cervical spine obtained without  gadolinium.  Axial images obtained from C2-T1.     No acute fracture or subluxation of the cervical spine is seen. The  patient has rather prominent pannus formation noted at C2. There is mild  anterolisthesis of C3 on C4. There is intervertebral disc space  narrowing noted at  multiple levels. No definite cord signal  abnormalities are seen. No acute marrow signal abnormalities are seen.     C2-C3: There is no significant canal stenosis. There is neuroforaminal  narrowing on the left.  C3-C4: Disc osteophyte complex results in moderate central canal  stenosis. There is severe bilateral neuroforaminal narrowing, although  more significant on the left.  C4-C5: Disc osteophyte complex results in mild canal stenosis. There is  severe bilateral neuroforaminal narrowing.  C5-C6: Disc osteophyte complex results in mild canal stenosis. There is  moderate bilateral neuroforaminal narrowing.  C6-C7: Disc osteophyte complex results in mild canal stenosis. There is  moderate neuroforaminal narrowing on the right. There is moderate to  severe neuroforaminal narrowing on the left.  C7-T1: There is no canal stenosis or neuroforaminal narrowing.       Impression:      1. Degenerative changes, as noted above. Appearance is similar to prior  CT from April 11, 2025.           This report was finalized on 6/24/2025 4:20 AM by Dr. Barbara Allan M.D on Workstation: [id number]       MRI Outside Films [[id number]] Resulted: 06/23/25 2130     Updated: 06/23/25 2130    Narrative:      This procedure was auto-finalized with no dictation required.    CT Outside Films [[id number]] Resulted: 06/23/25 2128     Updated: 06/23/25 2128    Narrative:      This procedure was auto-finalized with no dictation required.    XR Hand 2 View Bilateral [[id number]] Collected: 06/23/25 1515     Updated: 06/23/25 1533    Narrative:      XR HAND 2 VW BILATERAL-     INDICATION: Arthritis     COMPARISON: None available       Impression:      Advanced arthritis throughout the bilateral hands and  wrists. Erosions are present at the bilateral wrists including the left  ulnar styloid process and and a few metacarpophalangeal joints, most  pronounced at the fourth left metacarpal head, suspicious for rheumatoid  arthritis. Advanced  bilateral IP joint osteoarthritis is present with  few gullwing deformities most prominent at the third left proximal  interphalangeal joint, suspicious for erosive osteoarthritis. Flexion  deformities of the bilateral phalanges. No appreciable fracture.  Alignment limits evaluation for fracture.           This report was finalized on 6/23/2025 3:30 PM by Dr. Manjinder Wells M.D on Workstation: [email or web address]             Lab Results (last 7 days)       Procedure Component Value Units Date/Time    Rheumatoid Arthritis (RA) Profile [[id number]] Collected: 06/24/25 0528    Specimen: Blood Updated: 06/25/25 1211     RA Latex Turbid 10.5 IU/mL      CCP Antibodies IgG/IgA 6 units      Comment:                           Negative               <20                            Weak positive      20 - 39                            Moderate positive  40 - 59                            Strong positive        >59       Narrative:      Performed at:  [address] [place] [address]  [id number]  : Luis De La Torre PhD, Phone:  [phone]    Magnesium [[id number]]  (Abnormal) Collected: 06/25/25 0444    Specimen: Blood Updated: 06/25/25 0544     Magnesium 2.5 mg/dL     Renal Function Panel [[id number]]  (Abnormal) Collected: 06/25/25 0444    Specimen: Blood Updated: 06/25/25 0540     Glucose 112 mg/dL      BUN 12.0 mg/dL      Creatinine 0.75 mg/dL      Sodium 130 mmol/L      Potassium 3.3 mmol/L      Chloride 97 mmol/L      CO2 22.9 mmol/L      Calcium 8.6 mg/dL      Albumin 2.8 g/dL      Phosphorus 2.4 mg/dL      Anion Gap 10.1 mmol/L      BUN/Creatinine Ratio 16.0     eGFR 76.2 mL/min/1.73     Narrative:      GFR Categories in Chronic Kidney Disease (CKD)              GFR Category          GFR (mL/min/1.73)    Interpretation  G1                    90 or greater        Normal or high (1)  G2                    60-89                Mild decrease (1)  G3a                   45-59                Mild to moderate  decrease  G3b                   30-44                Moderate to severe decrease  G4                    15-29                Severe decrease  G5                    14 or less           Kidney failure    (1)In the absence of evidence of kidney disease, neither GFR category G1 or G2 fulfill the criteria for CKD.    eGFR calculation 2021 CKD-EPI creatinine equation, which does not include race as a factor    Uric Acid [[id number]]  (Normal) Collected: 06/25/25 0444    Specimen: Blood Updated: 06/25/25 0540     Uric Acid 4.3 mg/dL     CBC (No Diff) [[id number]]  (Abnormal) Collected: 06/25/25 0445    Specimen: Blood Updated: 06/25/25 0517     WBC 5.08 10*3/mm3      RBC 3.29 10*6/mm3      Hemoglobin 8.8 g/dL      Hematocrit 27.9 %      MCV 84.8 fL      MCH 26.7 pg      MCHC 31.5 g/dL      RDW 14.5 %      RDW-SD 44.6 fl      MPV 9.0 fL      Platelets 248 10*3/mm3     Vitamin B12 [[id number]]  (Normal) Collected: 06/24/25 0528    Specimen: Blood Updated: 06/24/25 0625     Vitamin B-12 316 pg/mL     Narrative:      Results may be falsely increased if patient taking Biotin.      Folate [[id number]]  (Normal) Collected: 06/24/25 0528    Specimen: Blood Updated: 06/24/25 0625     Folate 8.20 ng/mL     Narrative:      Results may be falsely increased if patient taking Biotin.      Magnesium [[id number]]  (Abnormal) Collected: 06/24/25 0528    Specimen: Blood Updated: 06/24/25 0615     Magnesium 1.3 mg/dL     Renal Function Panel [[id number]]  (Abnormal) Collected: 06/24/25 0528    Specimen: Blood Updated: 06/24/25 0610     Glucose 118 mg/dL      BUN 12.0 mg/dL      Creatinine 0.80 mg/dL      Sodium 128 mmol/L      Potassium 3.8 mmol/L      Chloride 94 mmol/L      CO2 21.6 mmol/L      Calcium 9.2 mg/dL      Albumin 3.4 g/dL      Phosphorus 2.8 mg/dL      Anion Gap 12.4 mmol/L      BUN/Creatinine Ratio 15.0     eGFR 70.5 mL/min/1.73     Narrative:      GFR Categories in Chronic Kidney Disease (CKD)              GFR Category           GFR (mL/min/1.73)    Interpretation  G1                    90 or greater        Normal or high (1)  G2                    60-89                Mild decrease (1)  G3a                   45-59                Mild to moderate decrease  G3b                   30-44                Moderate to severe decrease  G4                    15-29                Severe decrease  G5                    14 or less           Kidney failure    (1)In the absence of evidence of kidney disease, neither GFR category G1 or G2 fulfill the criteria for CKD.    eGFR calculation 2021 CKD-EPI creatinine equation, which does not include race as a factor    Hemoglobin A1c [[id number]]  (Normal) Collected: 06/24/25 0529    Specimen: Blood Updated: 06/24/25 0559     Hemoglobin A1C 5.40 %     Narrative:      Hemoglobin A1C Ranges:    Increased Risk for Diabetes  5.7% to 6.4%  Diabetes                     >= 6.5%  Diabetic Goal                < 7.0%    CBC & Differential [[id number]]  (Abnormal) Collected: 06/24/25 0528    Specimen: Blood Updated: 06/24/25 0547    Narrative:      The following orders were created for panel order CBC & Differential.  Procedure                               Abnormality         Status                     ---------                               -----------         ------                     CBC Auto Differential[[id number]]        Abnormal            Final result                 Please view results for these tests on the individual orders.    CBC Auto Differential [[id number]]  (Abnormal) Collected: 06/24/25 0528    Specimen: Blood Updated: 06/24/25 0547     WBC 3.14 10*3/mm3      RBC 3.60 10*6/mm3      Hemoglobin 9.5 g/dL      Hematocrit 30.7 %      MCV 85.3 fL      MCH 26.4 pg      MCHC 30.9 g/dL      RDW 14.1 %      RDW-SD 43.9 fl      MPV 9.0 fL      Platelets 267 10*3/mm3      Neutrophil % 72.6 %      Lymphocyte % 17.5 %      Monocyte % 9.6 %      Eosinophil % 0.0 %      Basophil % 0.3 %      Immature Grans % 0.0 %       Neutrophils, Absolute 2.28 10*3/mm3      Lymphocytes, Absolute 0.55 10*3/mm3      Monocytes, Absolute 0.30 10*3/mm3      Eosinophils, Absolute 0.00 10*3/mm3      Basophils, Absolute 0.01 10*3/mm3      Immature Grans, Absolute 0.00 10*3/mm3      nRBC 0.0 /100 WBC     Renal Function Panel [[id number]]  (Abnormal) Collected: 06/23/25 0653    Specimen: Blood Updated: 06/23/25 0733     Glucose 76 mg/dL      BUN 13.0 mg/dL      Creatinine 0.87 mg/dL      Sodium 131 mmol/L      Potassium 3.8 mmol/L      Chloride 97 mmol/L      CO2 22.9 mmol/L      Calcium 9.3 mg/dL      Albumin 2.7 g/dL      Phosphorus 3.3 mg/dL      Anion Gap 11.1 mmol/L      BUN/Creatinine Ratio 14.9     eGFR 63.8 mL/min/1.73     Narrative:      GFR Categories in Chronic Kidney Disease (CKD)              GFR Category          GFR (mL/min/1.73)    Interpretation  G1                    90 or greater        Normal or high (1)  G2                    60-89                Mild decrease (1)  G3a                   45-59                Mild to moderate decrease  G3b                   30-44                Moderate to severe decrease  G4                    15-29                Severe decrease  G5                    14 or less           Kidney failure    (1)In the absence of evidence of kidney disease, neither GFR category G1 or G2 fulfill the criteria for CKD.    eGFR calculation 2021 CKD-EPI creatinine equation, which does not include race as a factor    Renal Function Panel [[id number]]  (Abnormal) Collected: 06/22/25 0526    Specimen: Blood Updated: 06/22/25 0637     Glucose 84 mg/dL      BUN 14.0 mg/dL      Creatinine 0.96 mg/dL      Sodium 130 mmol/L      Potassium 3.7 mmol/L      Chloride 97 mmol/L      CO2 24.0 mmol/L      Calcium 9.1 mg/dL      Albumin 2.8 g/dL      Phosphorus 3.4 mg/dL      Anion Gap 9.0 mmol/L      BUN/Creatinine Ratio 14.6     eGFR 56.7 mL/min/1.73     Narrative:      GFR Categories in Chronic Kidney Disease (CKD)              GFR Category           GFR (mL/min/1.73)    Interpretation  G1                    90 or greater        Normal or high (1)  G2                    60-89                Mild decrease (1)  G3a                   45-59                Mild to moderate decrease  G3b                   30-44                Moderate to severe decrease  G4                    15-29                Severe decrease  G5                    14 or less           Kidney failure    (1)In the absence of evidence of kidney disease, neither GFR category G1 or G2 fulfill the criteria for CKD.    eGFR calculation 2021 CKD-EPI creatinine equation, which does not include race as a factor    Renal Function Panel [[id number]]  (Abnormal) Collected: 06/21/25 0339    Specimen: Blood Updated: 06/21/25 0501     Glucose 96 mg/dL      BUN 16.0 mg/dL      Creatinine 0.86 mg/dL      Sodium 129 mmol/L      Potassium 3.5 mmol/L      Chloride 94 mmol/L      CO2 22.0 mmol/L      Calcium 9.3 mg/dL      Albumin 3.1 g/dL      Phosphorus 2.7 mg/dL      Anion Gap 13.0 mmol/L      BUN/Creatinine Ratio 18.6     eGFR 64.7 mL/min/1.73     Narrative:      GFR Categories in Chronic Kidney Disease (CKD)              GFR Category          GFR (mL/min/1.73)    Interpretation  G1                    90 or greater        Normal or high (1)  G2                    60-89                Mild decrease (1)  G3a                   45-59                Mild to moderate decrease  G3b                   30-44                Moderate to severe decrease  G4                    15-29                Severe decrease  G5                    14 or less           Kidney failure    (1)In the absence of evidence of kidney disease, neither GFR category G1 or G2 fulfill the criteria for CKD.    eGFR calculation 2021 CKD-EPI creatinine equation, which does not include race as a factor    Renal Function Panel [[id number]]  (Abnormal) Collected: 06/20/25 0351    Specimen: Blood Updated: 06/20/25 0521     Glucose 90 mg/dL      BUN 23.0  mg/dL      Creatinine 0.86 mg/dL      Sodium 131 mmol/L      Potassium 4.1 mmol/L      Chloride 98 mmol/L      CO2 22.8 mmol/L      Calcium 9.1 mg/dL      Albumin 2.9 g/dL      Phosphorus 2.6 mg/dL      Anion Gap 10.2 mmol/L      BUN/Creatinine Ratio 26.7     eGFR 64.7 mL/min/1.73     Narrative:      GFR Categories in Chronic Kidney Disease (CKD)              GFR Category          GFR (mL/min/1.73)    Interpretation  G1                    90 or greater        Normal or high (1)  G2                    60-89                Mild decrease (1)  G3a                   45-59                Mild to moderate decrease  G3b                   30-44                Moderate to severe decrease  G4                    15-29                Severe decrease  G5                    14 or less           Kidney failure    (1)In the absence of evidence of kidney disease, neither GFR category G1 or G2 fulfill the criteria for CKD.    eGFR calculation 2021 CKD-EPI creatinine equation, which does not include race as a factor    Magnesium [[id number]]  (Normal) Collected: 06/20/25 0351    Specimen: Blood Updated: 06/20/25 0519     Magnesium 2.3 mg/dL     Basic Metabolic Panel [[id number]]  (Abnormal) Collected: 06/19/25 1210    Specimen: Blood Updated: 06/19/25 1253     Glucose 95 mg/dL      BUN 27.0 mg/dL      Creatinine 1.03 mg/dL      Sodium 127 mmol/L      Potassium 2.7 mmol/L      Chloride 93 mmol/L      CO2 22.2 mmol/L      Calcium 8.6 mg/dL      BUN/Creatinine Ratio 26.2     Anion Gap 11.8 mmol/L      eGFR 52.1 mL/min/1.73     Narrative:      GFR Categories in Chronic Kidney Disease (CKD)              GFR Category          GFR (mL/min/1.73)    Interpretation  G1                    90 or greater        Normal or high (1)  G2                    60-89                Mild decrease (1)  G3a                   45-59                Mild to moderate decrease  G3b                   30-44                Moderate to severe decrease  G4                     15-29                Severe decrease  G5                    14 or less           Kidney failure    (1)In the absence of evidence of kidney disease, neither GFR category G1 or G2 fulfill the criteria for CKD.    eGFR calculation 2021 CKD-EPI creatinine equation, which does not include race as a factor    CBC & Differential [[id number]]  (Abnormal) Collected: 06/19/25 1210    Specimen: Blood Updated: 06/19/25 1229    Narrative:      The following orders were created for panel order CBC & Differential.  Procedure                               Abnormality         Status                     ---------                               -----------         ------                     CBC Auto Differential[[id number]]        Abnormal            Final result                 Please view results for these tests on the individual orders.    CBC Auto Differential [[id number]]  (Abnormal) Collected: 06/19/25 1210    Specimen: Blood Updated: 06/19/25 1229     WBC 5.46 10*3/mm3      RBC 3.07 10*6/mm3      Hemoglobin 8.4 g/dL      Hematocrit 26.2 %      MCV 85.3 fL      MCH 27.4 pg      MCHC 32.1 g/dL      RDW 14.5 %      RDW-SD 45.5 fl      MPV 9.4 fL      Platelets 221 10*3/mm3      Neutrophil % 78.2 %      Lymphocyte % 7.1 %      Monocyte % 11.2 %      Eosinophil % 2.7 %      Basophil % 0.4 %      Immature Grans % 0.4 %      Neutrophils, Absolute 4.27 10*3/mm3      Lymphocytes, Absolute 0.39 10*3/mm3      Monocytes, Absolute 0.61 10*3/mm3      Eosinophils, Absolute 0.15 10*3/mm3      Basophils, Absolute 0.02 10*3/mm3      Immature Grans, Absolute 0.02 10*3/mm3      nRBC 0.0 /100 WBC     Creatinine Urine Random (kidney function) GFR component - Urine, Catheter [[id number]] Collected: 06/17/25 2201    Specimen: Urine, Catheter Updated: 06/18/25 1341     Creatinine, Urine 38.7 mg/dL     Narrative:      Reference intervals for random urine have not been established.  Clinical usage is dependent upon physician's interpretation in combination  "with other laboratory tests.             /68 (BP Location: Left arm, Patient Position: Sitting)   Pulse 52   Temp 96.8 °F (36 °C) (Oral)   Resp 16   Ht 157.5 cm (62.01\")   Wt 64.2 kg (141 lb 8.6 oz)   SpO2 95%   BMI 25.88 kg/m²     Discharge Exam:  General Appearance:    Alert, cooperative, no distress                          Head:    Normocephalic, without obvious abnormality, atraumatic                          Eyes:                            Throat:   Lips, tongue, gums normal                          Neck:   Supple, symmetrical, trachea midline, no JVD                        Lungs:     Clear to auscultation bilaterally, respirations unlabored                Chest Wall:    No tenderness or deformity                        Heart:    Regular rate and rhythm, S1 and S2 normal, no murmur,no  Rub  or gallop                  Abdomen:     Soft, non-tender, bowel sounds active, no masses, no                                                        organomegaly                  Extremities:   Extremities normal, atraumatic, no cyanosis or edema                             Skin:   Skin is warm and dry,  no rashes or palpable lesions                  Neurologic:   no focal deficits noted     Disposition:  Skilled nursing facility    Activity as tolerated    Diet as tolerated  Diet Order   Procedures    Diet: Cardiac, Fluid Restriction (240 mL/tray); Healthy Heart (2-3 Na+); 1500 mL/day; Fluid Consistency: Thin (IDDSI 0)       Patient Instructions:      Discharge Medications        New Medications        Instructions Start Date   amLODIPine 5 MG tablet 5 mg, valsartan 160 MG tablet 160 mg   1 dose, Oral, Daily      folic acid 1 MG tablet  Commonly known as: FOLVITE   1 mg, Oral, Daily   Start Date: June 26, 2025     HYDROcodone-acetaminophen 5-325 MG per tablet  Commonly known as: NORCO   1 tablet, Oral, Every 4 Hours PRN      predniSONE 10 MG tablet  Commonly known as: DELTASONE   10 mg, Oral, Daily         "     Continue These Medications        Instructions Start Date   albuterol sulfate  (90 Base) MCG/ACT inhaler  Commonly known as: PROVENTIL HFA;VENTOLIN HFA;PROAIR HFA   2 puffs, Inhalation, Every 4 Hours PRN      bisoprolol 5 MG tablet  Commonly known as: ZEBeta   5 mg, Daily      Breo Ellipta 200-25 MCG/ACT inhaler  Generic drug: Fluticasone Furoate-Vilanterol   1 puff, Nightly      calcium carbonate 600 MG tablet  Commonly known as: OS-RYAN   600 mg, Oral, 2 Times Daily      ferrous sulfate 325 (65 Fe) MG tablet   1 tablet, Daily      levothyroxine 125 MCG tablet  Commonly known as: SYNTHROID, LEVOTHROID   125 mcg, Daily      multivitamin with minerals tablet tablet   1 tablet, Oral, Daily, STOPPED PREOP, LAST DOSE 3/29/17      omeprazole 20 MG capsule  Commonly known as: priLOSEC   20 mg, Daily      sertraline 100 MG tablet  Commonly known as: ZOLOFT   100 mg, Daily      Zocor 20 MG tablet  Generic drug: simvastatin   20 mg, Nightly             Stop These Medications      Ambien 10 MG tablet  Generic drug: zolpidem     amLODIPine 2.5 MG tablet  Commonly known as: NORVASC     amoxicillin-clavulanate 875-125 MG per tablet  Commonly known as: AUGMENTIN     celecoxib 200 MG capsule  Commonly known as: CeleBREX     Exforge 5-160 MG per tablet  Generic drug: amLODIPine-valsartan     furosemide 40 MG tablet  Commonly known as: LASIX     hydroCHLOROthiazide 12.5 MG tablet     irbesartan 300 MG tablet  Commonly known as: AVAPRO     oxyCODONE-acetaminophen 5-325 MG per tablet  Commonly known as: PERCOCET     potassium chloride 10 MEQ CR tablet     traMADol 50 MG tablet  Commonly known as: ULTRAM            No future appointments.   Contact information for follow-up providers       Provider, No Known Follow up.    Why: Follow-up with primary care after released from rehab.  Contact information:  [facility] [address] [phone]                       Contact information for after-discharge care        Destination       [facility] .    Service: Skilled Nursing  Contact information:  Richi Aguayo [facility] [address]  [phone]                                 Discharge Order (From admission, onward)       Start     Ordered    06/25/25 1302  Discharge patient  Once        Expected Discharge Date: 06/25/25   Discharge Disposition: Skilled Nursing Facility (DC - External)   Physician of Record for Attribution - Please select from Treatment Team: GABE SHANKAR [[id number]]   Review needed by CMO to determine Physician of Record: No      Question Answer Comment   Physician of Record for Attribution - Please select from Treatment Team GABE SHANKAR    Review needed by CMO to determine Physician of Record No        06/25/25 1311                    Total time spent discharging patient including evaluation,post hospitalization follow up,  medication and post hospitalization instructions and education total time exceeds 30 minutes.    Signed:  Gabe Shankar MD  6/25/2025  13:12 EDT

## 2025-06-25 NOTE — NURSING NOTE
HR bradycardic otherwise VSS. Pt was on fall precautions. Up with assist and walker to BS. Discharged to [facility].

## 2025-06-25 NOTE — PLAN OF CARE
Problem: Adult Inpatient Plan of Care  Goal: Plan of Care Review  Outcome: Progressing  Flowsheets (Taken 6/25/2025 5906)  Progress: improving  Outcome Evaluation: sat up in the chair until late at night, medicated with Norco once for c/o Rt knee pain and neck pain, assisted to the BSC, falls precaution maintained, finally slept at around 0300 am, sat 95% in room air, no SOA noted, possible D/C today to [facility]  Plan of Care Reviewed With: patient   Goal Outcome Evaluation:  Plan of Care Reviewed With: patient        Progress: improving  Outcome Evaluation: sat up in the chair until late at night, medicated with Norco once for c/o Rt knee pain and neck pain, assisted to the BSC, falls precaution maintained, finally slept at around 0300 am, sat 95% in room air, no SOA noted, possible D/C today to [facility]

## 2025-06-26 NOTE — CASE MANAGEMENT/SOCIAL WORK
Case Management Discharge Note      Final Note: [facility] via daughter transport         Selected Continued Care - Discharged on 6/25/2025 Admission date: 6/17/2025 - Discharge disposition: Skilled Nursing Facility (DC - External)      Destination Coordination complete.      Service Provider Services Address Phone Fax Patient Preferred    [facility] [address] [facility] [address] [phone] --              Durable Medical Equipment    No services have been selected for the patient.                Dialysis/Infusion    No services have been selected for the patient.                Home Medical Care    No services have been selected for the patient.                Therapy    No services have been selected for the patient.                Community Resources    No services have been selected for the patient.                Community & DME    No services have been selected for the patient.                    Transportation Services  Transportation: Private Transportation  Private: Car    Final Discharge Disposition Code: 03 - skilled nursing facility (SNF)

## 2025-07-28 ENCOUNTER — HOSPITAL ENCOUNTER (OUTPATIENT)
Facility: HOSPITAL | Age: OVER 89
Setting detail: OBSERVATION
Discharge: HOME HEALTH | End: 2025-08-02
Attending: EMERGENCY MEDICINE | Admitting: HOSPITALIST
Payer: MEDICARE

## 2025-07-28 DIAGNOSIS — N17.9 ACUTE RENAL FAILURE, UNSPECIFIED ACUTE RENAL FAILURE TYPE: Primary | ICD-10-CM

## 2025-07-28 DIAGNOSIS — E87.6 HYPOKALEMIA: ICD-10-CM

## 2025-07-28 DIAGNOSIS — E83.42 HYPOMAGNESEMIA: ICD-10-CM

## 2025-07-28 DIAGNOSIS — E87.1 HYPONATREMIA: ICD-10-CM

## 2025-07-28 DIAGNOSIS — M19.90 ARTHRITIS: ICD-10-CM

## 2025-07-28 LAB
ALBUMIN SERPL-MCNC: 3.3 G/DL (ref 3.5–5.2)
ALBUMIN/GLOB SERPL: 1 G/DL
ALP SERPL-CCNC: 128 U/L (ref 39–117)
ALT SERPL W P-5'-P-CCNC: 7 U/L (ref 1–33)
ANION GAP SERPL CALCULATED.3IONS-SCNC: 17.6 MMOL/L (ref 5–15)
AST SERPL-CCNC: 15 U/L (ref 1–32)
BACTERIA UR QL AUTO: ABNORMAL /HPF
BASOPHILS # BLD AUTO: 0.02 10*3/MM3 (ref 0–0.2)
BASOPHILS NFR BLD AUTO: 0.3 % (ref 0–1.5)
BILIRUB SERPL-MCNC: 0.4 MG/DL (ref 0–1.2)
BILIRUB UR QL STRIP: NEGATIVE
BUN SERPL-MCNC: 32 MG/DL (ref 8–23)
BUN/CREAT SERPL: 21.8 (ref 7–25)
CALCIUM SPEC-SCNC: 8.9 MG/DL (ref 8.2–9.6)
CHLORIDE SERPL-SCNC: 97 MMOL/L (ref 98–107)
CLARITY UR: CLEAR
CO2 SERPL-SCNC: 20.4 MMOL/L (ref 22–29)
COLOR UR: YELLOW
CREAT SERPL-MCNC: 1.47 MG/DL (ref 0.57–1)
DEPRECATED RDW RBC AUTO: 47.3 FL (ref 37–54)
EGFRCR SERPLBLD CKD-EPI 2021: 33.8 ML/MIN/1.73
EOSINOPHIL # BLD AUTO: 0.23 10*3/MM3 (ref 0–0.4)
EOSINOPHIL NFR BLD AUTO: 4 % (ref 0.3–6.2)
ERYTHROCYTE [DISTWIDTH] IN BLOOD BY AUTOMATED COUNT: 15 % (ref 12.3–15.4)
GLOBULIN UR ELPH-MCNC: 3.3 GM/DL
GLUCOSE SERPL-MCNC: 95 MG/DL (ref 65–99)
GLUCOSE UR STRIP-MCNC: NEGATIVE MG/DL
HCT VFR BLD AUTO: 30.7 % (ref 34–46.6)
HGB BLD-MCNC: 9.3 G/DL (ref 12–15.9)
HGB UR QL STRIP.AUTO: NEGATIVE
HOLD SPECIMEN: NORMAL
HOLD SPECIMEN: NORMAL
HYALINE CASTS UR QL AUTO: ABNORMAL /LPF
IMM GRANULOCYTES # BLD AUTO: 0.03 10*3/MM3 (ref 0–0.05)
IMM GRANULOCYTES NFR BLD AUTO: 0.5 % (ref 0–0.5)
KETONES UR QL STRIP: NEGATIVE
LEUKOCYTE ESTERASE UR QL STRIP.AUTO: ABNORMAL
LYMPHOCYTES # BLD AUTO: 0.75 10*3/MM3 (ref 0.7–3.1)
LYMPHOCYTES NFR BLD AUTO: 12.9 % (ref 19.6–45.3)
MAGNESIUM SERPL-MCNC: 1.1 MG/DL (ref 1.6–2.4)
MCH RBC QN AUTO: 26.6 PG (ref 26.6–33)
MCHC RBC AUTO-ENTMCNC: 30.3 G/DL (ref 31.5–35.7)
MCV RBC AUTO: 88 FL (ref 79–97)
MONOCYTES # BLD AUTO: 0.56 10*3/MM3 (ref 0.1–0.9)
MONOCYTES NFR BLD AUTO: 9.6 % (ref 5–12)
NEUTROPHILS NFR BLD AUTO: 4.22 10*3/MM3 (ref 1.7–7)
NEUTROPHILS NFR BLD AUTO: 72.7 % (ref 42.7–76)
NITRITE UR QL STRIP: NEGATIVE
NRBC BLD AUTO-RTO: 0 /100 WBC (ref 0–0.2)
OSMOLALITY SERPL: 288 MOSM/KG (ref 280–301)
OSMOLALITY UR: 282 MOSM/KG
PH UR STRIP.AUTO: 6 [PH] (ref 5–8)
PLATELET # BLD AUTO: 261 10*3/MM3 (ref 140–450)
PMV BLD AUTO: 9.3 FL (ref 6–12)
POTASSIUM SERPL-SCNC: 3 MMOL/L (ref 3.5–5.2)
PROT SERPL-MCNC: 6.6 G/DL (ref 6–8.5)
PROT UR QL STRIP: NEGATIVE
RBC # BLD AUTO: 3.49 10*6/MM3 (ref 3.77–5.28)
RBC # UR STRIP: ABNORMAL /HPF
REF LAB TEST METHOD: ABNORMAL
SODIUM SERPL-SCNC: 135 MMOL/L (ref 136–145)
SODIUM UR-SCNC: 108 MMOL/L
SP GR UR STRIP: 1.01 (ref 1–1.03)
SQUAMOUS #/AREA URNS HPF: ABNORMAL /HPF
UROBILINOGEN UR QL STRIP: ABNORMAL
WBC # UR STRIP: ABNORMAL /HPF
WBC NRBC COR # BLD AUTO: 5.81 10*3/MM3 (ref 3.4–10.8)
WHOLE BLOOD HOLD COAG: NORMAL
WHOLE BLOOD HOLD SPECIMEN: NORMAL

## 2025-07-28 PROCEDURE — 25010000002 MAGNESIUM SULFATE 2 GM/50ML SOLUTION: Performed by: EMERGENCY MEDICINE

## 2025-07-28 PROCEDURE — 25810000003 SODIUM CHLORIDE 0.9 % SOLUTION: Performed by: EMERGENCY MEDICINE

## 2025-07-28 PROCEDURE — 80053 COMPREHEN METABOLIC PANEL: CPT | Performed by: NURSE PRACTITIONER

## 2025-07-28 PROCEDURE — G0378 HOSPITAL OBSERVATION PER HR: HCPCS

## 2025-07-28 PROCEDURE — 83735 ASSAY OF MAGNESIUM: CPT | Performed by: NURSE PRACTITIONER

## 2025-07-28 PROCEDURE — 99285 EMERGENCY DEPT VISIT HI MDM: CPT

## 2025-07-28 PROCEDURE — 36415 COLL VENOUS BLD VENIPUNCTURE: CPT

## 2025-07-28 PROCEDURE — 85025 COMPLETE CBC W/AUTO DIFF WBC: CPT | Performed by: EMERGENCY MEDICINE

## 2025-07-28 PROCEDURE — 85027 COMPLETE CBC AUTOMATED: CPT | Performed by: NURSE PRACTITIONER

## 2025-07-28 PROCEDURE — 83930 ASSAY OF BLOOD OSMOLALITY: CPT | Performed by: EMERGENCY MEDICINE

## 2025-07-28 PROCEDURE — 96365 THER/PROPH/DIAG IV INF INIT: CPT

## 2025-07-28 PROCEDURE — 25810000003 SODIUM CHLORIDE 0.9 % SOLUTION: Performed by: NURSE PRACTITIONER

## 2025-07-28 PROCEDURE — 84300 ASSAY OF URINE SODIUM: CPT | Performed by: EMERGENCY MEDICINE

## 2025-07-28 PROCEDURE — P9612 CATHETERIZE FOR URINE SPEC: HCPCS

## 2025-07-28 PROCEDURE — 81001 URINALYSIS AUTO W/SCOPE: CPT | Performed by: EMERGENCY MEDICINE

## 2025-07-28 PROCEDURE — 83735 ASSAY OF MAGNESIUM: CPT | Performed by: EMERGENCY MEDICINE

## 2025-07-28 PROCEDURE — 83935 ASSAY OF URINE OSMOLALITY: CPT | Performed by: EMERGENCY MEDICINE

## 2025-07-28 PROCEDURE — 80053 COMPREHEN METABOLIC PANEL: CPT | Performed by: EMERGENCY MEDICINE

## 2025-07-28 RX ORDER — SODIUM CHLORIDE 0.9 % (FLUSH) 0.9 %
10 SYRINGE (ML) INJECTION AS NEEDED
Status: DISCONTINUED | OUTPATIENT
Start: 2025-07-28 | End: 2025-07-29

## 2025-07-28 RX ORDER — BISACODYL 10 MG
10 SUPPOSITORY, RECTAL RECTAL DAILY PRN
Status: DISCONTINUED | OUTPATIENT
Start: 2025-07-28 | End: 2025-08-02 | Stop reason: HOSPADM

## 2025-07-28 RX ORDER — SODIUM CHLORIDE 9 MG/ML
40 INJECTION, SOLUTION INTRAVENOUS AS NEEDED
Status: DISCONTINUED | OUTPATIENT
Start: 2025-07-28 | End: 2025-07-29

## 2025-07-28 RX ORDER — ACETAMINOPHEN 325 MG/1
650 TABLET ORAL EVERY 4 HOURS PRN
Status: DISCONTINUED | OUTPATIENT
Start: 2025-07-28 | End: 2025-08-02 | Stop reason: HOSPADM

## 2025-07-28 RX ORDER — FAMOTIDINE 20 MG/1
20 TABLET, FILM COATED ORAL 2 TIMES DAILY PRN
Status: DISCONTINUED | OUTPATIENT
Start: 2025-07-28 | End: 2025-07-29

## 2025-07-28 RX ORDER — ACETAMINOPHEN 160 MG/5ML
650 SOLUTION ORAL EVERY 4 HOURS PRN
Status: DISCONTINUED | OUTPATIENT
Start: 2025-07-28 | End: 2025-07-29

## 2025-07-28 RX ORDER — NITROGLYCERIN 0.4 MG/1
0.4 TABLET SUBLINGUAL
Status: DISCONTINUED | OUTPATIENT
Start: 2025-07-28 | End: 2025-07-29

## 2025-07-28 RX ORDER — BISACODYL 5 MG/1
5 TABLET, DELAYED RELEASE ORAL DAILY PRN
Status: DISCONTINUED | OUTPATIENT
Start: 2025-07-28 | End: 2025-08-02 | Stop reason: HOSPADM

## 2025-07-28 RX ORDER — SODIUM CHLORIDE 9 MG/ML
100 INJECTION, SOLUTION INTRAVENOUS CONTINUOUS
Status: DISCONTINUED | OUTPATIENT
Start: 2025-07-28 | End: 2025-07-29

## 2025-07-28 RX ORDER — POLYETHYLENE GLYCOL 3350 17 G/17G
17 POWDER, FOR SOLUTION ORAL DAILY PRN
Status: DISCONTINUED | OUTPATIENT
Start: 2025-07-28 | End: 2025-08-02 | Stop reason: HOSPADM

## 2025-07-28 RX ORDER — POTASSIUM CHLORIDE 1500 MG/1
40 TABLET, EXTENDED RELEASE ORAL ONCE
Status: COMPLETED | OUTPATIENT
Start: 2025-07-28 | End: 2025-07-28

## 2025-07-28 RX ORDER — AMOXICILLIN 250 MG
2 CAPSULE ORAL 2 TIMES DAILY PRN
Status: DISCONTINUED | OUTPATIENT
Start: 2025-07-28 | End: 2025-08-02 | Stop reason: HOSPADM

## 2025-07-28 RX ORDER — ACETAMINOPHEN 650 MG/1
650 SUPPOSITORY RECTAL EVERY 4 HOURS PRN
Status: DISCONTINUED | OUTPATIENT
Start: 2025-07-28 | End: 2025-07-29

## 2025-07-28 RX ORDER — MAGNESIUM SULFATE HEPTAHYDRATE 40 MG/ML
2 INJECTION, SOLUTION INTRAVENOUS ONCE
Status: COMPLETED | OUTPATIENT
Start: 2025-07-28 | End: 2025-07-29

## 2025-07-28 RX ORDER — ONDANSETRON 2 MG/ML
4 INJECTION INTRAMUSCULAR; INTRAVENOUS EVERY 6 HOURS PRN
Status: DISCONTINUED | OUTPATIENT
Start: 2025-07-28 | End: 2025-08-02 | Stop reason: HOSPADM

## 2025-07-28 RX ORDER — SODIUM CHLORIDE 0.9 % (FLUSH) 0.9 %
10 SYRINGE (ML) INJECTION EVERY 12 HOURS SCHEDULED
Status: DISCONTINUED | OUTPATIENT
Start: 2025-07-28 | End: 2025-07-29

## 2025-07-28 RX ADMIN — POTASSIUM CHLORIDE 40 MEQ: 1500 TABLET, EXTENDED RELEASE ORAL at 21:03

## 2025-07-28 RX ADMIN — Medication 10 ML: at 23:25

## 2025-07-28 RX ADMIN — SODIUM CHLORIDE 100 ML/HR: 9 INJECTION, SOLUTION INTRAVENOUS at 23:23

## 2025-07-28 RX ADMIN — MAGNESIUM SULFATE HEPTAHYDRATE 2 G: 40 INJECTION, SOLUTION INTRAVENOUS at 23:25

## 2025-07-28 RX ADMIN — Medication 5 MG: at 23:26

## 2025-07-28 RX ADMIN — SODIUM CHLORIDE 500 ML: 9 INJECTION, SOLUTION INTRAVENOUS at 21:14

## 2025-07-28 NOTE — ED TRIAGE NOTES
Pt from home via ems, called for pain to both legs from hips radiating down legs, family feels she not acting herself and is concerned for UTI    EMS reports O2 sat between 88-93, they attempted to place O2 but pt refused

## 2025-07-29 LAB
ALBUMIN SERPL-MCNC: 3.1 G/DL (ref 3.5–5.2)
ALBUMIN SERPL-MCNC: 3.3 G/DL (ref 3.5–5.2)
ALBUMIN SERPL-MCNC: 3.4 G/DL (ref 3.5–5.2)
ALBUMIN/GLOB SERPL: 1 G/DL
ALBUMIN/GLOB SERPL: 1.1 G/DL
ALP SERPL-CCNC: 110 U/L (ref 39–117)
ALP SERPL-CCNC: 116 U/L (ref 39–117)
ALT SERPL W P-5'-P-CCNC: 10 U/L (ref 1–33)
ALT SERPL W P-5'-P-CCNC: 8 U/L (ref 1–33)
ANION GAP SERPL CALCULATED.3IONS-SCNC: 12.1 MMOL/L (ref 5–15)
ANION GAP SERPL CALCULATED.3IONS-SCNC: 13.4 MMOL/L (ref 5–15)
ANION GAP SERPL CALCULATED.3IONS-SCNC: 14.8 MMOL/L (ref 5–15)
AST SERPL-CCNC: 14 U/L (ref 1–32)
AST SERPL-CCNC: 14 U/L (ref 1–32)
BILIRUB SERPL-MCNC: 0.3 MG/DL (ref 0–1.2)
BILIRUB SERPL-MCNC: 0.3 MG/DL (ref 0–1.2)
BUN SERPL-MCNC: 26 MG/DL (ref 8–23)
BUN SERPL-MCNC: 27 MG/DL (ref 8–23)
BUN SERPL-MCNC: 29 MG/DL (ref 8–23)
BUN/CREAT SERPL: 24.8 (ref 7–25)
BUN/CREAT SERPL: 24.8 (ref 7–25)
BUN/CREAT SERPL: 25.5 (ref 7–25)
CALCIUM SPEC-SCNC: 8.6 MG/DL (ref 8.2–9.6)
CALCIUM SPEC-SCNC: 8.7 MG/DL (ref 8.2–9.6)
CALCIUM SPEC-SCNC: 9.1 MG/DL (ref 8.2–9.6)
CHLORIDE SERPL-SCNC: 100 MMOL/L (ref 98–107)
CHLORIDE SERPL-SCNC: 102 MMOL/L (ref 98–107)
CHLORIDE SERPL-SCNC: 103 MMOL/L (ref 98–107)
CO2 SERPL-SCNC: 23.2 MMOL/L (ref 22–29)
CO2 SERPL-SCNC: 23.6 MMOL/L (ref 22–29)
CO2 SERPL-SCNC: 23.9 MMOL/L (ref 22–29)
CREAT SERPL-MCNC: 1.05 MG/DL (ref 0.57–1)
CREAT SERPL-MCNC: 1.06 MG/DL (ref 0.57–1)
CREAT SERPL-MCNC: 1.17 MG/DL (ref 0.57–1)
DEPRECATED RDW RBC AUTO: 44.7 FL (ref 37–54)
EGFRCR SERPLBLD CKD-EPI 2021: 44.4 ML/MIN/1.73
EGFRCR SERPLBLD CKD-EPI 2021: 50 ML/MIN/1.73
EGFRCR SERPLBLD CKD-EPI 2021: 50.6 ML/MIN/1.73
ERYTHROCYTE [DISTWIDTH] IN BLOOD BY AUTOMATED COUNT: 14.2 % (ref 12.3–15.4)
GLOBULIN UR ELPH-MCNC: 2.8 GM/DL
GLOBULIN UR ELPH-MCNC: 3.2 GM/DL
GLUCOSE SERPL-MCNC: 121 MG/DL (ref 65–99)
GLUCOSE SERPL-MCNC: 91 MG/DL (ref 65–99)
GLUCOSE SERPL-MCNC: 95 MG/DL (ref 65–99)
HCT VFR BLD AUTO: 29.1 % (ref 34–46.6)
HGB BLD-MCNC: 9.5 G/DL (ref 12–15.9)
MAGNESIUM SERPL-MCNC: 1.1 MG/DL (ref 1.6–2.4)
MAGNESIUM SERPL-MCNC: 1.6 MG/DL (ref 1.6–2.4)
MCH RBC QN AUTO: 28.2 PG (ref 26.6–33)
MCHC RBC AUTO-ENTMCNC: 32.6 G/DL (ref 31.5–35.7)
MCV RBC AUTO: 86.4 FL (ref 79–97)
PHOSPHATE SERPL-MCNC: 3.8 MG/DL (ref 2.5–4.5)
PHOSPHATE SERPL-MCNC: 3.8 MG/DL (ref 2.5–4.5)
PLATELET # BLD AUTO: 267 10*3/MM3 (ref 140–450)
PMV BLD AUTO: 9.7 FL (ref 6–12)
POTASSIUM SERPL-SCNC: 3.1 MMOL/L (ref 3.5–5.2)
POTASSIUM SERPL-SCNC: 3.3 MMOL/L (ref 3.5–5.2)
POTASSIUM SERPL-SCNC: 3.5 MMOL/L (ref 3.5–5.2)
POTASSIUM SERPL-SCNC: 3.5 MMOL/L (ref 3.5–5.2)
PROT SERPL-MCNC: 5.9 G/DL (ref 6–8.5)
PROT SERPL-MCNC: 6.5 G/DL (ref 6–8.5)
RBC # BLD AUTO: 3.37 10*6/MM3 (ref 3.77–5.28)
SODIUM SERPL-SCNC: 136 MMOL/L (ref 136–145)
SODIUM SERPL-SCNC: 140 MMOL/L (ref 136–145)
SODIUM SERPL-SCNC: 140 MMOL/L (ref 136–145)
WBC NRBC COR # BLD AUTO: 6.45 10*3/MM3 (ref 3.4–10.8)

## 2025-07-29 PROCEDURE — G0378 HOSPITAL OBSERVATION PER HR: HCPCS

## 2025-07-29 PROCEDURE — 97166 OT EVAL MOD COMPLEX 45 MIN: CPT

## 2025-07-29 PROCEDURE — 83735 ASSAY OF MAGNESIUM: CPT | Performed by: NURSE PRACTITIONER

## 2025-07-29 PROCEDURE — 94799 UNLISTED PULMONARY SVC/PX: CPT

## 2025-07-29 PROCEDURE — 96361 HYDRATE IV INFUSION ADD-ON: CPT

## 2025-07-29 PROCEDURE — 97530 THERAPEUTIC ACTIVITIES: CPT

## 2025-07-29 PROCEDURE — 36415 COLL VENOUS BLD VENIPUNCTURE: CPT | Performed by: NURSE PRACTITIONER

## 2025-07-29 PROCEDURE — 94761 N-INVAS EAR/PLS OXIMETRY MLT: CPT

## 2025-07-29 PROCEDURE — 84100 ASSAY OF PHOSPHORUS: CPT | Performed by: NURSE PRACTITIONER

## 2025-07-29 PROCEDURE — 80053 COMPREHEN METABOLIC PANEL: CPT | Performed by: NURSE PRACTITIONER

## 2025-07-29 PROCEDURE — 84132 ASSAY OF SERUM POTASSIUM: CPT | Performed by: HOSPITALIST

## 2025-07-29 PROCEDURE — 84100 ASSAY OF PHOSPHORUS: CPT | Performed by: HOSPITALIST

## 2025-07-29 PROCEDURE — 96366 THER/PROPH/DIAG IV INF ADDON: CPT

## 2025-07-29 RX ORDER — ATORVASTATIN CALCIUM 20 MG/1
10 TABLET, FILM COATED ORAL NIGHTLY
Status: DISCONTINUED | OUTPATIENT
Start: 2025-07-29 | End: 2025-08-02 | Stop reason: HOSPADM

## 2025-07-29 RX ORDER — POTASSIUM CHLORIDE 750 MG/1
10 TABLET, EXTENDED RELEASE ORAL 2 TIMES DAILY
COMMUNITY
End: 2025-08-02 | Stop reason: HOSPADM

## 2025-07-29 RX ORDER — CARBOXYMETHYLCELLULOSE SODIUM AND GLYCERIN 5; 10 MG/ML; MG/ML
1 SOLUTION/ DROPS OPHTHALMIC 2 TIMES DAILY
COMMUNITY

## 2025-07-29 RX ORDER — FUROSEMIDE 40 MG/1
40 TABLET ORAL DAILY
COMMUNITY
End: 2025-08-02 | Stop reason: HOSPADM

## 2025-07-29 RX ORDER — FAMOTIDINE 20 MG/1
20 TABLET, FILM COATED ORAL
Status: DISCONTINUED | OUTPATIENT
Start: 2025-07-29 | End: 2025-08-02 | Stop reason: HOSPADM

## 2025-07-29 RX ORDER — BISOPROLOL FUMARATE 5 MG/1
5 TABLET, FILM COATED ORAL DAILY
Status: DISCONTINUED | OUTPATIENT
Start: 2025-07-29 | End: 2025-08-02 | Stop reason: HOSPADM

## 2025-07-29 RX ORDER — CELECOXIB 200 MG/1
200 CAPSULE ORAL DAILY
Status: DISCONTINUED | OUTPATIENT
Start: 2025-07-29 | End: 2025-08-02 | Stop reason: HOSPADM

## 2025-07-29 RX ORDER — HYDROCODONE BITARTRATE AND ACETAMINOPHEN 5; 325 MG/1; MG/1
1 TABLET ORAL ONCE
Refills: 0 | Status: COMPLETED | OUTPATIENT
Start: 2025-07-29 | End: 2025-07-29

## 2025-07-29 RX ORDER — BUDESONIDE AND FORMOTEROL FUMARATE DIHYDRATE 160; 4.5 UG/1; UG/1
2 AEROSOL RESPIRATORY (INHALATION)
Status: DISCONTINUED | OUTPATIENT
Start: 2025-07-29 | End: 2025-08-02 | Stop reason: HOSPADM

## 2025-07-29 RX ORDER — AMLODIPINE BESYLATE 2.5 MG/1
2.5 TABLET ORAL DAILY
Status: DISCONTINUED | OUTPATIENT
Start: 2025-07-29 | End: 2025-08-02 | Stop reason: HOSPADM

## 2025-07-29 RX ORDER — HYDROCODONE BITARTRATE AND ACETAMINOPHEN 5; 325 MG/1; MG/1
1 TABLET ORAL EVERY 8 HOURS PRN
Refills: 0 | Status: COMPLETED | OUTPATIENT
Start: 2025-07-29 | End: 2025-07-31

## 2025-07-29 RX ORDER — CELECOXIB 200 MG/1
200 CAPSULE ORAL DAILY
Status: ON HOLD | COMMUNITY
End: 2025-08-02

## 2025-07-29 RX ORDER — POTASSIUM CHLORIDE 1.5 G/1.58G
40 POWDER, FOR SOLUTION ORAL EVERY 4 HOURS
Status: DISPENSED | OUTPATIENT
Start: 2025-07-29 | End: 2025-07-29

## 2025-07-29 RX ORDER — SERTRALINE HYDROCHLORIDE 100 MG/1
100 TABLET, FILM COATED ORAL DAILY
Status: DISCONTINUED | OUTPATIENT
Start: 2025-07-29 | End: 2025-08-02 | Stop reason: HOSPADM

## 2025-07-29 RX ORDER — HYDROCHLOROTHIAZIDE 12.5 MG/1
12.5 TABLET ORAL DAILY
COMMUNITY
End: 2025-08-02 | Stop reason: HOSPADM

## 2025-07-29 RX ORDER — IRBESARTAN 300 MG/1
300 TABLET ORAL DAILY
COMMUNITY
End: 2025-08-02 | Stop reason: HOSPADM

## 2025-07-29 RX ORDER — LEVOTHYROXINE SODIUM 125 UG/1
125 TABLET ORAL
Status: DISCONTINUED | OUTPATIENT
Start: 2025-07-30 | End: 2025-08-02 | Stop reason: HOSPADM

## 2025-07-29 RX ORDER — AMLODIPINE BESYLATE 2.5 MG/1
2.5 TABLET ORAL DAILY
COMMUNITY

## 2025-07-29 RX ADMIN — BUDESONIDE AND FORMOTEROL FUMARATE DIHYDRATE 2 PUFF: 160; 4.5 AEROSOL RESPIRATORY (INHALATION) at 12:18

## 2025-07-29 RX ADMIN — HYDROCODONE BITARTRATE AND ACETAMINOPHEN 1 TABLET: 5; 325 TABLET ORAL at 00:40

## 2025-07-29 RX ADMIN — FAMOTIDINE 20 MG: 20 TABLET, FILM COATED ORAL at 17:08

## 2025-07-29 RX ADMIN — HYDROCODONE BITARTRATE AND ACETAMINOPHEN 1 TABLET: 5; 325 TABLET ORAL at 11:59

## 2025-07-29 RX ADMIN — CELECOXIB 200 MG: 200 CAPSULE ORAL at 11:59

## 2025-07-29 RX ADMIN — SERTRALINE HYDROCHLORIDE 100 MG: 100 TABLET, FILM COATED ORAL at 11:59

## 2025-07-29 RX ADMIN — POTASSIUM CHLORIDE 40 MEQ: 1.5 POWDER, FOR SOLUTION ORAL at 11:59

## 2025-07-29 RX ADMIN — AMLODIPINE BESYLATE 2.5 MG: 2.5 TABLET ORAL at 11:59

## 2025-07-29 RX ADMIN — POTASSIUM CHLORIDE 40 MEQ: 1.5 POWDER, FOR SOLUTION ORAL at 17:08

## 2025-07-29 NOTE — DISCHARGE PLACEMENT REQUEST
"Bailey Foster (89 y.o. Female)       Date of Birth   1935    Social Security Number       Address   [address] [id number]    Home Phone       MRN   [id number]       [employer]   [employer]ity    Marital Status                               Admission Date   7/28/2025    Admission Type   Emergency    Admitting Provider   Teddy Matthews MD    Attending Provider   Teddy Matthews MD    Department, Room/Bed   [address], P790/1       Discharge Date       Discharge Disposition       Discharge Destination                                 Attending Provider: Teddy Matthews MD    Allergies: Cefaclor, Hydrochlorothiazide, Penicillins, Sulfa Antibiotics, Nickel    Isolation: None   Infection: None   Code Status: CPR    Ht: 157.5 cm (62\")   Wt: 65.8 kg (145 lb)    Admission Cmt: None   Principal Problem: Acute renal failure [N17.9]                   Active Insurance as of 7/28/2025       Primary Coverage       Payor Plan Insurance Group Employer/Plan Group    HUMANA MEDICARE REPLACEMENT HUMANA MEDICARE ADVANTAGE PPO [id number]       Payor Plan Address Payor Plan Phone Number Payor Plan Fax Number Effective Dates    [address] [address] [phone]  3/1/2025 - None Entered    [facility] [address]         Subscriber Name Subscriber Birth Date Member ID       BAILEY FOSTER 1935 [id number]                     Emergency Contacts        (Rel.) Home Phone Work Phone Mobile Phone    Paty Jones (Daughter) -- -- [phone]    MARICARMEN JONES (Grandchild) [phone] -- --          "

## 2025-07-29 NOTE — PROGRESS NOTES
Clinical Pharmacy Services: Medication History    Candice Walker is a 89 y.o. female presenting to [facility] for Hypokalemia [E87.6]  Hypomagnesemia [E83.42]  Hyponatremia [E87.1]  Acute renal failure [N17.9]  Acute renal failure, unspecified acute renal failure type [N17.9]    She  has a past medical history of Anemia, Anxiety, Arthritis, Cancer (2006), Chronic bronchitis, Depression, Disease of thyroid gland, GERD (gastroesophageal reflux disease), Hard of hearing, Heart murmur, History of kidney stones, History of UTI (02/2017), Hyperlipidemia, Hypertension, MRSA (methicillin resistant Staphylococcus aureus) infection (2000s), RBBB, Risk factors for obstructive sleep apnea, and Shoulder pain.    Allergies as of 07/28/2025 - Reviewed 07/28/2025   Allergen Reaction Noted    Cefaclor Other (See Comments) 01/28/2012    Hydrochlorothiazide Other (See Comments) 06/23/2025    Penicillins Other (See Comments) 01/28/2012    Sulfa antibiotics Other (See Comments) 01/28/2012    Nickel Itching 11/04/2016       Medication information was obtained from: Patient's daughter    Prior to Admission Medications       Prescriptions Last Dose Informant Patient Reported? Taking?    albuterol (PROVENTIL HFA;VENTOLIN HFA) 108 (90 BASE) MCG/ACT inhaler Past Month Medication Bottle, Child Yes Yes    Inhale 2 puffs Every 4 (Four) Hours As Needed for Wheezing.    amLODIPine (NORVASC) 2.5 MG tablet  Medication Bottle, Child Yes Yes    Take 1 tablet by mouth Daily.    bisoprolol (ZEBeta) 5 MG tablet 7/28/2025 Child, Medication Bottle Yes Yes    Take 1 tablet by mouth Daily.    Carboxymethylcell-Glycerin PF (Refresh Relieva PF) 0.5-1 % solution  Medication Bottle, Child Yes Yes    Administer 1 drop to both eyes 2 (Two) Times a Day.    celecoxib (CeleBREX) 200 MG capsule  Medication Bottle, Child Yes Yes    Take 1 capsule by mouth Daily.    Ferrous Sulfate (IRON) 325 (65 FE) MG tablet 7/28/2025 Child, Medication Bottle Yes Yes     Take 1 tablet by mouth Daily.    Fluticasone Furoate-Vilanterol (Breo Ellipta) 200-25 MCG/ACT inhaler Past Week Child, Medication Bottle Yes Yes    Inhale 1 puff Every Night.    furosemide (LASIX) 40 MG tablet  Child, Medication Bottle Yes Yes    Take 1 tablet by mouth Daily.    hydroCHLOROthiazide 12.5 MG tablet  Medication Bottle, Child Yes Yes    Take 1 tablet by mouth Daily.    irbesartan (AVAPRO) 300 MG tablet  Medication Bottle, Child Yes Yes    Take 1 tablet by mouth Daily.    levothyroxine (SYNTHROID, LEVOTHROID) 125 MCG tablet 7/28/2025 Child, Medication Bottle Yes Yes    Take 1 tablet by mouth Daily.    omeprazole (priLOSEC) 20 MG capsule 7/28/2025 Medication Bottle, Child Yes Yes    Take 1 capsule by mouth Daily.    potassium chloride 10 MEQ CR tablet  Medication Bottle, Child Yes Yes    Take 1 tablet by mouth 2 (Two) Times a Day.    sertraline (ZOLOFT) 100 MG tablet 7/28/2025 Child, Medication Bottle Yes Yes    Take 1 tablet by mouth Daily.    simvastatin (ZOCOR) 20 MG tablet Past Week Medication Bottle, Child Yes Yes    Take 1 tablet by mouth Every Night.              Medication notes: I spoke to the patients daughter and she was able to read her medication bottles to confirm PTA medications. All updates have been made and changes are listed below.     Amlodipine/Valsartan 5-160 mg PO daily - REMOVED (therapy changed)  Amlodipine 2.5 mg PO daily - ADDED  Celecoxib 200 mg PO daily - ADDED  Calcium Carbonate 600 mg PO daily - REMOVED (no longer taking)  Folic Acid 1 mg PO daily - REMOVED (no longer taking)  Furosemide 40 mg PO daily - ADDED  Hydrochlorothiazide 12.5 mg tablet PO daily - ADDED  Irbesartan 300 mg PO daily - ADDED  KCl ER 10 mEq PO BID - ADDED  Multivitamin 1 tablet PO daily - REMOVED (no longer taking)  Refresh Relieva PF eyedrops 1 drop OU BID - ADDED      This medication list is complete to the best of my knowledge as of 7/29/2025    Please call if questions.    Marshal Queen,  PharmD  7/29/2025 10:22 EDT

## 2025-07-29 NOTE — CASE MANAGEMENT/SOCIAL WORK
Discharge Planning Assessment  [facility]     Patient Name: Candice Walker  MRN: [id number]  Today's Date: 7/29/2025    Admit Date: 7/28/2025    Plan: Home with [facility] pending PT eval   Discharge Needs Assessment       Row Name 07/29/25 0855       Living Environment    People in Home child(greg), adult;grandchild(greg)    Current Living Arrangements home    Potentially Unsafe Housing Conditions none    Primary Care Provided by self;child(greg)    Provides Primary Care For no one    Family Caregiver if Needed child(greg), adult    Quality of Family Relationships helpful;involved;supportive       Transition Planning    Patient/Family Anticipates Transition to home with family;inpatient rehabilitation facility;home with help/services    Patient/Family Anticipated Services at Transition home health care;skilled nursing       Discharge Needs Assessment    Readmission Within the Last 30 Days no previous admission in last 30 days    Current Outpatient/Agency/Support Group homecare agency;skilled nursing facility    Equipment Currently Used at Home rollator;grab bar;shower chair;commode    Concerns to be Addressed no discharge needs identified;denies needs/concerns at this time    Equipment Needed After Discharge none    Outpatient/Agency/Support Group Needs homecare agency;skilled nursing facility    Discharge Facility/Level of Care Needs home with home health;nursing facility, skilled                   Discharge Plan       Row Name 07/29/25 0855       Plan    Plan Home with [facility] pending PT eval    Plan Comments CCP met with patient at bedside. CCP role explained and discharge planning discussed. Face sheet verified and SHIPMAN noted. Patient's PCP is Heena Phan. Patient lives with her daughter, son in law and granddaughter. Patient's bedroom and bathroom are on the main level. Patient has grab bars and shower chair in the bathroom. Patient uses a rollator for mobility. [facility]. Patient has home health but could not recall which agency. Patient gave CCP permission to discuss discharge plans with patient's daughter, Paty. CCP spoke with Paty. She states she is unsure of d/c needs at this time. Patient is current with [facility] . PT eval ordered. CCP will follow for PT eval and assist as needed. Luci LAFLEUR                  Continued Care and Services - Admitted Since 7/28/2025       Home Medical Care       Service Provider Request Status Services Address Phone Fax Patient Preferred    [facility] Pending - Request Sent -- [address] [place] [address] [phone] --                  Selected Continued Care - Prior Encounters Includes continued care and service providers with selected services from prior encounters from 4/29/2025 to 7/29/2025      Discharged on 6/25/2025 Admission date: 6/17/2025 - Discharge disposition: Skilled Nursing Facility (DC - External)      Destination       Service Provider Services Address Phone Fax Patient Preferred    [facility] [address] [facility] [address] [phone] --                             Demographic Summary       Row Name 07/29/25 0854       General Information    Admission Type observation    Arrived From emergency department    Required Notices Provided Observation Status Notice    Referral Source admission list    Reason for Consult discharge planning    Preferred Language English                   Functional Status       Row Name 07/29/25 0854       Functional Status    Usual Activity Tolerance good    Current Activity Tolerance moderate       Functional Status, IADL    Medications assistive equipment    Meal Preparation assistive equipment    Housekeeping assistive equipment    Laundry assistive equipment    Shopping assistive equipment       Mental Status Summary    Recent Changes in Mental Status/Cognitive Functioning no changes                    Psychosocial    No documentation.                  Abuse/Neglect    No documentation.                  Legal    No documentation.                  Substance Abuse    No documentation.                  Patient Forms    No documentation.                     CIARRA Reynolds

## 2025-07-29 NOTE — ED NOTES
Nursing report ED to floor  Candice Walker  89 y.o.  female    HPI :  HPI  Stated Reason for Visit: weakness  History Obtained From: EMS    Chief Complaint  Chief Complaint   Patient presents with    Weakness - Generalized       Admitting doctor:   John Mobley MD    Admitting diagnosis:   The primary encounter diagnosis was Acute renal failure, unspecified acute renal failure type. Diagnoses of Hyponatremia, Hypomagnesemia, and Hypokalemia were also pertinent to this visit.    Code status:   Current Code Status       Date Active Code Status Order ID Comments User Context       7/28/2025 2235 CPR (Attempt to Resuscitate) [id number]  Jeny Stern, [facility]        Question Answer    Code Status (Patient has no pulse and is not breathing) CPR (Attempt to Resuscitate)    Medical Interventions (Patient has pulse or is breathing) Full Support                    Allergies:   Cefaclor, Hydrochlorothiazide, Penicillins, Sulfa antibiotics, and Nickel    Isolation:   No active isolations    Intake and Output    Intake/Output Summary (Last 24 hours) at 7/28/2025 2349  Last data filed at 7/28/2025 2247  Gross per 24 hour   Intake 500 ml   Output --   Net 500 ml       Weight:       07/28/25  1807   Weight: 65.8 kg (145 lb)       Most recent vitals:   Vitals:    07/28/25 2021 07/28/25 2103 07/28/25 2105 07/28/25 2200   BP:  103/89  180/89   Pulse: 59 57  59   Resp:       Temp:       SpO2: 100% 93% 95% 96%   Weight:       Height:           Active LDAs/IV Access:   Lines, Drains & Airways       Active LDAs       Name Placement date Placement time Site Days    Peripheral IV 07/28/25 2100 20 G Anterior;Left Forearm 07/28/25 2100  Forearm  less than 1    External Urinary Catheter 07/28/25 2015  --  less than 1                    Labs (abnormal labs have a star):   Labs Reviewed   COMPREHENSIVE METABOLIC PANEL - Abnormal; Notable for the following components:       Result Value    BUN 32.0 (*)     Creatinine 1.47 (*)     Sodium  135 (*)     Potassium 3.0 (*)     Chloride 97 (*)     CO2 20.4 (*)     Albumin 3.3 (*)     Alkaline Phosphatase 128 (*)     Anion Gap 17.6 (*)     eGFR 33.8 (*)     All other components within normal limits    Narrative:     GFR Categories in Chronic Kidney Disease (CKD)              GFR Category          GFR (mL/min/1.73)    Interpretation  G1                    90 or greater        Normal or high (1)  G2                    60-89                Mild decrease (1)  G3a                   45-59                Mild to moderate decrease  G3b                   30-44                Moderate to severe decrease  G4                    15-29                Severe decrease  G5                    14 or less           Kidney failure    (1)In the absence of evidence of kidney disease, neither GFR category G1 or G2 fulfill the criteria for CKD.    eGFR calculation 2021 CKD-EPI creatinine equation, which does not include race as a factor   CBC WITH AUTO DIFFERENTIAL - Abnormal; Notable for the following components:    RBC 3.49 (*)     Hemoglobin 9.3 (*)     Hematocrit 30.7 (*)     MCHC 30.3 (*)     Lymphocyte % 12.9 (*)     All other components within normal limits   URINALYSIS W/ CULTURE IF INDICATED - Abnormal; Notable for the following components:    Leuk Esterase, UA Trace (*)     All other components within normal limits    Narrative:     In absence of clinical symptoms, the presence of pyuria, bacteria, and/or nitrites on the urinalysis result does not correlate with infection.   MAGNESIUM - Abnormal; Notable for the following components:    Magnesium 1.1 (*)     All other components within normal limits   URINALYSIS, MICROSCOPIC ONLY - Abnormal; Notable for the following components:    Bacteria, UA 4+ (*)     All other components within normal limits   OSMOLALITY, SERUM - Normal   RAINBOW DRAW    Narrative:     The following orders were created for panel order [place] Draw.  Procedure                               Abnormality          Status                     ---------                               -----------         ------                     Green Top (Gel)[[id number]]                                  Final result               Lavender Top[[id number]]                                     Final result               Gold Top - SST[[id number]]                                   Final result               Light Blue Top[[id number]]                                   Final result                 Please view results for these tests on the individual orders.   SODIUM, URINE, RANDOM    Narrative:     Reference intervals for random urine have not been established.  Clinical usage is dependent upon physician's interpretation in combination with other laboratory tests.      OSMOLALITY, URINE    Narrative:     Osmo Normal Reference Ranges:    Random:  mOsm/kg H2O, depending on fluid intake.  Random: >850 mOsm/kg H20, after 12 hour fluid restriction.    24 Hour: 300-900 mOsm/kg H2O.   CBC (NO DIFF)   MAGNESIUM   MAGNESIUM   COMPREHENSIVE METABOLIC PANEL   COMPREHENSIVE METABOLIC PANEL   CBC AND DIFFERENTIAL    Narrative:     The following orders were created for panel order CBC & Differential.  Procedure                               Abnormality         Status                     ---------                               -----------         ------                     CBC Auto Differential[[id number]]        Abnormal            Final result                 Please view results for these tests on the individual orders.   GREEN TOP   LAVENDER TOP   GOLD TOP - SST   LIGHT BLUE TOP       EKG:   No orders to display       Meds given in ED:   Medications   nitroglycerin (NITROSTAT) SL tablet 0.4 mg (has no administration in time range)   sodium chloride 0.9 % flush 10 mL (10 mL Intravenous Given 7/28/25 0157)   sodium chloride 0.9 % flush 10 mL (has no administration in time range)   sodium chloride 0.9 % infusion 40 mL (has no administration in time  range)   acetaminophen (TYLENOL) tablet 650 mg (has no administration in time range)     Or   acetaminophen (TYLENOL) 160 MG/5ML oral solution 650 mg (has no administration in time range)     Or   acetaminophen (TYLENOL) suppository 650 mg (has no administration in time range)   famotidine (PEPCID) tablet 20 mg (has no administration in time range)   sennosides-docusate (PERICOLACE) 8.6-50 MG per tablet 2 tablet (has no administration in time range)     And   polyethylene glycol (MIRALAX) packet 17 g (has no administration in time range)     And   bisacodyl (DULCOLAX) EC tablet 5 mg (has no administration in time range)     And   bisacodyl (DULCOLAX) suppository 10 mg (has no administration in time range)   ondansetron (ZOFRAN) injection 4 mg (has no administration in time range)   melatonin tablet 5 mg (5 mg Oral Given 256)   sodium chloride 0.9 % infusion (100 mL/hr Intravenous New Bag 25 2323)   sodium chloride 0.9 % bolus 500 mL (0 mL Intravenous Stopped 257)   potassium chloride (KLOR-CON M20) CR tablet 40 mEq (40 mEq Oral Given 253)   magnesium sulfate 2g/50 mL (PREMIX) infusion (2 g Intravenous New Bag 255)       Imaging results:  No radiology results for the last day    Ambulatory status:   - assist    Social issues:   Social History     Socioeconomic History    Marital status:    Tobacco Use    Smoking status: Former     Current packs/day: 0.00     Average packs/day: 0.5 packs/day for 35.0 years (17.5 ttl pk-yrs)     Types: Cigarettes     Start date: 1960     Quit date: 1995     Years since quittin.4   Vaping Use    Vaping status: Never Used   Substance and Sexual Activity    Alcohol use: Yes     Alcohol/week: 14.0 standard drinks of alcohol     Types: 14 Shots of liquor per week     Comment: 4-5 X per week , 2-4 shots    Drug use: No    Sexual activity: Defer       Peripheral Neurovascular  Peripheral Neurovascular (Adult)  Peripheral  Neurovascular WDL: WDL    Neuro Cognitive  Neuro Cognitive (Adult)  Cognitive/Neuro/Behavioral WDL: WDL  Pupils  Pupil PERRLA: yes    Learning  Learning Assessment  Learning Readiness and Ability: no barriers identified    Respiratory  Respiratory WDL  Respiratory WDL: WDL    Abdominal Pain       Pain Assessments  Pain (Adult)  Preferred Pain Scale: FACES (Goff-Baker FACES Pain Rating Scale)  FACES Pain Rating: Rest: 6-->hurts even more    NIH Stroke Scale       Nicole Dumont RN  07/28/25 23:49 EDT

## 2025-07-29 NOTE — THERAPY EVALUATION
Patient Name: Candice Walker  : 1935    MRN: [id number]                              Today's Date: 2025       Admit Date: 2025    Visit Dx:     ICD-10-CM ICD-9-CM   1. Acute renal failure, unspecified acute renal failure type  N17.9 584.9   2. Hyponatremia  E87.1 276.1   3. Hypomagnesemia  E83.42 275.2   4. Hypokalemia  E87.6 276.8     Patient Active Problem List   Diagnosis    S/p reverse total shoulder arthroplasty    Acute UTI (urinary tract infection)    Hyponatremia    PHUONG (acute kidney injury)    Hypomagnesemia    Hypertension    Anemia    COPD (chronic obstructive pulmonary disease)    Alcohol use    DDD (degenerative disc disease), cervical    Acute renal failure    Arthritis     Past Medical History:   Diagnosis Date    Anemia     Anxiety     Arthritis     Cancer 2006    BREAST CANCER, RIGHT MASTECTOMY    Chronic bronchitis     Depression     Disease of thyroid gland     GERD (gastroesophageal reflux disease)     Hard of hearing     Heart murmur     History of kidney stones     History of UTI 2017    HAD ABX    Hyperlipidemia     Hypertension     MRSA (methicillin resistant Staphylococcus aureus) infection s    EARLY , HERE AT [facility] LEFT KNEE  , D/W GISELLE IN INFECTION CONTROL    RBBB     Risk factors for obstructive sleep apnea     Shoulder pain     LEFT     Past Surgical History:   Procedure Laterality Date    APPENDECTOMY      CATARACT EXTRACTION      WITH LENS IMPLANT    CHOLECYSTECTOMY      CYST REMOVAL      ON SPINE    EYE SURGERY      HYSTERECTOMY      JOINT REPLACEMENT      LT KNEE    MASTECTOMY Right 2006    MASTECTOMY RADICAL WITH AXILLARY NODE DISSECTION Right     THYROIDECTOMY      TOTAL SHOULDER ARTHROPLASTY W/ DISTAL CLAVICLE EXCISION Left 2017    Procedure: LT TOTAL SHOULDER REVERSE ARTHROPLASTY;  Surgeon: Ezequiel Elizalde MD;  Location: [facility] [place] [facility] [place];  Service:       General Information       Row Name 25 1603          OT Time and Intention    Document  Type evaluation  -KR     Mode of Treatment individual therapy;occupational therapy  -KR     Patient Effort good  -KR       Row Name 07/29/25 1603          General Information    Patient Profile Reviewed yes  -KR     Prior Level of Function --  reports assist as needed with ADLs, family helps her walk to and from the bathroom using her rollator  -KR     Existing Precautions/Restrictions fall  -KR       Row Name 07/29/25 1603          Living Environment    Current Living Arrangements home  -KR     People in Home child(greg), adult;grandchild(greg)  -KR       Row Name 07/29/25 1603          Cognition    Orientation Status (Cognition) oriented x 3  -KR       Row Name 07/29/25 1603          Safety Issues/Impairments Affecting Functional Mobility    Impairments Affecting Function (Mobility) balance;endurance/activity tolerance;strength;pain;range of motion (ROM)  -KR               User Key  (r) = Recorded By, (t) = Taken By, (c) = Cosigned By      Initials Name Provider Type    KR Maylin Abbott OT Occupational Therapist                     Mobility/ADL's       Row Name 07/29/25 1603          Bed Mobility    Bed Mobility supine-sit;sit-supine  -KR     Supine-Sit [place] (Bed Mobility) standby assist  -KR     Sit-Supine [place] (Bed Mobility) standby assist  -KR       Row Name 07/29/25 1603          Transfers    Transfers sit-stand transfer  -KR       Row Name 07/29/25 1603          Sit-Stand Transfer    Sit-Stand [place] (Transfers) contact guard;1 person assist  -KR     Assistive Device (Sit-Stand Transfers) walker, 4-wheeled  -KR       Row Name 07/29/25 1603          Functional Mobility    Functional Mobility- Ind. Level 1 person;minimum assist (75% patient effort)  -KR     Functional Mobility- Comment short distance in room due to pain and fatigue in her R knee  -KR       Row Name 07/29/25 1603          Activities of Daily Living    BADL Assessment/Intervention --  declined all ADLs. anticipate needing min  A for toileting needs, max A for LBD and min A for UBD  -KR               User Key  (r) = Recorded By, (t) = Taken By, (c) = Cosigned By      Initials Name Provider Type    Maylin Alfonso OT Occupational Therapist                   Obj/Interventions       Row Name 07/29/25 1604          Range of Motion Comprehensive    General Range of Motion upper extremity range of motion deficits identified  -KR     Comment, General Range of Motion Rue decreased shoulder ROM (pt reports she needs a shoulder replacement)  -KR       Row Name 07/29/25 1604          Strength Comprehensive (MMT)    General Manual Muscle Testing (MMT) Assessment upper extremity strength deficits identified  -KR     Comment, General Manual Muscle Testing (MMT) Assessment RUe worse than LUE  -KR       Row Name 07/29/25 1604          Balance    Balance Assessment sitting static balance;sitting dynamic balance;standing dynamic balance;standing static balance  -KR     Static Sitting Balance supervision  -KR     Dynamic Sitting Balance supervision  -KR     Static Standing Balance contact guard  -KR     Dynamic Standing Balance minimal assist  -KR     Position/Device Used, Standing Balance walker, front-wheeled  -KR     Balance Interventions standing;sitting  -KR               User Key  (r) = Recorded By, (t) = Taken By, (c) = Cosigned By      Initials Name Provider Type    Maylin Alfonso OT Occupational Therapist                   Goals/Plan       Row Name 07/29/25 1607          Transfer Goal 1 (OT)    Activity/Assistive Device (Transfer Goal 1, OT) transfers, all  -KR     [place] Level/Cues Needed (Transfer Goal 1, OT) contact guard required  -KR     Time Frame (Transfer Goal 1, OT) short term goal (STG);2 weeks  -KR     Progress/Outcome (Transfer Goal 1, OT) goal ongoing  -KR       [facility] Name 07/29/25 1607          Bathing Goal 1 (OT)    Activity/Device (Bathing Goal 1, OT) bathing skills, all  -KR     [place] Level/Cues Needed (Bathing Goal  1, OT) minimum assist (75% or more patient effort)  -KR     Time Frame (Bathing Goal 1, OT) short term goal (STG);2 weeks  -KR     Progress/Outcomes (Bathing Goal 1, OT) goal ongoing  -KR       Row Name 07/29/25 1607          Dressing Goal 1 (OT)    Activity/Device (Dressing Goal 1, OT) dressing skills, all  -KR     [place]/Cues Needed (Dressing Goal 1, OT) moderate assist (50-74% patient effort)  -KR     Time Frame (Dressing Goal 1, OT) short term goal (STG);2 weeks  -KR     Progress/Outcome (Dressing Goal 1, OT) goal ongoing  -KR       Row Name 07/29/25 1607          Therapy Assessment/Plan (OT)    Planned Therapy Interventions (OT) activity tolerance training;BADL retraining;patient/caregiver education/training;ROM/therapeutic exercise;strengthening exercise;occupation/activity based interventions;functional balance retraining  -KR               User Key  (r) = Recorded By, (t) = Taken By, (c) = Cosigned By      Initials Name Provider Type    KR Maylin Abbott, ATUL Occupational Therapist                   Clinical Impression       Row Name 07/29/25 1605          Pain Assessment    Pre/Posttreatment Pain Comment unrated pain in R knee  -KR       Row Name 07/29/25 1605          Plan of Care Review    Plan of Care Reviewed With patient  -KR     Outcome Evaluation Pt supine in bed upon arrival. Pt admitted with pain to BLEs and family reported she was not acting like herself found to have PHUONG. Pt reports hx of arthritis in her joints. States she needs R shoulder and knee replacement. Reports she lives with her daugher and BORIS. They assist her walking to and from the bathroom using her rollator. She states they assist her as needed with ADLs as well. Today required SBA for supine to sit with increased time. Stood with CGA and mobilized short distance within the room using rwx and min A. Pt reported increased pain in her R knee and stated her knee was fatigued from walking to the bathroom earlier. Had to return to  bed due to pain and fatigue. Pt continues to benefit from skilled OT. Pt is adament she will be going home at discharge.  -KR       Row Name 07/29/25 1605          Therapy Assessment/Plan (OT)    Criteria for Skilled Therapeutic Interventions Met (OT) yes  -KR     Therapy Frequency (OT) 5 times/wk  -KR       Row Name 07/29/25 1605          Therapy Plan Review/Discharge Plan (OT)    Anticipated Discharge Disposition (OT) home with home health;home with assist;skilled nursing facility  pt adament she will be going home at dc  -KR       Row Name 07/29/25 1605          Vital Signs    Pre Patient Position Supine  -KR     Intra Patient Position Standing  -KR     Post Patient Position Supine  -KR       Row Name 07/29/25 1605          Positioning and Restraints    Pre-Treatment Position in bed  -KR     Post Treatment Position bed  -KR     In Bed notified nsg;supine;call light within reach;encouraged to call for assist;exit alarm on  -KR               User Key  (r) = Recorded By, (t) = Taken By, (c) = Cosigned By      Initials Name Provider Type    Maylin Alfonso, OT Occupational Therapist                   Outcome Measures       Row Name 07/29/25 0800          How much help from another person do you currently need...    Turning from your back to your side while in flat bed without using bedrails? 3  -AW     Moving from lying on back to sitting on the side of a flat bed without bedrails? 3  -AW     Moving to and from a bed to a chair (including a wheelchair)? 3  -AW     Standing up from a chair using your arms (e.g., wheelchair, bedside chair)? 3  -AW     Climbing 3-5 steps with a railing? 1  -AW     To walk in hospital room? 3  -AW     AM-PAC 6 Clicks Score (PT) 16  -AW               User Key  (r) = Recorded By, (t) = Taken By, (c) = Cosigned By      Initials Name Provider Type    Radha Albarado, RN Registered Nurse                      OT Recommendation and Plan  Planned Therapy Interventions (OT): activity tolerance  training, BADL retraining, patient/caregiver education/training, ROM/therapeutic exercise, strengthening exercise, occupation/activity based interventions, functional balance retraining  Therapy Frequency (OT): 5 times/wk  Plan of Care Review  Plan of Care Reviewed With: patient  Outcome Evaluation: Pt supine in bed upon arrival. Pt admitted with pain to BLEs and family reported she was not acting like herself found to have PHUONG. Pt reports hx of arthritis in her joints. States she needs R shoulder and knee replacement. Reports she lives with her daugher and BORIS. They assist her walking to and from the bathroom using her rollator. She states they assist her as needed with ADLs as well. Today required SBA for supine to sit with increased time. Stood with CGA and mobilized short distance within the room using rwx and min A. Pt reported increased pain in her R knee and stated her knee was fatigued from walking to the bathroom earlier. Had to return to bed due to pain and fatigue. Pt continues to benefit from skilled OT. Pt is adament she will be going home at discharge.     Time Calculation:         Time Calculation- OT       Row Name 07/29/25 1607             Time Calculation- OT    OT Start Time 1345  -KR      OT Stop Time 1420  -KR      OT Time Calculation (min) 35 min  -KR      OT Received On 07/29/25  -KR      OT - Next Appointment 07/30/25  -KR      OT Goal Re-Cert Due Date 08/12/25  -KR         Timed Charges    11846 - OT Therapeutic Activity Minutes 15  -KR         Total Minutes    Timed Charges Total Minutes 15  -KR       Total Minutes 15  -KR                User Key  (r) = Recorded By, (t) = Taken By, (c) = Cosigned By      Initials Name Provider Type    KR Maylin Abbott OT Occupational Therapist                  Therapy Charges for Today       Code Description Service Date Service Provider Modifiers Qty    [id number]  OT THERAPEUTIC ACT EA 15 MIN 7/29/2025 Maylin Abbott OT GO 1    [id number]  OT EVAL  MOD COMPLEXITY 3 7/29/2025 Maylin Abbott, OT GO 1                 Maylin Abbott OT  7/29/2025

## 2025-07-29 NOTE — ED NOTES
Pt presents to ED via EMS from home where she lives with her family with c/o pain in bilateral hips radiating into her legs. Pt states concern for a UTI. Pt is AxOx4.

## 2025-07-29 NOTE — H&P
"    Patient Name:  Candice Walker  YOB: 1935  MRN:  [id number]  Admit Date:  7/28/2025  Patient Care Team:  Heena Phan APRN as PCP - General (Nurse Practitioner)      Subjective   History Present Illness     Chief Complaint   Patient presents with    Weakness - Generalized       Ms. Walker is a 89-year-old female with a history of COPD, HTN and chronic hyponatremia presented with bilateral lower extremity tingling and numbness progressing to the arms. She also appeared more confused per family.  She was admitted to this facility last month and discharged with several changes in her medications.  She went to rehab and has been home for a little while.  She saw her primary care doctor a couple weeks ago who put her back on Celebrex and it seems Lasix though patient not sure if she takes it.  She complains of generalized arthritic pain she says is better when she takes Celebrex.  She denies any nausea or vomiting.  She also has prescribed omeprazole for \"stomach issues.\"  Remote history of gastric ulcers.      Review of Systems     Personal History     Past Medical History:   Diagnosis Date    Anemia     Anxiety     Arthritis     Cancer 2006    BREAST CANCER, RIGHT MASTECTOMY    Chronic bronchitis     Depression     Disease of thyroid gland     GERD (gastroesophageal reflux disease)     Hard of hearing     Heart murmur     History of kidney stones     History of UTI 02/2017    HAD ABX    Hyperlipidemia     Hypertension     MRSA (methicillin resistant Staphylococcus aureus) infection 2000s    EARLY 2000's, HERE AT [facility], LEFT KNEE  , D/W GISELLE IN INFECTION CONTROL    RBBB     Risk factors for obstructive sleep apnea     Shoulder pain     LEFT     Past Surgical History:   Procedure Laterality Date    APPENDECTOMY      CATARACT EXTRACTION      WITH LENS IMPLANT    CHOLECYSTECTOMY      CYST REMOVAL      ON SPINE    EYE SURGERY      HYSTERECTOMY      JOINT REPLACEMENT      LT KNEE    MASTECTOMY Right " 2006    MASTECTOMY RADICAL WITH AXILLARY NODE DISSECTION Right     THYROIDECTOMY      TOTAL SHOULDER ARTHROPLASTY W/ DISTAL CLAVICLE EXCISION Left 2017    Procedure: LT TOTAL SHOULDER REVERSE ARTHROPLASTY;  Surgeon: Ezequiel Elizalde MD;  Location: [facility] [place];  Service:      History reviewed. No pertinent family history.  Social History     Tobacco Use    Smoking status: Former     Current packs/day: 0.00     Average packs/day: 0.5 packs/day for 35.0 years (17.5 ttl pk-yrs)     Types: Cigarettes     Start date: 1960     Quit date: 1995     Years since quittin.4   Vaping Use    Vaping status: Never Used   Substance Use Topics    Alcohol use: Yes     Alcohol/week: 14.0 standard drinks of alcohol     Types: 14 Shots of liquor per week     Comment: 4-5 X per week , 2-4 shots    Drug use: No     No current facility-administered medications on file prior to encounter.     Current Outpatient Medications on File Prior to Encounter   Medication Sig Dispense Refill    albuterol (PROVENTIL HFA;VENTOLIN HFA) 108 (90 BASE) MCG/ACT inhaler Inhale 2 puffs Every 4 (Four) Hours As Needed for Wheezing. 6.7 g 2    amLODIPine (NORVASC) 2.5 MG tablet Take 1 tablet by mouth Daily.      bisoprolol (ZEBeta) 5 MG tablet Take 1 tablet by mouth Daily.      Carboxymethylcell-Glycerin PF (Refresh Relieva PF) 0.5-1 % solution Administer 1 drop to both eyes 2 (Two) Times a Day.      celecoxib (CeleBREX) 200 MG capsule Take 1 capsule by mouth Daily.      Ferrous Sulfate (IRON) 325 (65 FE) MG tablet Take 1 tablet by mouth Daily.      Fluticasone Furoate-Vilanterol (Breo Ellipta) 200-25 MCG/ACT inhaler Inhale 1 puff Every Night.      furosemide (LASIX) 40 MG tablet Take 1 tablet by mouth Daily.      hydroCHLOROthiazide 12.5 MG tablet Take 1 tablet by mouth Daily.      irbesartan (AVAPRO) 300 MG tablet Take 1 tablet by mouth Daily.      levothyroxine (SYNTHROID, LEVOTHROID) 125 MCG tablet Take 1 tablet by mouth Daily.       omeprazole (priLOSEC) 20 MG capsule Take 1 capsule by mouth Daily.      potassium chloride 10 MEQ CR tablet Take 1 tablet by mouth 2 (Two) Times a Day.      sertraline (ZOLOFT) 100 MG tablet Take 1 tablet by mouth Daily.      simvastatin (ZOCOR) 20 MG tablet Take 1 tablet by mouth Every Night.      [DISCONTINUED] amLODIPine 5 MG tablet 5 mg, valsartan 160 MG tablet 160 mg Take 1 dose by mouth Daily.      [DISCONTINUED] calcium carbonate (OS-RYAN) 600 MG tablet Take 1 tablet by mouth 2 (Two) Times a Day.      [DISCONTINUED] folic acid (FOLVITE) 1 MG tablet Take 1 tablet by mouth Daily.      [DISCONTINUED] Multiple Vitamins-Minerals (ONE-A-DAY 50 PLUS PO) Take 1 tablet by mouth Daily. STOPPED PREOP, LAST DOSE 3/29/17       Allergies   Allergen Reactions    Cefaclor Other (See Comments)     Does not remember    Hydrochlorothiazide Other (See Comments)     Hyponatremia    Penicillins Other (See Comments)     Does not remember    Sulfa Antibiotics Other (See Comments)     Does not remember    Nickel Itching     Has to wear nickel free       Objective    Objective     Vital Signs  Temp:  [96 °F (35.6 °C)-98 °F (36.7 °C)] 97.1 °F (36.2 °C)  Heart Rate:  [] 53  Resp:  [20] 20  BP: (103-180)/(51-89) 160/68  SpO2:  [90 %-100 %] 93 %  on   ;   Device (Oxygen Therapy): room air  Body mass index is 26.52 kg/m².    Physical Exam  Vitals and nursing note reviewed.   Constitutional:       General: She is not in acute distress.     Appearance: She is ill-appearing (Chronically).      Comments: Hard of hearing   Cardiovascular:      Rate and Rhythm: Normal rate and regular rhythm.      Heart sounds: Murmur heard.   Pulmonary:      Effort: Pulmonary effort is normal.      Breath sounds: Normal breath sounds.   Abdominal:      General: Bowel sounds are normal.      Palpations: Abdomen is soft.      Tenderness: There is no abdominal tenderness.   Musculoskeletal:         General: Swelling (Right knee; no erythema or warmth)  present.      Comments: Chronic arthritic changes   Skin:     General: Skin is warm and dry.   Neurological:      Mental Status: She is alert. Mental status is at baseline.         Results Review:  I reviewed the patient's new clinical results.  I reviewed the patient's new imaging results and agree with the interpretation.  I reviewed the patient's other test results and agree with the interpretation  I personally viewed and interpreted the patient's EKG/Telemetry data  Discussed with ED provider.    Lab Results (last 24 hours)       Procedure Component Value Units Date/Time    CBC & Differential [[id number]]  (Abnormal) Collected: 07/28/25 1818    Specimen: Blood Updated: 07/28/25 1828    Narrative:      The following orders were created for panel order CBC & Differential.  Procedure                               Abnormality         Status                     ---------                               -----------         ------                     CBC Auto Differential[[id number]]        Abnormal            Final result                 Please view results for these tests on the individual orders.    Comprehensive Metabolic Panel [[id number]]  (Abnormal) Collected: 07/28/25 1818    Specimen: Blood Updated: 07/28/25 1908     Glucose 95 mg/dL      BUN 32.0 mg/dL      Creatinine 1.47 mg/dL      Sodium 135 mmol/L      Potassium 3.0 mmol/L      Chloride 97 mmol/L      CO2 20.4 mmol/L      Calcium 8.9 mg/dL      Total Protein 6.6 g/dL      Albumin 3.3 g/dL      ALT (SGPT) 7 U/L      AST (SGOT) 15 U/L      Alkaline Phosphatase 128 U/L      Total Bilirubin 0.4 mg/dL      Globulin 3.3 gm/dL      A/G Ratio 1.0 g/dL      BUN/Creatinine Ratio 21.8     Anion Gap 17.6 mmol/L      eGFR 33.8 mL/min/1.73     Narrative:      GFR Categories in Chronic Kidney Disease (CKD)              GFR Category          GFR (mL/min/1.73)    Interpretation  G1                    90 or greater        Normal or high (1)  G2                    60-89                 Mild decrease (1)  G3a                   45-59                Mild to moderate decrease  G3b                   30-44                Moderate to severe decrease  G4                    15-29                Severe decrease  G5                    14 or less           Kidney failure    (1)In the absence of evidence of kidney disease, neither GFR category G1 or G2 fulfill the criteria for CKD.    eGFR calculation 2021 CKD-EPI creatinine equation, which does not include race as a factor    CBC Auto Differential [[id number]]  (Abnormal) Collected: 07/28/25 1818    Specimen: Blood Updated: 07/28/25 1828     WBC 5.81 10*3/mm3      RBC 3.49 10*6/mm3      Hemoglobin 9.3 g/dL      Hematocrit 30.7 %      MCV 88.0 fL      MCH 26.6 pg      MCHC 30.3 g/dL      RDW 15.0 %      RDW-SD 47.3 fl      MPV 9.3 fL      Platelets 261 10*3/mm3      Neutrophil % 72.7 %      Lymphocyte % 12.9 %      Monocyte % 9.6 %      Eosinophil % 4.0 %      Basophil % 0.3 %      Immature Grans % 0.5 %      Neutrophils, Absolute 4.22 10*3/mm3      Lymphocytes, Absolute 0.75 10*3/mm3      Monocytes, Absolute 0.56 10*3/mm3      Eosinophils, Absolute 0.23 10*3/mm3      Basophils, Absolute 0.02 10*3/mm3      Immature Grans, Absolute 0.03 10*3/mm3      nRBC 0.0 /100 WBC     Osmolality, Serum [[id number]]  (Normal) Collected: 07/28/25 1818    Specimen: Blood Updated: 07/28/25 2101     Osmolality 288 mOsm/kg     Magnesium [[id number]]  (Abnormal) Collected: 07/28/25 1818    Specimen: Blood Updated: 07/28/25 2054     Magnesium 1.1 mg/dL     Urinalysis With Culture If Indicated - Urine, Catheter [[id number]]  (Abnormal) Collected: 07/28/25 2115    Specimen: Urine, Catheter Updated: 07/28/25 2139     Color, UA Yellow     Appearance, UA Clear     pH, UA 6.0     Specific Gravity, UA 1.007     Glucose, UA Negative     Ketones, UA Negative     Bilirubin, UA Negative     Blood, UA Negative     Protein, UA Negative     Leuk Esterase, UA Trace     Nitrite, UA Negative      Urobilinogen, UA 0.2 E.U./dL    Narrative:      In absence of clinical symptoms, the presence of pyuria, bacteria, and/or nitrites on the urinalysis result does not correlate with infection.    Sodium, Urine, Random - Urine, Clean Catch [[id number]] Collected: 07/28/25 2115    Specimen: Urine, Clean Catch Updated: 07/28/25 2139     Sodium, Urine 108 mmol/L     Narrative:      Reference intervals for random urine have not been established.  Clinical usage is dependent upon physician's interpretation in combination with other laboratory tests.       Osmolality, Urine - Urine, Clean Catch [[id number]] Collected: 07/28/25 2115    Specimen: Urine, Clean Catch Updated: 07/28/25 2156     Osmolality, Urine 282 mOsm/kg     Narrative:      Osmo Normal Reference Ranges:    Random:  mOsm/kg H2O, depending on fluid intake.  Random: >850 mOsm/kg H20, after 12 hour fluid restriction.    24 Hour: 300-900 mOsm/kg H2O.    Urinalysis, Microscopic Only - Urine, Catheter [[id number]]  (Abnormal) Collected: 07/28/25 2115    Specimen: Urine, Catheter Updated: 07/28/25 2139     RBC, UA 0-2 /HPF      WBC, UA 0-2 /HPF      Comment: Urine culture not indicated.        Bacteria, UA 4+ /HPF      Squamous Epithelial Cells, UA 0-2 /HPF      Hyaline Casts, UA None Seen /LPF      Methodology Automated Microscopy    CBC (No Diff) [[id number]]  (Abnormal) Collected: 07/28/25 2331    Specimen: Blood Updated: 07/29/25 0001     WBC 6.45 10*3/mm3      RBC 3.37 10*6/mm3      Hemoglobin 9.5 g/dL      Hematocrit 29.1 %      MCV 86.4 fL      MCH 28.2 pg      MCHC 32.6 g/dL      RDW 14.2 %      RDW-SD 44.7 fl      MPV 9.7 fL      Platelets 267 10*3/mm3     Magnesium [[id number]]  (Abnormal) Collected: 07/28/25 2331    Specimen: Blood Updated: 07/29/25 0022     Magnesium 1.1 mg/dL     Comprehensive Metabolic Panel [[id number]]  (Abnormal) Collected: 07/28/25 2331    Specimen: Blood Updated: 07/29/25 0022     Glucose 91 mg/dL      BUN 29.0 mg/dL       Creatinine 1.17 mg/dL      Sodium 140 mmol/L      Potassium 3.3 mmol/L      Chloride 102 mmol/L      CO2 23.2 mmol/L      Calcium 8.7 mg/dL      Total Protein 6.5 g/dL      Albumin 3.3 g/dL      ALT (SGPT) 10 U/L      AST (SGOT) 14 U/L      Alkaline Phosphatase 116 U/L      Total Bilirubin 0.3 mg/dL      Globulin 3.2 gm/dL      A/G Ratio 1.0 g/dL      BUN/Creatinine Ratio 24.8     Anion Gap 14.8 mmol/L      eGFR 44.4 mL/min/1.73     Narrative:      GFR Categories in Chronic Kidney Disease (CKD)              GFR Category          GFR (mL/min/1.73)    Interpretation  G1                    90 or greater        Normal or high (1)  G2                    60-89                Mild decrease (1)  G3a                   45-59                Mild to moderate decrease  G3b                   30-44                Moderate to severe decrease  G4                    15-29                Severe decrease  G5                    14 or less           Kidney failure    (1)In the absence of evidence of kidney disease, neither GFR category G1 or G2 fulfill the criteria for CKD.    eGFR calculation 2021 CKD-EPI creatinine equation, which does not include race as a factor    Magnesium [[id number]]  (Normal) Collected: 07/29/25 0334    Specimen: Blood Updated: 07/29/25 0449     Magnesium 1.6 mg/dL     Comprehensive Metabolic Panel [[id number]]  (Abnormal) Collected: 07/29/25 0334    Specimen: Blood Updated: 07/29/25 0430     Glucose 95 mg/dL      BUN 27.0 mg/dL      Creatinine 1.06 mg/dL      Sodium 140 mmol/L      Potassium 3.1 mmol/L      Chloride 103 mmol/L      CO2 23.6 mmol/L      Calcium 8.6 mg/dL      Total Protein 5.9 g/dL      Albumin 3.1 g/dL      ALT (SGPT) 8 U/L      AST (SGOT) 14 U/L      Alkaline Phosphatase 110 U/L      Total Bilirubin 0.3 mg/dL      Globulin 2.8 gm/dL      A/G Ratio 1.1 g/dL      BUN/Creatinine Ratio 25.5     Anion Gap 13.4 mmol/L      eGFR 50.0 mL/min/1.73     Narrative:      GFR Categories in Chronic Kidney  Disease (CKD)              GFR Category          GFR (mL/min/1.73)    Interpretation  G1                    90 or greater        Normal or high (1)  G2                    60-89                Mild decrease (1)  G3a                   45-59                Mild to moderate decrease  G3b                   30-44                Moderate to severe decrease  G4                    15-29                Severe decrease  G5                    14 or less           Kidney failure    (1)In the absence of evidence of kidney disease, neither GFR category G1 or G2 fulfill the criteria for CKD.    eGFR calculation 2021 CKD-EPI creatinine equation, which does not include race as a factor            Imaging Results (Last 24 Hours)       ** No results found for the last 24 hours. **            Results for orders placed during the hospital encounter of 07/26/19    Adult Transthoracic Echo Complete W/ Cont if Necessary Per Protocol 07/28/2019  8:11 PM    Interpretation Summary  · Left ventricular wall thickness is consistent with mild concentric hypertrophy.  · Estimated EF = 61%.  · Left ventricular systolic function is normal.  · There is moderate calcification of the aortic valve.  · Left atrial cavity size is moderately dilated.  · Left ventricular diastolic dysfunction (grade II) consistent with pseudonormalization.  · Mild mitral valve regurgitation is present  · Mild tricuspid valve regurgitation is present.  · Calculated right ventricular systolic pressure from tricuspid regurgitation is 56.0 mmHg.    Telemetry Scan   Final Result        Assessment/Plan   Assessment & Plan   Active Hospital Problems    Diagnosis  POA    **Acute renal failure [N17.9]  Yes    Arthritis [M19.90]  Yes    COPD (chronic obstructive pulmonary disease) [J44.9]  Yes    Hyponatremia [E87.1]  Yes    Hypomagnesemia [E83.42]  Yes    Anemia [D64.9]  Yes    Hypertension [I10]  Yes      Resolved Hospital Problems   No resolved problems to display.       89 y.o.  female admitted with Acute renal failure.    Acute renal failure  -Will consult nephrology for management of recurrent PHUONG in setting of multiple electrolyte abnormalities.  -Renal function improving with creatinine trending from 1.47 to 1.06 and BUN from 32.0 to 27.0.  -Will continue to monitor renal function     Hyponatremia  -History of chronic hyponatremia seems resolved at present time.  -Serum sodium now 140, stable from prior.  -Will continue fluid management per nephrology guidance and monitor sodium closely.    Hypomagnesemia  -Admission magnesium was critically low at 1.1, now improved to 1.6 with replacement.  Of note magnesium was 1.0 at time of last hospitalization last month.  -May be exacerbated by chronic omeprazole use.  -Plan to discontinue omeprazole and switch to Pepcid due to prior PUD history.  -Continue to monitor and replete magnesium as needed per protocol.    Hypokalemia  -Replace potassium per protocol and monitor daily.  -Will continue to correct in tandem with magnesium.    Hypertension  -Present on admission but currently stable.  -Will reassess home regimen    Arthritis  -Pain reportedly worsens when Celebrex is withheld.  -Patient was restarted on Celebrex recently; will aim to maintain renal and electrolyte balance to safely continue it.    Anemia  -Hemoglobin stable at 9.5, similar to baseline.  -Will continue to monitor CBC during hospitalization.     Physical therapy and case management to evaluate as well      SCDs for DVT prophylaxis.  Full code.  Discussed with patient, nursing staff, and ED provider.      Teddy Matthews MD  [facility]  07/29/25  11:11 EDT

## 2025-07-29 NOTE — CONSULTS
RENAL/[facility]    Referring Provider: Dr. Matthews  Reason for Consultation: PHUONG on CKD    Subjective     Chief complaint: Weakness    History of present illness:  Patient is a 89 yo female known to us from recent admission for PHUONG and Hyponatremia.  Baseline Cr is around 0.8.  Na was 130 on recent D/C and HCTZ was stopped.  She was sent home on Amlodipine.  Irbesartan, Lasix, KCl and Celebrex were also stopped at D/C.  She presents now with weakness.  She has a h/o HTN, COPD.  Cr was 1.4 on admission and has improved to 1.  Na 140 and K 3.1.  She denies any N/V/D.  No hematuria or dysuria.  No other complaints.    History  Past Medical History:   Diagnosis Date    Anemia     Anxiety     Arthritis     Cancer 2006    BREAST CANCER, RIGHT MASTECTOMY    Chronic bronchitis     Depression     Disease of thyroid gland     GERD (gastroesophageal reflux disease)     Hard of hearing     Heart murmur     History of kidney stones     History of UTI 02/2017    HAD ABX    Hyperlipidemia     Hypertension     MRSA (methicillin resistant Staphylococcus aureus) infection 2000s    EARLY 2000's, HERE AT [facility], LEFT KNEE  , D/W GISELLE IN INFECTION CONTROL    RBBB     Risk factors for obstructive sleep apnea     Shoulder pain     LEFT   ,   Past Surgical History:   Procedure Laterality Date    APPENDECTOMY      CATARACT EXTRACTION      WITH LENS IMPLANT    CHOLECYSTECTOMY      CYST REMOVAL      ON SPINE    EYE SURGERY      HYSTERECTOMY      JOINT REPLACEMENT      LT KNEE    MASTECTOMY Right 2006    MASTECTOMY RADICAL WITH AXILLARY NODE DISSECTION Right     THYROIDECTOMY      TOTAL SHOULDER ARTHROPLASTY W/ DISTAL CLAVICLE EXCISION Left 4/12/2017    Procedure: LT TOTAL SHOULDER REVERSE ARTHROPLASTY;  Surgeon: Ezequiel Elizalde MD;  [place] [facility] [place] [facility] [place]  Service:    , History reviewed. No pertinent family history.,   Social History     Socioeconomic History    Marital status:    Tobacco Use    Smoking status: Former     Current packs/day: 0.00      Average packs/day: 0.5 packs/day for 35.0 years (17.5 ttl pk-yrs)     Types: Cigarettes     Start date: 1960     Quit date: 1995     Years since quittin.4   Vaping Use    Vaping status: Never Used   Substance and Sexual Activity    Alcohol use: Yes     Alcohol/week: 14.0 standard drinks of alcohol     Types: 14 Shots of liquor per week     Comment: 4-5 X per week , 2-4 shots    Drug use: No    Sexual activity: Defer     E-cigarette/Vaping    E-cigarette/Vaping Use Never User     Passive Exposure No     Counseling Given No      E-cigarette/Vaping Substances    Nicotine No     THC No     CBD No     Flavoring No      E-cigarette/Vaping Devices    Disposable No     Pre-filled or Refillable Cartridge No     Refillable Tank No     Pre-filled Pod No          ,   Medications Prior to Admission   Medication Sig Dispense Refill Last Dose/Taking    albuterol (PROVENTIL HFA;VENTOLIN HFA) 108 (90 BASE) MCG/ACT inhaler Inhale 2 puffs Every 4 (Four) Hours As Needed for Wheezing. 6.7 g 2 Past Month    amLODIPine (NORVASC) 2.5 MG tablet Take 1 tablet by mouth Daily.   Taking    bisoprolol (ZEBeta) 5 MG tablet Take 1 tablet by mouth Daily.   2025 Morning    Carboxymethylcell-Glycerin PF (Refresh Relieva PF) 0.5-1 % solution Administer 1 drop to both eyes 2 (Two) Times a Day.   Taking    celecoxib (CeleBREX) 200 MG capsule Take 1 capsule by mouth Daily.   Taking    Ferrous Sulfate (IRON) 325 (65 FE) MG tablet Take 1 tablet by mouth Daily.   2025 Morning    Fluticasone Furoate-Vilanterol (Breo Ellipta) 200-25 MCG/ACT inhaler Inhale 1 puff Every Night.   Past Week    furosemide (LASIX) 40 MG tablet Take 1 tablet by mouth Daily.   Taking    hydroCHLOROthiazide 12.5 MG tablet Take 1 tablet by mouth Daily.   Taking    irbesartan (AVAPRO) 300 MG tablet Take 1 tablet by mouth Daily.   Taking    levothyroxine (SYNTHROID, LEVOTHROID) 125 MCG tablet Take 1 tablet by mouth Daily.   2025 Morning    omeprazole  (priLOSEC) 20 MG capsule Take 1 capsule by mouth Daily.   7/28/2025 Morning    potassium chloride 10 MEQ CR tablet Take 1 tablet by mouth 2 (Two) Times a Day.   Taking    sertraline (ZOLOFT) 100 MG tablet Take 1 tablet by mouth Daily.   7/28/2025 Morning    simvastatin (ZOCOR) 20 MG tablet Take 1 tablet by mouth Every Night.   Past Week Evening   , Scheduled Meds:  amLODIPine, 2.5 mg, Oral, Daily  atorvastatin, 10 mg, Oral, Nightly  bisoprolol, 5 mg, Oral, Daily  budesonide-formoterol, 2 puff, Inhalation, BID - RT  Carboxymethylcellul-Glycerin, 1 drop, Both Eyes, BID  celecoxib, 200 mg, Oral, Daily  famotidine, 20 mg, Oral, BID AC  [START ON 7/30/2025] levothyroxine, 125 mcg, Oral, Q AM  melatonin, 5 mg, Oral, Nightly  potassium chloride, 40 mEq, Oral, Q4H  sertraline, 100 mg, Oral, Daily   , Continuous Infusions:   , PRN Meds:    acetaminophen **OR** [DISCONTINUED] acetaminophen **OR** [DISCONTINUED] acetaminophen    senna-docusate sodium **AND** polyethylene glycol **AND** bisacodyl **AND** bisacodyl    HYDROcodone-acetaminophen    Magnesium Standard Dose Replacement - Follow Nurse / BPA Driven Protocol    ondansetron    Potassium Replacement - Follow Nurse / BPA Driven Protocol, and Allergies:  Cefaclor, Hydrochlorothiazide, Penicillins, Sulfa antibiotics, and Nickel    Review of Systems  Pertinent items are noted in HPI    Objective     Vital Signs  Temp:  [96 °F (35.6 °C)-98 °F (36.7 °C)] 97.1 °F (36.2 °C)  Heart Rate:  [] 53  Resp:  [20] 20  BP: (103-180)/(51-89) 160/68    No intake/output data recorded.  I/O last 3 completed shifts:  In: 550 [I.V.:50; IV Piggyback:500]  Out: -     Physical Exam:  GEN: Awake, NAD  ENT: PERRL, EOMI, MMM  NECK: Supple, no JVD  CHEST: CTAB, no W/R/C  CV: RRR, no M/G/R  ABD: Soft, NT, +BS  SKIN: Warm and Dry  NEURO: CN's intact      Results Review:   I reviewed the patient's new clinical results.    WBC WBC   Date Value Ref Range Status   07/28/2025 6.45 3.40 - 10.80  "10*3/mm3 Final   07/28/2025 5.81 3.40 - 10.80 10*3/mm3 Final      HGB Hemoglobin   Date Value Ref Range Status   07/28/2025 9.5 (L) 12.0 - 15.9 g/dL Final   07/28/2025 9.3 (L) 12.0 - 15.9 g/dL Final      HCT Hematocrit   Date Value Ref Range Status   07/28/2025 29.1 (L) 34.0 - 46.6 % Final   07/28/2025 30.7 (L) 34.0 - 46.6 % Final      Platlets No results found for: \"LABPLAT\"   MCV MCV   Date Value Ref Range Status   07/28/2025 86.4 79.0 - 97.0 fL Final   07/28/2025 88.0 79.0 - 97.0 fL Final          Sodium Sodium   Date Value Ref Range Status   07/29/2025 136 136 - 145 mmol/L Final   07/29/2025 140 136 - 145 mmol/L Final   07/28/2025 140 136 - 145 mmol/L Final   07/28/2025 135 (L) 136 - 145 mmol/L Final      Potassium Potassium   Date Value Ref Range Status   07/29/2025 3.5 3.5 - 5.2 mmol/L Final   07/29/2025 3.1 (L) 3.5 - 5.2 mmol/L Final   07/28/2025 3.3 (L) 3.5 - 5.2 mmol/L Final   07/28/2025 3.0 (L) 3.5 - 5.2 mmol/L Final      Chloride Chloride   Date Value Ref Range Status   07/29/2025 100 98 - 107 mmol/L Final   07/29/2025 103 98 - 107 mmol/L Final   07/28/2025 102 98 - 107 mmol/L Final   07/28/2025 97 (L) 98 - 107 mmol/L Final      CO2 CO2   Date Value Ref Range Status   07/29/2025 23.9 22.0 - 29.0 mmol/L Final   07/29/2025 23.6 22.0 - 29.0 mmol/L Final   07/28/2025 23.2 22.0 - 29.0 mmol/L Final   07/28/2025 20.4 (L) 22.0 - 29.0 mmol/L Final      BUN BUN   Date Value Ref Range Status   07/29/2025 26.0 (H) 8.0 - 23.0 mg/dL Final   07/29/2025 27.0 (H) 8.0 - 23.0 mg/dL Final   07/28/2025 29.0 (H) 8.0 - 23.0 mg/dL Final   07/28/2025 32.0 (H) 8.0 - 23.0 mg/dL Final      Creatinine Creatinine   Date Value Ref Range Status   07/29/2025 1.05 (H) 0.57 - 1.00 mg/dL Final   07/29/2025 1.06 (H) 0.57 - 1.00 mg/dL Final   07/28/2025 1.17 (H) 0.57 - 1.00 mg/dL Final   07/28/2025 1.47 (H) 0.57 - 1.00 mg/dL Final      Calcium Calcium   Date Value Ref Range Status   07/29/2025 9.1 8.2 - 9.6 mg/dL Final   07/29/2025 8.6 8.2 - " "9.6 mg/dL Final   07/28/2025 8.7 8.2 - 9.6 mg/dL Final   07/28/2025 8.9 8.2 - 9.6 mg/dL Final      PO4 No results found for: \"CAPO4\"   Albumin Albumin   Date Value Ref Range Status   07/29/2025 3.4 (L) 3.5 - 5.2 g/dL Final   07/29/2025 3.1 (L) 3.5 - 5.2 g/dL Final   07/28/2025 3.3 (L) 3.5 - 5.2 g/dL Final   07/28/2025 3.3 (L) 3.5 - 5.2 g/dL Final      Magnesium Magnesium   Date Value Ref Range Status   07/29/2025 1.6 1.6 - 2.4 mg/dL Final   07/28/2025 1.1 (L) 1.6 - 2.4 mg/dL Final   07/28/2025 1.1 (L) 1.6 - 2.4 mg/dL Final      Uric Acid No results found for: \"URICACID\"         amLODIPine, 2.5 mg, Oral, Daily  atorvastatin, 10 mg, Oral, Nightly  bisoprolol, 5 mg, Oral, Daily  budesonide-formoterol, 2 puff, Inhalation, BID - RT  Carboxymethylcellul-Glycerin, 1 drop, Both Eyes, BID  celecoxib, 200 mg, Oral, Daily  famotidine, 20 mg, Oral, BID AC  [START ON 7/30/2025] levothyroxine, 125 mcg, Oral, Q AM  melatonin, 5 mg, Oral, Nightly  potassium chloride, 40 mEq, Oral, Q4H  sertraline, 100 mg, Oral, Daily           Assessment & Plan       Acute renal failure    Hyponatremia    Hypomagnesemia    Hypertension    Anemia    COPD (chronic obstructive pulmonary disease)    Arthritis    Hypokalemia    PLAN: PHUONG resolving with Cr down to 1 (baseline Cr around 0.8).  Keep off diuretics.  Patient euvolemic on exam.  K replaced.  OK to continue Celebrex from a renal standpoint as long as patient hydrating well and renal function remains stable.  Will follow along.          Caden Hahn MD  [facility]  07/29/25  @NOW            "

## 2025-07-29 NOTE — PLAN OF CARE
Goal Outcome Evaluation:  Plan of Care Reviewed With: patient        Progress: no change  Outcome Evaluation: Pt admitted for hypokalemia, hypomagnesmia, and hyponatremia and a PHUONG. IVF @ 100. Pt up x 1 assist to bathroom. Refuses to wear oxygen for SpO2 in the upper 80s. Plan for D/C pending.

## 2025-07-29 NOTE — PLAN OF CARE
Goal Outcome Evaluation:  Plan of Care Reviewed With: patient           Outcome Evaluation: Pt supine in bed upon arrival. Pt admitted with pain to BLEs and family reported she was not acting like herself found to have PHUONG. Pt reports hx of arthritis in her joints. States she needs R shoulder and knee replacement. Reports she lives with her daugher and BORIS. They assist her walking to and from the bathroom using her rollator. She states they assist her as needed with ADLs as well. Today required SBA for supine to sit with increased time. Stood with CGA and mobilized short distance within the room using rwx and min A. Pt reported increased pain in her R knee and stated her knee was fatigued from walking to the bathroom earlier. Had to return to bed due to pain and fatigue. Pt continues to benefit from skilled OT. Pt is adament she will be going home at discharge.    Anticipated Discharge Disposition (OT): home with home health, home with assist, skilled nursing facility (pt adament she will be going home at [place])

## 2025-07-29 NOTE — ED PROVIDER NOTES
EMERGENCY DEPARTMENT ENCOUNTER  Room Number:  25/25  PCP: Heena Phan APRN  Independent Historians: Patient, EMS who brought patient      HPI:  Chief Complaint: had concerns including Weakness - Generalized.  Tingling and numbness of the legs and then arms    A complete HPI/ROS/PMH/PSH/SH/FH are unobtainable due to:   Chronic or social conditions impacting patient care (Social Determinants of Health):       Context: Candice Walker is a 89 y.o. female with a medical history of COPD, hyponatremia, hypertension who presents to the ED c/o acute tingling and numbness of the legs and then the arms.  Patient states she initially had tingling and numbness in both legs but then it went up to both arms.  Symptoms are doing better now.  Family states that patient has been more confused and not acting herself.  She does report she has been trying to follow fluid restriction guidelines which have been given by primary care provider related to hyponatremia.  Denies chest pain or trouble breathing.  Denies nausea vomiting or diarrhea.      Review of prior external notes (non-ED) -and- Review of prior external test results outside of this encounter:   I reviewed prior medical records and note the patient was hospitalized here recently with acute renal failure and hyponatremia.  She was discharged to rehab but has been back at home for several weeks.      Prescription drug monitoring program review:         PAST MEDICAL HISTORY  Active Ambulatory Problems     Diagnosis Date Noted    S/p reverse total shoulder arthroplasty 04/12/2017    Acute UTI (urinary tract infection) 06/17/2025    Hyponatremia 06/17/2025    PHUONG (acute kidney injury) 06/17/2025    Hypomagnesemia 06/17/2025    Hypertension     Anemia     COPD (chronic obstructive pulmonary disease) 06/18/2025    Alcohol use 06/18/2025    DDD (degenerative disc disease), cervical 06/25/2025     Resolved Ambulatory Problems     Diagnosis Date Noted    No Resolved  Ambulatory Problems     Past Medical History:   Diagnosis Date    Anxiety     Arthritis     Cancer 2006    Chronic bronchitis     Depression     Disease of thyroid gland     GERD (gastroesophageal reflux disease)     Hard of hearing     Heart murmur     History of kidney stones     History of UTI 2017    Hyperlipidemia     MRSA (methicillin resistant Staphylococcus aureus) infection     RBBB     Risk factors for obstructive sleep apnea     Shoulder pain          PAST SURGICAL HISTORY  Past Surgical History:   Procedure Laterality Date    APPENDECTOMY      CATARACT EXTRACTION      WITH LENS IMPLANT    CHOLECYSTECTOMY      CYST REMOVAL      ON SPINE    EYE SURGERY      HYSTERECTOMY      JOINT REPLACEMENT      LT KNEE    MASTECTOMY Right 2006    MASTECTOMY RADICAL WITH AXILLARY NODE DISSECTION Right     THYROIDECTOMY      TOTAL SHOULDER ARTHROPLASTY W/ DISTAL CLAVICLE EXCISION Left 2017    Procedure: LT TOTAL SHOULDER REVERSE ARTHROPLASTY;  Surgeon: Ezequiel Elizalde MD;  Location: [facility];  Service:          FAMILY HISTORY  No family history on file.      SOCIAL HISTORY  Social History     Socioeconomic History    Marital status:    Tobacco Use    Smoking status: Former     Current packs/day: 0.00     Average packs/day: 0.5 packs/day for 35.0 years (17.5 ttl pk-yrs)     Types: Cigarettes     Start date: 1960     Quit date: 1995     Years since quittin.4   Vaping Use    Vaping status: Never Used   Substance and Sexual Activity    Alcohol use: Yes     Alcohol/week: 14.0 standard drinks of alcohol     Types: 14 Shots of liquor per week     Comment: 4-5 X per week , 2-4 shots    Drug use: No    Sexual activity: Defer         ALLERGIES  Cefaclor, Hydrochlorothiazide, Penicillins, Sulfa antibiotics, and Nickel      REVIEW OF SYSTEMS  Review of Systems   Constitutional:  Positive for fatigue. Negative for fever.   Respiratory:  Negative for shortness of breath.    Cardiovascular:   Negative for chest pain.   Neurological:  Positive for numbness (Numbness of legs and then moving to numbness of bilateral arms.  Symptoms now resolved.).   All other systems reviewed and are negative.    Included in HPI  All systems reviewed and negative except for those discussed in HPI.      PHYSICAL EXAM    I have reviewed the triage vital signs and nursing notes.    ED Triage Vitals [07/28/25 1805]   Temp Heart Rate Resp BP SpO2   98 °F (36.7 °C) 71 20 164/73 90 %      Temp src Heart Rate Source Patient Position BP Location FiO2 (%)   -- -- -- -- --       Physical Exam  GENERAL: Alert and well-appearing female in no obvious distress.  Triage vitals reviewed and are fairly benign.  O2 sats reported 90% on room air although running mid 90s on room air on my exam.  SKIN: Warm, dry  HENT: Normocephalic, atraumatic  EYES: no scleral icterus  CV: regular rhythm, regular rate-no murmur  RESPIRATORY: normal effort,  slightly decreased breath sounds bilaterally-O2 sats mid 90s room air   ABDOMEN: soft, nontender, nondistended  MUSCULOSKELETAL: no deformity  NEURO: alert, moves all extremities, follows commands      LAB RESULTS  Recent Results (from the past 24 hours)   Comprehensive Metabolic Panel    Collection Time: 07/28/25  6:18 PM    Specimen: Blood   Result Value Ref Range    Glucose 95 65 - 99 mg/dL    BUN 32.0 (H) 8.0 - 23.0 mg/dL    Creatinine 1.47 (H) 0.57 - 1.00 mg/dL    Sodium 135 (L) 136 - 145 mmol/L    Potassium 3.0 (L) 3.5 - 5.2 mmol/L    Chloride 97 (L) 98 - 107 mmol/L    CO2 20.4 (L) 22.0 - 29.0 mmol/L    Calcium 8.9 8.2 - 9.6 mg/dL    Total Protein 6.6 6.0 - 8.5 g/dL    Albumin 3.3 (L) 3.5 - 5.2 g/dL    ALT (SGPT) 7 1 - 33 U/L    AST (SGOT) 15 1 - 32 U/L    Alkaline Phosphatase 128 (H) 39 - 117 U/L    Total Bilirubin 0.4 0.0 - 1.2 mg/dL    Globulin 3.3 gm/dL    A/G Ratio 1.0 g/dL    BUN/Creatinine Ratio 21.8 7.0 - 25.0    Anion Gap 17.6 (H) 5.0 - 15.0 mmol/L    eGFR 33.8 (L) >60.0 mL/min/1.73   CBC  Auto Differential    Collection Time: 07/28/25  6:18 PM    Specimen: Blood   Result Value Ref Range    WBC 5.81 3.40 - 10.80 10*3/mm3    RBC 3.49 (L) 3.77 - 5.28 10*6/mm3    Hemoglobin 9.3 (L) 12.0 - 15.9 g/dL    Hematocrit 30.7 (L) 34.0 - 46.6 %    MCV 88.0 79.0 - 97.0 fL    MCH 26.6 26.6 - 33.0 pg    MCHC 30.3 (L) 31.5 - 35.7 g/dL    RDW 15.0 12.3 - 15.4 %    RDW-SD 47.3 37.0 - 54.0 fl    MPV 9.3 6.0 - 12.0 fL    Platelets 261 140 - 450 10*3/mm3    Neutrophil % 72.7 42.7 - 76.0 %    Lymphocyte % 12.9 (L) 19.6 - 45.3 %    Monocyte % 9.6 5.0 - 12.0 %    Eosinophil % 4.0 0.3 - 6.2 %    Basophil % 0.3 0.0 - 1.5 %    Immature Grans % 0.5 0.0 - 0.5 %    Neutrophils, Absolute 4.22 1.70 - 7.00 10*3/mm3    Lymphocytes, Absolute 0.75 0.70 - 3.10 10*3/mm3    Monocytes, Absolute 0.56 0.10 - 0.90 10*3/mm3    Eosinophils, Absolute 0.23 0.00 - 0.40 10*3/mm3    Basophils, Absolute 0.02 0.00 - 0.20 10*3/mm3    Immature Grans, Absolute 0.03 0.00 - 0.05 10*3/mm3    nRBC 0.0 0.0 - 0.2 /100 WBC   Green Top (Gel)    Collection Time: 07/28/25  6:18 PM   Result Value Ref Range    Extra Tube Hold for add-ons.    Lavender Top    Collection Time: 07/28/25  6:18 PM   Result Value Ref Range    Extra Tube hold for add-on    Gold Top - SST    Collection Time: 07/28/25  6:18 PM   Result Value Ref Range    Extra Tube Hold for add-ons.    Light Blue Top    Collection Time: 07/28/25  6:18 PM   Result Value Ref Range    Extra Tube Hold for add-ons.    Osmolality, Serum    Collection Time: 07/28/25  6:18 PM    Specimen: Blood   Result Value Ref Range    Osmolality 288 280 - 301 mOsm/kg   Magnesium    Collection Time: 07/28/25  6:18 PM    Specimen: Blood   Result Value Ref Range    Magnesium 1.1 (L) 1.6 - 2.4 mg/dL   Urinalysis With Culture If Indicated - Urine, Catheter    Collection Time: 07/28/25  9:15 PM    Specimen: Urine, Catheter   Result Value Ref Range    Color, UA Yellow Yellow, Straw    Appearance, UA Clear Clear    pH, UA 6.0 5.0 - 8.0     Specific Gravity, UA 1.007 1.005 - 1.030    Glucose, UA Negative Negative    Ketones, UA Negative Negative    Bilirubin, UA Negative Negative    Blood, UA Negative Negative    Protein, UA Negative Negative    Leuk Esterase, UA Trace (A) Negative    Nitrite, UA Negative Negative    Urobilinogen, UA 0.2 E.U./dL 0.2 - 1.0 E.U./dL   Sodium, Urine, Random - Urine, Clean Catch    Collection Time: 07/28/25  9:15 PM    Specimen: Urine, Clean Catch   Result Value Ref Range    Sodium, Urine 108 mmol/L   Osmolality, Urine - Urine, Clean Catch    Collection Time: 07/28/25  9:15 PM    Specimen: Urine, Clean Catch   Result Value Ref Range    Osmolality, Urine 282 mOsm/kg   Urinalysis, Microscopic Only - Urine, Catheter    Collection Time: 07/28/25  9:15 PM    Specimen: Urine, Catheter   Result Value Ref Range    RBC, UA 0-2 None Seen, 0-2 /HPF    WBC, UA 0-2 None Seen, 0-2 /HPF    Bacteria, UA 4+ (A) None Seen /HPF    Squamous Epithelial Cells, UA 0-2 None Seen, 0-2 /HPF    Hyaline Casts, UA None Seen None Seen /LPF    Methodology Automated Microscopy          RADIOLOGY  No Radiology Exams Resulted Within Past 24 Hours      MEDICATIONS GIVEN IN ER  Medications   magnesium sulfate 2g/50 mL (PREMIX) infusion (has no administration in time range)   sodium chloride 0.9 % bolus 500 mL (500 mL Intravenous New Bag 7/28/25 2114)   potassium chloride (KLOR-CON M20) CR tablet 40 mEq (40 mEq Oral Given 7/28/25 2103)         ORDERS PLACED DURING THIS VISIT:  Orders Placed This Encounter   Procedures    Comprehensive Metabolic Panel    [place] Draw    CBC Auto Differential    Urinalysis With Culture If Indicated - Urine, Catheter    Osmolality, Serum    Sodium, Urine, Random - Urine, Clean Catch    Osmolality, Urine - Urine, Clean Catch    Magnesium    Urinalysis, Microscopic Only - Urine, Clean Catch    [facility] (on-call MD unless specified) Details    Initiate Observation Status    CBC & Differential    Green Top (Gel)    Lavender Top    Gold  Top - SST    Light Blue Top         OUTPATIENT MEDICATION MANAGEMENT:  Current Facility-Administered Medications Ordered in Epic   Medication Dose Route Frequency Provider Last Rate Last Admin    magnesium sulfate 2g/50 mL (PREMIX) infusion  2 g Intravenous Once [place], Caden BOWLES MD         Current Outpatient Medications Ordered in Epic   Medication Sig Dispense Refill    albuterol (PROVENTIL HFA;VENTOLIN HFA) 108 (90 BASE) MCG/ACT inhaler Inhale 2 puffs Every 4 (Four) Hours As Needed for Wheezing. 6.7 g 2    amLODIPine 5 MG tablet 5 mg, valsartan 160 MG tablet 160 mg Take 1 dose by mouth Daily.      bisoprolol (ZEBeta) 5 MG tablet Take 1 tablet by mouth Daily.      calcium carbonate (OS-RYAN) 600 MG tablet Take 1 tablet by mouth 2 (Two) Times a Day.      Ferrous Sulfate (IRON) 325 (65 FE) MG tablet Take 1 tablet by mouth Daily.      Fluticasone Furoate-Vilanterol (Breo Ellipta) 200-25 MCG/ACT inhaler Inhale 1 puff Every Night.      folic acid (FOLVITE) 1 MG tablet Take 1 tablet by mouth Daily.      levothyroxine (SYNTHROID, LEVOTHROID) 125 MCG tablet Take 1 tablet by mouth Daily.      Multiple Vitamins-Minerals (ONE-A-DAY 50 PLUS PO) Take 1 tablet by mouth Daily. STOPPED PREOP, LAST DOSE 3/29/17      omeprazole (priLOSEC) 20 MG capsule Take 1 capsule by mouth Daily.      sertraline (ZOLOFT) 100 MG tablet Take 1 tablet by mouth Daily.      simvastatin (ZOCOR) 20 MG tablet Take 1 tablet by mouth Every Night.           PROCEDURES  Procedures            PROGRESS, DATA ANALYSIS, CONSULTS, AND MEDICAL DECISION MAKING  All labs have been independently interpreted by me.  All radiology studies have been reviewed by me. All EKG's have been independently viewed and interpreted by me.  Discussion below represents my analysis of pertinent findings related to patient's condition, differential diagnosis, treatment plan and final disposition.    Differential diagnosis includes but is not limited to electrolyte disturbance,  hyponatremia, arrhythmia,urinary tract infection.      ED Course as of 07/28/25 2220 Mon Jul 28, 2025 2219 I did discuss evaluation and treatment of this patient with Jeny from [facility] will admit behalf of Dr. Aleksandar Mobley [DB]   2220 Patient does report that she was recently started back on Celebrex.  This certainly could be part of the reason that her creatinine has elevated [DB]      ED Course User Index  [DB] Caden Sorto MD             AS OF 22:20 EDT VITALS:    BP - 103/89  HR - 57  TEMP - 98 °F (36.7 °C)  O2 SATS - 95%    COMPLEXITY OF CARE  89-year-old female with history of hypertension, COPD, hyponatremia presents with tingling and numbness to legs and then arms.  Symptoms have subsequently resolved.  Family does report that she had been more confused than usual    I did independently evaluate interpret all ED testing notable for the following    CBC notable for mild anemia with hemoglobin 9.3 near baseline, white blood count normal  Urinalysis did show 4+ bacteria but no whites and is yellow and clear.  Patient is not having urinary symptoms and I do not feel that record represents acute urinary tract infection  Chemistries notable for elevated BUN/creatinine of 32 and 1.47 suggestive of acute renal failure, likely volume depletion.  Sodium 135 is improved from prior values    Given patient's elevated creatinine with tingling and numbness and confusion per family will admit for IV fluids.  I do not feel that patient does have a urinary tract infection without fever, leukocytosis or urinary symptoms.  Will give fluids overnight, follow sodium levels hopefully discharge and feeling better in AM.  Patient also found to be somewhat hypokalemic with potassium 3.0 and will be replaced with oral potassium      DIAGNOSIS  Final diagnoses:   Acute renal failure, unspecified acute renal failure type   Hyponatremia   Hypomagnesemia   Hypokalemia         DISPOSITION  ED Disposition       ED Disposition    Decision to Admit    Condition   --    Comment   Level of Care: Med/Surg [1]   Diagnosis: Acute renal failure [692399]   Admitting Physician: CINDI RASCON [[id number]]   Attending Physician: CINDI RASCON [[id number]]   Is patient appropriate for Inpatient Observation Unit?: Yes [1]                  Please note that portions of this document were completed with a voice recognition program.    Note Disclaimer: At [facility], we believe that sharing information builds trust and better relationships. You are receiving this note because you recently visited [facility]. It is possible you will see health information before a provider has talked with you about it. This kind of information can be easy to misunderstand. To help you fully understand what it means for your health, we urge you to discuss this note with your provider.         Caden Sorto MD  07/28/25 [id number]

## 2025-07-30 LAB
ALBUMIN SERPL-MCNC: 2.9 G/DL (ref 3.5–5.2)
ALBUMIN/GLOB SERPL: 1.1 G/DL
ALP SERPL-CCNC: 99 U/L (ref 39–117)
ALT SERPL W P-5'-P-CCNC: 6 U/L (ref 1–33)
ANION GAP SERPL CALCULATED.3IONS-SCNC: 12 MMOL/L (ref 5–15)
AST SERPL-CCNC: 9 U/L (ref 1–32)
BILIRUB SERPL-MCNC: 0.3 MG/DL (ref 0–1.2)
BUN SERPL-MCNC: 27 MG/DL (ref 8–23)
BUN/CREAT SERPL: 25.2 (ref 7–25)
CALCIUM SPEC-SCNC: 8.8 MG/DL (ref 8.2–9.6)
CHLORIDE SERPL-SCNC: 104 MMOL/L (ref 98–107)
CO2 SERPL-SCNC: 24 MMOL/L (ref 22–29)
CREAT SERPL-MCNC: 1.07 MG/DL (ref 0.57–1)
DEPRECATED RDW RBC AUTO: 46 FL (ref 37–54)
EGFRCR SERPLBLD CKD-EPI 2021: 49.4 ML/MIN/1.73
ERYTHROCYTE [DISTWIDTH] IN BLOOD BY AUTOMATED COUNT: 14.3 % (ref 12.3–15.4)
GLOBULIN UR ELPH-MCNC: 2.7 GM/DL
GLUCOSE SERPL-MCNC: 84 MG/DL (ref 65–99)
HCT VFR BLD AUTO: 26.8 % (ref 34–46.6)
HGB BLD-MCNC: 8.2 G/DL (ref 12–15.9)
MAGNESIUM SERPL-MCNC: 1.4 MG/DL (ref 1.6–2.4)
MCH RBC QN AUTO: 27.1 PG (ref 26.6–33)
MCHC RBC AUTO-ENTMCNC: 30.6 G/DL (ref 31.5–35.7)
MCV RBC AUTO: 88.4 FL (ref 79–97)
PHOSPHATE SERPL-MCNC: 3.6 MG/DL (ref 2.5–4.5)
PLATELET # BLD AUTO: 233 10*3/MM3 (ref 140–450)
PMV BLD AUTO: 9.5 FL (ref 6–12)
POTASSIUM SERPL-SCNC: 3.3 MMOL/L (ref 3.5–5.2)
PROT SERPL-MCNC: 5.6 G/DL (ref 6–8.5)
RBC # BLD AUTO: 3.03 10*6/MM3 (ref 3.77–5.28)
SODIUM SERPL-SCNC: 140 MMOL/L (ref 136–145)
WBC NRBC COR # BLD AUTO: 4.95 10*3/MM3 (ref 3.4–10.8)

## 2025-07-30 PROCEDURE — 97162 PT EVAL MOD COMPLEX 30 MIN: CPT

## 2025-07-30 PROCEDURE — G0378 HOSPITAL OBSERVATION PER HR: HCPCS

## 2025-07-30 PROCEDURE — 97110 THERAPEUTIC EXERCISES: CPT

## 2025-07-30 PROCEDURE — 83735 ASSAY OF MAGNESIUM: CPT | Performed by: NURSE PRACTITIONER

## 2025-07-30 PROCEDURE — 85027 COMPLETE CBC AUTOMATED: CPT | Performed by: HOSPITALIST

## 2025-07-30 PROCEDURE — 80053 COMPREHEN METABOLIC PANEL: CPT | Performed by: NURSE PRACTITIONER

## 2025-07-30 PROCEDURE — 36415 COLL VENOUS BLD VENIPUNCTURE: CPT | Performed by: NURSE PRACTITIONER

## 2025-07-30 PROCEDURE — 84100 ASSAY OF PHOSPHORUS: CPT | Performed by: HOSPITALIST

## 2025-07-30 RX ORDER — POTASSIUM CHLORIDE 1500 MG/1
20 TABLET, EXTENDED RELEASE ORAL DAILY
Status: DISCONTINUED | OUTPATIENT
Start: 2025-07-30 | End: 2025-07-31

## 2025-07-30 RX ADMIN — SERTRALINE HYDROCHLORIDE 100 MG: 100 TABLET, FILM COATED ORAL at 08:10

## 2025-07-30 RX ADMIN — Medication 5 MG: at 00:21

## 2025-07-30 RX ADMIN — HYDROCODONE BITARTRATE AND ACETAMINOPHEN 1 TABLET: 5; 325 TABLET ORAL at 08:10

## 2025-07-30 RX ADMIN — Medication 5 MG: at 22:47

## 2025-07-30 RX ADMIN — LEVOTHYROXINE SODIUM 125 MCG: 125 TABLET ORAL at 08:10

## 2025-07-30 RX ADMIN — BISOPROLOL FUMARATE 5 MG: 5 TABLET ORAL at 08:10

## 2025-07-30 RX ADMIN — CELECOXIB 200 MG: 200 CAPSULE ORAL at 08:10

## 2025-07-30 RX ADMIN — MAGNESIUM OXIDE TAB 400 MG (240 MG ELEMENTAL MG) 400 MG: 400 (240 MG) TAB at 08:10

## 2025-07-30 RX ADMIN — FAMOTIDINE 20 MG: 20 TABLET, FILM COATED ORAL at 16:54

## 2025-07-30 RX ADMIN — ATORVASTATIN CALCIUM 10 MG: 20 TABLET, FILM COATED ORAL at 22:47

## 2025-07-30 RX ADMIN — POTASSIUM CHLORIDE 20 MEQ: 1500 TABLET, EXTENDED RELEASE ORAL at 08:10

## 2025-07-30 RX ADMIN — ATORVASTATIN CALCIUM 10 MG: 20 TABLET, FILM COATED ORAL at 00:21

## 2025-07-30 RX ADMIN — AMLODIPINE BESYLATE 2.5 MG: 2.5 TABLET ORAL at 08:10

## 2025-07-30 RX ADMIN — FAMOTIDINE 20 MG: 20 TABLET, FILM COATED ORAL at 08:10

## 2025-07-30 NOTE — THERAPY EVALUATION
Patient Name: Candice Walker  : 1935    MRN: [id number]                              Today's Date: 2025       Admit Date: 2025    Visit Dx:     ICD-10-CM ICD-9-CM   1. Acute renal failure, unspecified acute renal failure type  N17.9 584.9   2. Hyponatremia  E87.1 276.1   3. Hypomagnesemia  E83.42 275.2   4. Hypokalemia  E87.6 276.8     Patient Active Problem List   Diagnosis    S/p reverse total shoulder arthroplasty    Acute UTI (urinary tract infection)    Hyponatremia    PHUONG (acute kidney injury)    Hypomagnesemia    Hypertension    Anemia    COPD (chronic obstructive pulmonary disease)    Alcohol use    DDD (degenerative disc disease), cervical    Acute renal failure    Arthritis     Past Medical History:   Diagnosis Date    Anemia     Anxiety     Arthritis     Cancer 2006    BREAST CANCER, RIGHT MASTECTOMY    Chronic bronchitis     Depression     Disease of thyroid gland     GERD (gastroesophageal reflux disease)     Hard of hearing     Heart murmur     History of kidney stones     History of UTI 2017    HAD ABX    Hyperlipidemia     Hypertension     MRSA (methicillin resistant Staphylococcus aureus) infection s    EARLY , HERE AT [facility], LEFT KNEE  , D/W GISELLE IN INFECTION CONTROL    RBBB     Risk factors for obstructive sleep apnea     Shoulder pain     LEFT     Past Surgical History:   Procedure Laterality Date    APPENDECTOMY      CATARACT EXTRACTION      WITH LENS IMPLANT    CHOLECYSTECTOMY      CYST REMOVAL      ON SPINE    EYE SURGERY      HYSTERECTOMY      JOINT REPLACEMENT      LT KNEE    MASTECTOMY Right 2006    MASTECTOMY RADICAL WITH AXILLARY NODE DISSECTION Right     THYROIDECTOMY      TOTAL SHOULDER ARTHROPLASTY W/ DISTAL CLAVICLE EXCISION Left 2017    Procedure: LT TOTAL SHOULDER REVERSE ARTHROPLASTY;  Surgeon: Ezequiel Elizalde MD;  Location: [facility] OR [facility] [place];  Service:       General Information       Row Name 25 1148          Physical Therapy Time and Intention     Document Type evaluation  -MS     Mode of Treatment physical therapy  -MS       Row Name 07/30/25 1148          General Information    Patient Profile Reviewed yes  -MS     Prior Level of Function independent:;all household mobility  Rollator, lives with family who assist as needed  -MS     Existing Precautions/Restrictions fall  [place]  -MS     Barriers to Rehab none identified  -MS       Row Name 07/30/25 1148          Living Environment    Current Living Arrangements home  -MS     People in Home child(greg), adult;grandchild(greg)  -MS       Row Name 07/30/25 1148          Home Main Entrance    Number of Stairs, Main Entrance none  -MS       Row Name 07/30/25 1148          Stairs Within Home, Primary    Stairs, Within Home, Primary Stairs to basement only.  -MS       Row Name 07/30/25 1148          Cognition    Orientation Status (Cognition) oriented x 3  -MS       Row Name 07/30/25 1148          Safety Issues/Impairments Affecting Functional Mobility    Safety Issues Affecting Function (Mobility) positioning of assistive device;sequencing abilities  -MS     Impairments Affecting Function (Mobility) balance;endurance/activity tolerance;strength;pain  -MS     Comment, Safety Issues/Impairments (Mobility) Gait belt and non skid socks donned.  -MS               User Key  (r) = Recorded By, (t) = Taken By, (c) = Cosigned By      Initials Name Provider Type    MS Izabel Toussaint, PT Physical Therapist                   Mobility       Row Name 07/30/25 1148          Bed Mobility    Bed Mobility supine-sit  -MS     Supine-Sit Andrew (Bed Mobility) standby assist;verbal cues  -MS     Assistive Device (Bed Mobility) bed rails;head of bed elevated  -MS     Comment, (Bed Mobility) SV for sitting balance.  -MS       Row Name 07/30/25 1148          Transfers    Comment, (Transfers) Sequencing and hand placement cues.  -MS       Row Name 07/30/25 1148          Sit-Stand Transfer    Sit-Stand Andrew (Transfers)  contact guard;verbal cues;nonverbal cues (demo/gesture)  -MS     Assistive Device (Sit-Stand Transfers) walker, front-wheeled  -MS       Row Name 07/30/25 1148          Gait/Stairs (Locomotion)    [place] Level (Gait) minimum assist (75% patient effort);verbal cues;nonverbal cues (demo/gesture)  -MS     Assistive Device (Gait) walker, front-wheeled  -MS     Patient was able to Ambulate yes  -MS     Distance in Feet (Gait) 25  -MS     Deviations/Abnormal Patterns (Gait) lata decreased;gait speed decreased  -MS     Bilateral Gait Deviations forward flexed posture  -MS     Comment, (Gait/Stairs) No overt LOB or veering noted. Agreeable to sitting UIC post gait.  -MS               User Key  (r) = Recorded By, (t) = Taken By, (c) = Cosigned By      Initials Name Provider Type    Izabel Gamble, PT Physical Therapist                   Obj/Interventions       Row Name 07/30/25 1149          Range of Motion Comprehensive    Comment, General Range of Motion B LEs grossly 75% WFL  -MS       Row Name 07/30/25 1149          Strength Comprehensive (MMT)    Comment, General Manual Muscle Testing (MMT) Assessment B LEs grossly 4/5, generalized weakness noted.  -MS       Row Name 07/30/25 1149          Balance    Static Sitting Balance supervision  -MS     Dynamic Sitting Balance supervision  -MS     Static Standing Balance contact guard  -MS     Dynamic Standing Balance contact guard  -MS     Position/Device Used, Standing Balance supported;walker, front-wheeled  -MS       Row Name 07/30/25 1149          Sensory Assessment (Somatosensory)    Sensory Assessment (Somatosensory) sensation intact  -MS               User Key  (r) = Recorded By, (t) = Taken By, (c) = Cosigned By      Initials Name Provider Type    Izabel Gamble, PT Physical Therapist                   Goals/Plan       Row Name 07/30/25 1150          Bed Mobility Goal 1 (PT)    Activity/Assistive Device (Bed Mobility Goal 1, PT) bed mobility  activities, all  -MS     [place] Level/Cues Needed (Bed Mobility Goal 1, PT) supervision required  -MS     Time Frame (Bed Mobility Goal 1, PT) 1 week  -MS       Row Name 07/30/25 1150          Transfer Goal 1 (PT)    Activity/Assistive Device (Transfer Goal 1, PT) transfers, all;walker, rolling  -MS     [place] Level/Cues Needed (Transfer Goal 1, PT) supervision required  -MS     Time Frame (Transfer Goal 1, PT) 1 week  -MS       Row Name 07/30/25 1150          Gait Training Goal 1 (PT)    Activity/Assistive Device (Gait Training Goal 1, PT) gait (walking locomotion);walker, rolling  -MS     [place] Level (Gait Training Goal 1, PT) supervision required  -MS     Distance (Gait Training Goal 1, PT) 100  -MS     Time Frame (Gait Training Goal 1, PT) 1 week  -MS       Row Name 07/30/25 1150          Therapy Assessment/Plan (PT)    Planned Therapy Interventions (PT) balance training;bed mobility training;gait training;home exercise program;postural re-education;patient/family education;ROM (range of motion);stair training;strengthening;transfer training  -MS               User Key  (r) = Recorded By, (t) = Taken By, (c) = Cosigned By      Initials Name Provider Type    MS Izabel Toussaint, PT Physical Therapist                   Clinical Impression       Row Name 07/30/25 1150          Pain    Pretreatment Pain Rating 0/10 - no pain  -MS     Posttreatment Pain Rating 0/10 - no pain  -MS     Pain Side/Orientation generalized  -MS     Pain Management Interventions exercise or physical activity utilized;positioning techniques utilized  -MS     Response to Pain Interventions activity participation with tolerable pain  -MS       Row Name 07/30/25 1150          Therapy Assessment/Plan (PT)    Rehab Potential (PT) good  -MS     Criteria for Skilled Interventions Met (PT) yes;meets criteria  -MS     Therapy Frequency (PT) 5 times/wk  -MS       Row Name 07/30/25 1150          Vital Signs    O2 Delivery Pre  Treatment room air  -MS       Row Name 07/30/25 1150          Positioning and Restraints    Pre-Treatment Position in bed  -MS     Post Treatment Position chair  -MS     In Chair notified nsg;reclined;call light within reach;encouraged to call for assist;exit alarm on  -MS               User Key  (r) = Recorded By, (t) = Taken By, (c) = Cosigned By      Initials Name Provider Type    MS ToussaintPipoie MOI, PT Physical Therapist                   Outcome Measures       Row Name 07/30/25 1150 07/30/25 0810       How much help from another person do you currently need...    Turning from your back to your side while in flat bed without using bedrails? 3  -MS 3  -KM    Moving from lying on back to sitting on the side of a flat bed without bedrails? 3  -MS 3  -KM    Moving to and from a bed to a chair (including a wheelchair)? 3  -MS 3  -KM    Standing up from a chair using your arms (e.g., wheelchair, bedside chair)? 3  -MS 3  -KM    Climbing 3-5 steps with a railing? 2  -MS 1  -KM    To walk in hospital room? 3  -MS 3  -KM    AM-PAC 6 Clicks Score (PT) 17  -MS 16  -KM      Row Name 07/30/25 0021          How much help from another person do you currently need...    Turning from your back to your side while in flat bed without using bedrails? 3  -BC     Moving from lying on back to sitting on the side of a flat bed without bedrails? 3  -BC     Moving to and from a bed to a chair (including a wheelchair)? 3  -BC     Standing up from a chair using your arms (e.g., wheelchair, bedside chair)? 3  -BC     Climbing 3-5 steps with a railing? 1  -BC     To walk in hospital room? 3  -BC     AM-PAC 6 Clicks Score (PT) 16  -BC               User Key  (r) = Recorded By, (t) = Taken By, (c) = Cosigned By      Initials Name Provider Type    MS ToussaintIzabel, PT Physical Therapist    Ammy Astudillo, RN Registered Nurse    Uday Freed RN Registered Nurse                                 Physical Therapy Education        Title: PT OT SLP Therapies (Done)       Topic: Physical Therapy (Done)       Point: Mobility training (Done)       Learning Progress Summary            Patient Acceptance, E,TB, VU by MS at 7/30/2025 1150                      Point: Home exercise program (Done)       Learning Progress Summary            Patient Acceptance, E,TB, VU by MS at 7/30/2025 1150                      Point: Body mechanics (Done)       Learning Progress Summary            Patient Acceptance, E,TB, VU by MS at 7/30/2025 1150                      Point: Precautions (Done)       Learning Progress Summary            Patient Acceptance, E,TB, VU by MS at 7/30/2025 1150                                      User Key       Initials Effective Dates Name Provider Type Discipline    MS 06/16/21 -  Izabel Toussaint PT Physical Therapist PT                  PT Recommendation and Plan  Planned Therapy Interventions (PT): balance training, bed mobility training, gait training, home exercise program, postural re-education, patient/family education, ROM (range of motion), stair training, strengthening, transfer training        Time Calculation:         PT Charges       Row Name 07/30/25 1147             Time Calculation    Start Time 0910  -MS      Stop Time 0925  -MS      Time Calculation (min) 15 min  -MS      PT Received On 07/30/25  -MS      PT - Next Appointment 07/31/25  -MS      PT Goal Re-Cert Due Date 08/06/25  -MS                User Key  (r) = Recorded By, (t) = Taken By, (c) = Cosigned By      Initials Name Provider Type    MS ToussaintIzabel PT Physical Therapist                  Therapy Charges for Today       Code Description Service Date Service Provider Modifiers Qty    36492734912 HC PT EVAL MOD COMPLEXITY 3 7/30/2025 Izabel Toussaint, PT GP 1    20191722970 HC PT THER PROC EA 15 MIN 7/30/2025 Izabel Toussaint, PT GP 1            PT G-Codes  AM-PAC 6 Clicks Score (PT): 17  PT Discharge Summary  Anticipated Discharge Disposition  (PT): home with assist, home with home health    Izabel Toussaint, PT  7/30/2025

## 2025-07-30 NOTE — PROGRESS NOTES
Name: Candice Walker ADMIT: 2025   : 1935  PCP: Heena Phan APRN    MRN: [id number] LOS: 0 days   AGE/SEX: 89 y.o. female  [place] [facility] [place]     Subjective   Subjective   Feels a little better still having arthritic pain primarily in her hands.       Objective   Objective   Vital Signs  Temp:  [97 °F (36.1 °C)-98 °F (36.7 °C)] 97.5 °F (36.4 °C)  Heart Rate:  [48-57] 48  Resp:  [18] 18  BP: (129-174)/(64-74) 148/74  SpO2:  [91 %-98 %] 97 %  on   ;   Device (Oxygen Therapy): room air  Body mass index is 26.52 kg/m².  Physical Exam  Vitals and nursing note reviewed.   Constitutional:       General: She is not in acute distress.  Cardiovascular:      Rate and Rhythm: Normal rate and regular rhythm.   Pulmonary:      Effort: Pulmonary effort is normal.      Breath sounds: Normal breath sounds.   Abdominal:      General: Bowel sounds are normal.      Palpations: Abdomen is soft.      Tenderness: There is no abdominal tenderness.   Musculoskeletal:         General: No swelling.   Skin:     General: Skin is warm and dry.   Neurological:      Mental Status: She is alert. Mental status is at baseline.       Results Review     I reviewed the patient's new clinical results.  Results from last 7 days   Lab Units 25  0527 25  2331 25  1818   WBC 10*3/mm3 4.95 6.45 5.81   HEMOGLOBIN g/dL 8.2* 9.5* 9.3*   PLATELETS 10*3/mm3 233 267 261     Results from last 7 days   Lab Units 25  0527 25  2324 25  1149 25  0334 25  2331   SODIUM mmol/L 140  --  136 140 140   POTASSIUM mmol/L 3.3* 3.5 3.5 3.1* 3.3*   CHLORIDE mmol/L 104  --  100 103 102   CO2 mmol/L 24.0  --  23.9 23.6 23.2   BUN mg/dL 27.0*  --  26.0* 27.0* 29.0*   CREATININE mg/dL 1.07*  --  1.05* 1.06* 1.17*   GLUCOSE mg/dL 84  --  121* 95 91   EGFR mL/min/1.73 49.4*  --  50.6* 50.0* 44.4*     Results from last 7 days   Lab Units 25  0527 25  1149 25  0334 25  2331 25  1819  "  ALBUMIN g/dL 2.9* 3.4* 3.1* 3.3* 3.3*   BILIRUBIN mg/dL 0.3  --  0.3 0.3 0.4   ALK PHOS U/L 99  --  110 116 128*   AST (SGOT) U/L 9  --  14 14 15   ALT (SGPT) U/L 6  --  8 10 7     Results from last 7 days   Lab Units 07/30/25  0527 07/29/25  1149 07/29/25  0334 07/28/25  2331 07/28/25  1818   CALCIUM mg/dL 8.8 9.1 8.6 8.7 8.9   ALBUMIN g/dL 2.9* 3.4* 3.1* 3.3* 3.3*   MAGNESIUM mg/dL 1.4*  --  1.6 1.1* 1.1*   PHOSPHORUS mg/dL 3.6 3.8 3.8  --   --        No results found for: \"HGBA1C\", \"POCGLU\"    No radiology results for the last day    I have personally reviewed all medications:  Scheduled Medications  amLODIPine, 2.5 mg, Oral, Daily  atorvastatin, 10 mg, Oral, Nightly  bisoprolol, 5 mg, Oral, Daily  budesonide-formoterol, 2 puff, Inhalation, BID - RT  Carboxymethylcellul-Glycerin, 1 drop, Both Eyes, BID  celecoxib, 200 mg, Oral, Daily  famotidine, 20 mg, Oral, BID AC  levothyroxine, 125 mcg, Oral, Q AM  magnesium oxide, 400 mg, Oral, Daily  melatonin, 5 mg, Oral, Nightly  potassium chloride, 20 mEq, Oral, Daily  sertraline, 100 mg, Oral, Daily    Infusions   Diet  Diet: Cardiac; Healthy Heart (2-3 Na+); Fluid Consistency: Thin (IDDSI 0)    I have personally reviewed:  [x]  Laboratory   [x]  Microbiology   [x]  Radiology   [x]  EKG/Telemetry  [x]  Cardiology/Vascular   []  Pathology    []  Records       Assessment/Plan     Active Hospital Problems    Diagnosis  POA    **Acute renal failure [N17.9]  Yes    Arthritis [M19.90]  Yes    COPD (chronic obstructive pulmonary disease) [J44.9]  Yes    Hypomagnesemia [E83.42]  Yes    Anemia [D64.9]  Yes    Hypertension [I10]  Yes      Resolved Hospital Problems   No resolved problems to display.       89-year-old female with history of CKD, HTN, COPD, and chronic arthritic pain presented with generalized weakness and electrolyte disturbances. She was recently hospitalized for PHUONG and hyponatremia, with several medication changes including stopping Celebrex and Lasix. She " was restarted on Celebrex by her PCP, and now presents with recurrent electrolyte derangements and low magnesium possibly related to chronic PPI use.     Acute renal failure  -Creatinine improved from 1.47 to 1.07. Baseline SCr of ~0.8.  -Nephrology recommends no diuretics at discharge and close outpatient follow-up.  Okay to continue taking Celebrex.    Hypomagnesemia / Hypokalemia  -Magnesium critically low at 1.1 on admission, now 1.4-1.6 with repletion.  -History of recurrent hypomagnesemia; chronic PPI use likely contributing.  -Omeprazole discontinued; Pepcid started for gastric protection while on Celebrex.  -Potassium initially 3.1, now on replacement protocol.  -Monitor daily and continue repletion, especially in context of ongoing magnesium loss.  -Continue replacement protocols.  -Plan hold diuretics at discharge.  Discussed with nephrology.    Anemia  -Hemoglobin declined from 9.5 to 8.2, possibly related to hydration or chronic disease.  -No signs of active bleeding.  -Will monitor CBC daily and reassess if significant drop occurs.      Hypertension  -Stable during hospitalization.  -Will continue home amlodipine     Arthritis  -Pain improved on Celebrex, which was restarted by PCP prior to admission.  -Will continue Celebrex with nephrology approval.  -Aim to maintain renal and electrolyte balance to safely support continued use.      SCDs for DVT prophylaxis.  Full code.  Discussed with patient and consulting provider.  Anticipate discharge home tomorrow.  Expected Discharge Date: 7/31/2025; Expected Discharge Time:       Teddy Matthews MD  [facility]  07/30/25  17:38 EDT

## 2025-07-30 NOTE — PLAN OF CARE
Goal Outcome Evaluation:              Outcome Evaluation: Pt is AOx3, forgets situation. Pt is on RA. Pt is assist x1 to the bsc.

## 2025-07-30 NOTE — PROGRESS NOTES
"RENAL/KCC:     LOS: 0 days    Patient Care Team:  Heena Phan APRN as PCP - General (Nurse Practitioner)    Chief Complaint:  PHUONG on CKD    Subjective     Interval History:   Chart reviewed  No new complaints    Objective     Vital Sign Min/Max for last 24 hours  Temp  Min: 97 °F (36.1 °C)  Max: 98 °F (36.7 °C)   BP  Min: 129/71  Max: 178/67   Pulse  Min: 52  Max: 55   Resp  Min: 16  Max: 18   SpO2  Min: 91 %  Max: 98 %   No data recorded   No data recorded     Flowsheet Rows      Flowsheet Row First Filed Value   Admission Height 157.5 cm (62\") Documented at 07/28/2025 1807   Admission Weight 65.8 kg (145 lb) Documented at 07/28/2025 1807            I/O this shift:  In: 120 [P.O.:120]  Out: -   I/O last 3 completed shifts:  In: 550 [I.V.:50; IV Piggyback:500]  Out: -     Physical Exam:  GEN: Awake, NAD  ENT: PERRL, EOMI, MMM  NECK: Supple, no JVD  CHEST: CTAB, no W/R/C  CV: RRR, no M/G/R  ABD: Soft, NT, +BS  SKIN: Warm and Dry  NEURO: CN's intact      WBC WBC   Date Value Ref Range Status   07/30/2025 4.95 3.40 - 10.80 10*3/mm3 Final   07/28/2025 6.45 3.40 - 10.80 10*3/mm3 Final   07/28/2025 5.81 3.40 - 10.80 10*3/mm3 Final      HGB Hemoglobin   Date Value Ref Range Status   07/30/2025 8.2 (L) 12.0 - 15.9 g/dL Final   07/28/2025 9.5 (L) 12.0 - 15.9 g/dL Final   07/28/2025 9.3 (L) 12.0 - 15.9 g/dL Final      HCT Hematocrit   Date Value Ref Range Status   07/30/2025 26.8 (L) 34.0 - 46.6 % Final   07/28/2025 29.1 (L) 34.0 - 46.6 % Final   07/28/2025 30.7 (L) 34.0 - 46.6 % Final      Platlets No results found for: \"LABPLAT\"   MCV MCV   Date Value Ref Range Status   07/30/2025 88.4 79.0 - 97.0 fL Final   07/28/2025 86.4 79.0 - 97.0 fL Final   07/28/2025 88.0 79.0 - 97.0 fL Final          Sodium Sodium   Date Value Ref Range Status   07/30/2025 140 136 - 145 mmol/L Final   07/29/2025 136 136 - 145 mmol/L Final   07/29/2025 140 136 - 145 mmol/L Final   07/28/2025 140 136 - 145 mmol/L Final   07/28/2025 135 (L) " "136 - 145 mmol/L Final      Potassium Potassium   Date Value Ref Range Status   07/30/2025 3.3 (L) 3.5 - 5.2 mmol/L Final   07/29/2025 3.5 3.5 - 5.2 mmol/L Final   07/29/2025 3.5 3.5 - 5.2 mmol/L Final   07/29/2025 3.1 (L) 3.5 - 5.2 mmol/L Final   07/28/2025 3.3 (L) 3.5 - 5.2 mmol/L Final   07/28/2025 3.0 (L) 3.5 - 5.2 mmol/L Final      Chloride Chloride   Date Value Ref Range Status   07/30/2025 104 98 - 107 mmol/L Final   07/29/2025 100 98 - 107 mmol/L Final   07/29/2025 103 98 - 107 mmol/L Final   07/28/2025 102 98 - 107 mmol/L Final   07/28/2025 97 (L) 98 - 107 mmol/L Final      CO2 CO2   Date Value Ref Range Status   07/30/2025 24.0 22.0 - 29.0 mmol/L Final   07/29/2025 23.9 22.0 - 29.0 mmol/L Final   07/29/2025 23.6 22.0 - 29.0 mmol/L Final   07/28/2025 23.2 22.0 - 29.0 mmol/L Final   07/28/2025 20.4 (L) 22.0 - 29.0 mmol/L Final      BUN BUN   Date Value Ref Range Status   07/30/2025 27.0 (H) 8.0 - 23.0 mg/dL Final   07/29/2025 26.0 (H) 8.0 - 23.0 mg/dL Final   07/29/2025 27.0 (H) 8.0 - 23.0 mg/dL Final   07/28/2025 29.0 (H) 8.0 - 23.0 mg/dL Final   07/28/2025 32.0 (H) 8.0 - 23.0 mg/dL Final      Creatinine Creatinine   Date Value Ref Range Status   07/30/2025 1.07 (H) 0.57 - 1.00 mg/dL Final   07/29/2025 1.05 (H) 0.57 - 1.00 mg/dL Final   07/29/2025 1.06 (H) 0.57 - 1.00 mg/dL Final   07/28/2025 1.17 (H) 0.57 - 1.00 mg/dL Final   07/28/2025 1.47 (H) 0.57 - 1.00 mg/dL Final      Calcium Calcium   Date Value Ref Range Status   07/30/2025 8.8 8.2 - 9.6 mg/dL Final   07/29/2025 9.1 8.2 - 9.6 mg/dL Final   07/29/2025 8.6 8.2 - 9.6 mg/dL Final   07/28/2025 8.7 8.2 - 9.6 mg/dL Final   07/28/2025 8.9 8.2 - 9.6 mg/dL Final      PO4 No results found for: \"CAPO4\"   Albumin Albumin   Date Value Ref Range Status   07/30/2025 2.9 (L) 3.5 - 5.2 g/dL Final   07/29/2025 3.4 (L) 3.5 - 5.2 g/dL Final   07/29/2025 3.1 (L) 3.5 - 5.2 g/dL Final   07/28/2025 3.3 (L) 3.5 - 5.2 g/dL Final   07/28/2025 3.3 (L) 3.5 - 5.2 g/dL Final    " "  Magnesium Magnesium   Date Value Ref Range Status   07/30/2025 1.4 (L) 1.6 - 2.4 mg/dL Final   07/29/2025 1.6 1.6 - 2.4 mg/dL Final   07/28/2025 1.1 (L) 1.6 - 2.4 mg/dL Final   07/28/2025 1.1 (L) 1.6 - 2.4 mg/dL Final      Uric Acid No results found for: \"URICACID\"        Results Review:     I reviewed the patient's new clinical results.    amLODIPine, 2.5 mg, Oral, Daily  atorvastatin, 10 mg, Oral, Nightly  bisoprolol, 5 mg, Oral, Daily  budesonide-formoterol, 2 puff, Inhalation, BID - RT  Carboxymethylcellul-Glycerin, 1 drop, Both Eyes, BID  celecoxib, 200 mg, Oral, Daily  famotidine, 20 mg, Oral, BID AC  levothyroxine, 125 mcg, Oral, Q AM  melatonin, 5 mg, Oral, Nightly  sertraline, 100 mg, Oral, Daily           Medication Review: Reviewed    Assessment & Plan       Acute renal failure    Hyponatremia    Hypomagnesemia    Hypertension    Anemia    COPD (chronic obstructive pulmonary disease)    Arthritis    Hypokalemia    Hypomagnesemia    Plan: Cr stable at 1 which is just above baseline.  Volume OK.  Keep off diuretics.  Add daily K and Mag.  OK to continue Celebrex.  Will follow.  Dispo per primary.        Caden Hahn MD  [facility]  07/30/25  06:54 EDT      "

## 2025-07-30 NOTE — PLAN OF CARE
Goal Outcome Evaluation:      Patient is a 89 y.o. female admitted to [facility] on 7/28/2025 for acute renal failure and B LE pain. Patient is ambulator with a rollator at baseline and lives at home with family where they assist as needed. Today, patient performed bed mobility with SBA, required CGA for transfers, and ambulated 25' Maryjane with a RW. Strength and endurance deficits noted. Patient may benefit from skilled PT services acutely to address functional deficits as well as improve level of independence prior to discharge. Anticipate returning back home with family assistance and possible  PT upon DC. Encourage UIC for all meals and ambulation 3x/day to promote functional mobility during hospitalization.      Anticipated Discharge Disposition (PT): home with assist, home with home health

## 2025-07-31 LAB
ALBUMIN SERPL-MCNC: 3 G/DL (ref 3.5–5.2)
ANION GAP SERPL CALCULATED.3IONS-SCNC: 10.4 MMOL/L (ref 5–15)
BUN SERPL-MCNC: 27 MG/DL (ref 8–23)
BUN/CREAT SERPL: 22.5 (ref 7–25)
CALCIUM SPEC-SCNC: 8.8 MG/DL (ref 8.2–9.6)
CHLORIDE SERPL-SCNC: 103 MMOL/L (ref 98–107)
CO2 SERPL-SCNC: 23.6 MMOL/L (ref 22–29)
CREAT SERPL-MCNC: 1.2 MG/DL (ref 0.57–1)
DEPRECATED RDW RBC AUTO: 45.2 FL (ref 37–54)
EGFRCR SERPLBLD CKD-EPI 2021: 43.1 ML/MIN/1.73
ERYTHROCYTE [DISTWIDTH] IN BLOOD BY AUTOMATED COUNT: 14.1 % (ref 12.3–15.4)
GLUCOSE SERPL-MCNC: 86 MG/DL (ref 65–99)
HCT VFR BLD AUTO: 27.8 % (ref 34–46.6)
HGB BLD-MCNC: 8.8 G/DL (ref 12–15.9)
MAGNESIUM SERPL-MCNC: 1.4 MG/DL (ref 1.6–2.4)
MCH RBC QN AUTO: 27.9 PG (ref 26.6–33)
MCHC RBC AUTO-ENTMCNC: 31.7 G/DL (ref 31.5–35.7)
MCV RBC AUTO: 88.3 FL (ref 79–97)
PHOSPHATE SERPL-MCNC: 3.8 MG/DL (ref 2.5–4.5)
PLATELET # BLD AUTO: 245 10*3/MM3 (ref 140–450)
PMV BLD AUTO: 9.7 FL (ref 6–12)
POTASSIUM SERPL-SCNC: 3.5 MMOL/L (ref 3.5–5.2)
RBC # BLD AUTO: 3.15 10*6/MM3 (ref 3.77–5.28)
SODIUM SERPL-SCNC: 137 MMOL/L (ref 136–145)
WBC NRBC COR # BLD AUTO: 6.13 10*3/MM3 (ref 3.4–10.8)

## 2025-07-31 PROCEDURE — 85027 COMPLETE CBC AUTOMATED: CPT | Performed by: HOSPITALIST

## 2025-07-31 PROCEDURE — 80069 RENAL FUNCTION PANEL: CPT | Performed by: HOSPITALIST

## 2025-07-31 PROCEDURE — 83735 ASSAY OF MAGNESIUM: CPT | Performed by: INTERNAL MEDICINE

## 2025-07-31 PROCEDURE — 97530 THERAPEUTIC ACTIVITIES: CPT

## 2025-07-31 PROCEDURE — G0378 HOSPITAL OBSERVATION PER HR: HCPCS

## 2025-07-31 PROCEDURE — 97535 SELF CARE MNGMENT TRAINING: CPT

## 2025-07-31 RX ORDER — POTASSIUM CHLORIDE 1500 MG/1
20 TABLET, EXTENDED RELEASE ORAL 2 TIMES DAILY WITH MEALS
Status: DISCONTINUED | OUTPATIENT
Start: 2025-07-31 | End: 2025-08-02

## 2025-07-31 RX ORDER — HYDROCODONE BITARTRATE AND ACETAMINOPHEN 5; 325 MG/1; MG/1
1 TABLET ORAL EVERY 8 HOURS PRN
Refills: 0 | Status: COMPLETED | OUTPATIENT
Start: 2025-07-31 | End: 2025-08-01

## 2025-07-31 RX ADMIN — LEVOTHYROXINE SODIUM 125 MCG: 125 TABLET ORAL at 06:59

## 2025-07-31 RX ADMIN — BISOPROLOL FUMARATE 5 MG: 5 TABLET ORAL at 08:19

## 2025-07-31 RX ADMIN — AMLODIPINE BESYLATE 2.5 MG: 2.5 TABLET ORAL at 08:20

## 2025-07-31 RX ADMIN — Medication 5 MG: at 20:56

## 2025-07-31 RX ADMIN — HYDROCODONE BITARTRATE AND ACETAMINOPHEN 1 TABLET: 5; 325 TABLET ORAL at 00:02

## 2025-07-31 RX ADMIN — POTASSIUM CHLORIDE 20 MEQ: 1500 TABLET, EXTENDED RELEASE ORAL at 08:22

## 2025-07-31 RX ADMIN — HYDROCODONE BITARTRATE AND ACETAMINOPHEN 1 TABLET: 5; 325 TABLET ORAL at 14:57

## 2025-07-31 RX ADMIN — MAGNESIUM OXIDE TAB 400 MG (241.3 MG ELEMENTAL MG) 400 MG: 400 (241.3 MG) TAB at 08:22

## 2025-07-31 RX ADMIN — POTASSIUM CHLORIDE 20 MEQ: 1500 TABLET, EXTENDED RELEASE ORAL at 17:35

## 2025-07-31 RX ADMIN — MICONAZOLE NITRATE 1 APPLICATION: 2 POWDER TOPICAL at 20:56

## 2025-07-31 RX ADMIN — FAMOTIDINE 20 MG: 20 TABLET, FILM COATED ORAL at 06:59

## 2025-07-31 RX ADMIN — SERTRALINE HYDROCHLORIDE 100 MG: 100 TABLET, FILM COATED ORAL at 08:20

## 2025-07-31 RX ADMIN — ATORVASTATIN CALCIUM 10 MG: 20 TABLET, FILM COATED ORAL at 20:56

## 2025-07-31 RX ADMIN — MAGNESIUM OXIDE TAB 400 MG (241.3 MG ELEMENTAL MG) 400 MG: 400 (241.3 MG) TAB at 20:56

## 2025-07-31 RX ADMIN — FAMOTIDINE 20 MG: 20 TABLET, FILM COATED ORAL at 17:35

## 2025-07-31 NOTE — THERAPY TREATMENT NOTE
Patient Name: Candice Walker  : 1935    MRN: [id number]                              Today's Date: 2025       Admit Date: 2025    Visit Dx:     ICD-10-CM ICD-9-CM   1. Acute renal failure, unspecified acute renal failure type  N17.9 584.9   2. Hyponatremia  E87.1 276.1   3. Hypomagnesemia  E83.42 275.2   4. Hypokalemia  E87.6 276.8     Patient Active Problem List   Diagnosis    S/p reverse total shoulder arthroplasty    Acute UTI (urinary tract infection)    PHUONG (acute kidney injury)    Hypomagnesemia    Hypertension    Anemia    COPD (chronic obstructive pulmonary disease)    Alcohol use    DDD (degenerative disc disease), cervical    Acute renal failure    Arthritis     Past Medical History:   Diagnosis Date    Anemia     Anxiety     Arthritis     Cancer 2006    BREAST CANCER, RIGHT MASTECTOMY    Chronic bronchitis     Depression     Disease of thyroid gland     GERD (gastroesophageal reflux disease)     Hard of hearing     Heart murmur     History of kidney stones     History of UTI 2017    HAD ABX    Hyperlipidemia     Hypertension     MRSA (methicillin resistant Staphylococcus aureus) infection s    EARLY , HERE AT [facility], LEFT KNEE  , D/W GISELLE IN INFECTION CONTROL    RBBB     Risk factors for obstructive sleep apnea     Shoulder pain     LEFT     Past Surgical History:   Procedure Laterality Date    APPENDECTOMY      CATARACT EXTRACTION      WITH LENS IMPLANT    CHOLECYSTECTOMY      CYST REMOVAL      ON SPINE    EYE SURGERY      HYSTERECTOMY      JOINT REPLACEMENT      LT KNEE    MASTECTOMY Right 2006    MASTECTOMY RADICAL WITH AXILLARY NODE DISSECTION Right     THYROIDECTOMY      TOTAL SHOULDER ARTHROPLASTY W/ DISTAL CLAVICLE EXCISION Left 2017    Procedure: LT TOTAL SHOULDER REVERSE ARTHROPLASTY;  Surgeon: Ezequiel Elizalde MD;  Location: [facility] OR [facility];  Service:       General Information       Row Name 25 1612          OT Time and Intention    Subjective Information  complains of;pain  being cold  -KR     Document Type therapy note (daily note)  -KR     Mode of Treatment individual therapy;occupational therapy  -KR     Patient Effort good  -KR       Row Name 07/31/25 1612          General Information    Patient Profile Reviewed yes  -KR     Existing Precautions/Restrictions fall  -KR       Row Name 07/31/25 1612          Cognition    Orientation Status (Cognition) oriented x 3  -KR       Row Name 07/31/25 1612          Safety Issues/Impairments Affecting Functional Mobility    Safety Issues Affecting Function (Mobility) ability to follow commands;awareness of need for assistance;insight into deficits/self-awareness;judgment  -KR     Impairments Affecting Function (Mobility) balance;endurance/activity tolerance;strength;pain;cognition  -KR     Cognitive Impairments, Mobility Safety/Performance attention;awareness, need for assistance;insight into deficits/self-awareness;problem-solving/reasoning;safety precaution follow-through  -KR     Comment, Safety Issues/Impairments (Mobility) Pt frustrated and no happy upon entry, expressing she wishses she would have just gone home, and overall frustrated with her stituation. Pt provided with therapeutic listening and helped to improve comfort and address needs. Pt req encouragment and redirection to promote participation. Gait belt and non-skid socks donned  -KR               User Key  (r) = Recorded By, (t) = Taken By, (c) = Cosigned By      Initials Name Provider Type    KR Ted Montez OT Occupational Therapist                     Mobility/ADL's       Row Name 07/31/25 1614          Bed Mobility    Bed Mobility sit-supine  -KR     Sit-Supine McKean (Bed Mobility) minimum assist (75% patient effort);verbal cues  -KR     Comment, (Bed Mobility) pt req A for getting BLE back into bed  -KR       Row Name 07/31/25 1614          Transfers    Transfers sit-stand transfer;toilet transfer  -KR       Row Name 07/31/25 1614           Sit-Stand Transfer    Sit-Stand Loving (Transfers) verbal cues;nonverbal cues (demo/gesture);minimum assist (75% patient effort);moderate assist (50% patient effort)  -KR     Assistive Device (Sit-Stand Transfers) walker, front-wheeled  -KR     Comment, (Sit-Stand Transfer) pt req min-mod A for STS from chair and toilet  -KR       Row Name 07/31/25 1614          Toilet Transfer    Type (Toilet Transfer) sit-stand;stand-sit  -KR     Loving Level (Toilet Transfer) minimum assist (75% patient effort);verbal cues  -KR     Assistive Device (Toilet Transfer) grab bars/safety frame  -KR       Row Name 07/31/25 1614          Functional Mobility    Functional Mobility- Ind. Level 1 person;minimum assist (75% patient effort)  -KR     Functional Mobility- Device walker, front-wheeled  -KR     Functional Mobility- Comment pt unsteady and req min A for functional mobility with RW to bathroom, then back to EOB. Pt with some partial knee buckling and expressing inc pain. Pt req cues for safety and encouragement.  -KR     Patient was able to Ambulate yes  -KR       Row Name 07/31/25 1614          Activities of Daily Living    BADL Assessment/Intervention toileting;grooming  -KR       Row Name 07/31/25 1614          Toileting Assessment/Training    Loving Level (Toileting) moderate assist (50% patient effort);perform perineal hygiene  -KR     Assistive Devices (Toileting) grab bar/safety frame  -KR     Comment, (Toileting) mod A for pericare, clothing managment, and technique  -KR       Row Name 07/31/25 1614          Grooming Assessment/Training    Comment, (Grooming) pt req mod A for brushing hair. Pt able to use RUE to hold brush but unable to maintain grasp and LUE should unable to reach up 2/2 pain/stiffness  -KR               User Key  (r) = Recorded By, (t) = Taken By, (c) = Cosigned By      Initials Name Provider Type    Ted Duron OT Occupational Therapist                   Obj/Interventions        Row Name 07/31/25 1618          Range of Motion Comprehensive    Comment, General Range of Motion R shoulder limited ~15d fl AROM; AAROM ~90d; Pt with noted B hand ROM deficits 2/2 arthritis  -KR       Row Name 07/31/25 1618          Balance    Static Sitting Balance contact guard  -KR     Dynamic Sitting Balance contact guard  -KR     Static Standing Balance contact guard  -KR     Dynamic Standing Balance minimal assist;moderate assist  -KR               User Key  (r) = Recorded By, (t) = Taken By, (c) = Cosigned By      Initials Name Provider Type    Ted Duron OT Occupational Therapist                   Goals/Plan    No documentation.                  Clinical Impression       Row Name 07/31/25 1619          Pain Assessment    Pre/Posttreatment Pain Comment 'the max amount of pain'- pt indicating everywhere but unable to get clarification  -SRIDEVI       Row Name 07/31/25 1619          Plan of Care Review    Plan of Care Reviewed With patient  -KR     Outcome Evaluation Upon entry pt up in chair and expressing she needs a brief and she is freezing. Pt agreeable to getting back to bed but then expressing need for toileting. Pt able to complete functional mobilty in room with Min A and RW, pt unsteady and with parital knee buckling. Pt req mod A for toileting and max A for brief managment. Pt overall frustrated and wishes to go home, and becoming upset about her pain. Pt was able to walk back to EOB and return to supine with min A. Pt with impaired endurance and heavily breathing after short boughts of mobility. Pt limited with RA flair in B hands and unable to complete grooming task with min-mod A. Pt noted with rash under L breast and RN informed. End of session pt appeared to be more agreeable and comforatable. Pt demonstrating impaired endurance, weakness, and balance deficits impacting ADL IND. Pt with limited help at home and at this time would benefit from d/c rehab due to deficits noted.  -SRIDEVI Lutz  Name 07/31/25 1619          Therapy Assessment/Plan (OT)    Rehab Potential (OT) good  -KR     Criteria for Skilled Therapeutic Interventions Met (OT) yes  -KR     Therapy Frequency (OT) 5 times/wk  -KR       Row Name 07/31/25 1619          Therapy Plan Review/Discharge Plan (OT)    Anticipated Discharge Disposition (OT) sub acute care setting;skilled nursing facility  -KR       Row Name 07/31/25 1619          Positioning and Restraints    Pre-Treatment Position sitting in chair/recliner  -KR     Post Treatment Position bed  -KR     In Bed notified nsg;call light within reach;encouraged to call for assist;exit alarm on;fowlers;legs elevated  -KR               User Key  (r) = Recorded By, (t) = Taken By, (c) = Cosigned By      Initials Name Provider Type    Ted Duron, ATUL Occupational Therapist                   Outcome Measures       Row Name 07/31/25 1625          How much help from another is currently needed...    Putting on and taking off regular lower body clothing? 2  -KR     Bathing (including washing, rinsing, and drying) 2  -KR     Toileting (which includes using toilet bed pan or urinal) 2  -KR     Putting on and taking off regular upper body clothing 3  -KR     Taking care of personal grooming (such as brushing teeth) 2  -KR     Eating meals 3  -KR     AM-PAC 6 Clicks Score (OT) 14  -KR       Row Name 07/31/25 0820          How much help from another person do you currently need...    Turning from your back to your side while in flat bed without using bedrails? 3  -KM     Moving from lying on back to sitting on the side of a flat bed without bedrails? 3  -KM     Moving to and from a bed to a chair (including a wheelchair)? 3  -KM     Standing up from a chair using your arms (e.g., wheelchair, bedside chair)? 3  -KM     Climbing 3-5 steps with a railing? 2  -KM     To walk in hospital room? 3  -KM     AM-PAC 6 Clicks Score (PT) 17  -KM       Row Name 07/31/25 1625          Functional Assessment     Outcome Measure Options AM-PAC 6 Clicks Daily Activity (OT)  -KR               User Key  (r) = Recorded By, (t) = Taken By, (c) = Cosigned By      Initials Name Provider Type    Ammy Astudillo, RN Registered Nurse    Ted Duron, OT Occupational Therapist                      OT Recommendation and Plan  Therapy Frequency (OT): 5 times/wk  Plan of Care Review  Plan of Care Reviewed With: patient  Outcome Evaluation: Upon entry pt up in chair and expressing she needs a brief and she is freezing. Pt agreeable to getting back to bed but then expressing need for toileting. Pt able to complete functional mobilty in room with Min A and RW, pt unsteady and with parital knee buckling. Pt req mod A for toileting and max A for brief managment. Pt overall frustrated and wishes to go home, and becoming upset about her pain. Pt was able to walk back to EOB and return to supine with min A. Pt with impaired endurance and heavily breathing after short boughts of mobility. Pt limited with RA flair in B hands and unable to complete grooming task with min-mod A. Pt noted with rash under L breast and RN informed. End of session pt appeared to be more agreeable and comforatable. Pt demonstrating impaired endurance, weakness, and balance deficits impacting ADL IND. Pt with limited help at home and at this time would benefit from d/c rehab due to deficits noted.     Time Calculation:         Time Calculation- OT       Row Name 07/31/25 1626             Time Calculation- OT    OT Start Time 1340  -KR      OT Stop Time 1411  -KR      OT Time Calculation (min) 31 min  -KR      Total Timed Code Minutes- OT 31 minute(s)  -KR         Timed Charges    65580 - OT Therapeutic Activity Minutes 12  -KR      05579 - OT Self Care/Mgmt Minutes 19  -KR         Total Minutes    Timed Charges Total Minutes 31  -KR       Total Minutes 31  -KR                User Key  (r) = Recorded By, (t) = Taken By, (c) = Cosigned By      Initials Name Provider  Type    KR Ted Montez OT Occupational Therapist                  Therapy Charges for Today       Code Description Service Date Service Provider Modifiers Qty    18460839702 HC OT THERAPEUTIC ACT EA 15 MIN 7/31/2025 Ted Montez OT GO 1    80314792452 HC OT SELF CARE/MGMT/TRAIN EA 15 MIN 7/31/2025 Ted Montez OT GO 1                 Ted Montez OT  7/31/2025

## 2025-07-31 NOTE — PLAN OF CARE
Goal Outcome Evaluation:  Plan of Care Reviewed With: patient           Outcome Evaluation: Upon entry pt up in chair and expressing she needs a brief and she is freezing. Pt agreeable to getting back to bed but then expressing need for toileting. Pt able to complete functional mobilty in room with Min A and RW, pt unsteady and with parital knee buckling. Pt req mod A for toileting and max A for brief managment. Pt overall frustrated and wishes to go home, and becoming upset about her pain. Pt was able to walk back to EOB and return to supine with min A. Pt with impaired endurance and heavily breathing after short boughts of mobility. Pt limited with RA flair in B hands and unable to complete grooming task with min-mod A. Pt noted with rash under L breast and RN informed. End of session pt appeared to be more agreeable and comforatable. Pt demonstrating impaired endurance, weakness, and balance deficits impacting ADL IND. Pt with limited help at home and at this time would benefit from d/c rehab due to deficits noted.    Anticipated Discharge Disposition (OT): sub acute care setting, skilled nursing facility

## 2025-07-31 NOTE — PROGRESS NOTES
"RENAL/KCC:     LOS: 0 days    Patient Care Team:  Heena Phan APRN as PCP - General (Nurse Practitioner)    Chief Complaint:  PHUONG on CKD    Subjective     Interval History:   Chart reviewed  Patient sleepy this AM  Breakfast tray at bedside  Having difficulty eating due to arthritis and hand deformities    Objective     Vital Sign Min/Max for last 24 hours  Temp  Min: 97.5 °F (36.4 °C)  Max: 98 °F (36.7 °C)   BP  Min: 148/74  Max: 174/71   Pulse  Min: 48  Max: 59   Resp  Min: 18  Max: 18   SpO2  Min: 96 %  Max: 97 %   No data recorded   No data recorded     Flowsheet Rows      Flowsheet Row First Filed Value   Admission Height 157.5 cm (62\") Documented at 07/28/2025 1807   Admission Weight 65.8 kg (145 lb) Documented at 07/28/2025 1807            I/O this shift:  In: 360 [P.O.:360]  Out: -   I/O last 3 completed shifts:  In: 120 [P.O.:120]  Out: -     Physical Exam:  GEN: Awake, NAD  ENT: PERRL, EOMI, MMM  NECK: Supple, no JVD  CHEST: CTAB, no W/R/C  CV: RRR, no M/G/R  ABD: Soft, NT, +BS  SKIN: Warm and Dry  NEURO: CN's intact      WBC WBC   Date Value Ref Range Status   07/31/2025 6.13 3.40 - 10.80 10*3/mm3 Final   07/30/2025 4.95 3.40 - 10.80 10*3/mm3 Final   07/28/2025 6.45 3.40 - 10.80 10*3/mm3 Final   07/28/2025 5.81 3.40 - 10.80 10*3/mm3 Final      HGB Hemoglobin   Date Value Ref Range Status   07/31/2025 8.8 (L) 12.0 - 15.9 g/dL Final   07/30/2025 8.2 (L) 12.0 - 15.9 g/dL Final   07/28/2025 9.5 (L) 12.0 - 15.9 g/dL Final   07/28/2025 9.3 (L) 12.0 - 15.9 g/dL Final      HCT Hematocrit   Date Value Ref Range Status   07/31/2025 27.8 (L) 34.0 - 46.6 % Final   07/30/2025 26.8 (L) 34.0 - 46.6 % Final   07/28/2025 29.1 (L) 34.0 - 46.6 % Final   07/28/2025 30.7 (L) 34.0 - 46.6 % Final      Platlets No results found for: \"LABPLAT\"   MCV MCV   Date Value Ref Range Status   07/31/2025 88.3 79.0 - 97.0 fL Final   07/30/2025 88.4 79.0 - 97.0 fL Final   07/28/2025 86.4 79.0 - 97.0 fL Final   07/28/2025 88.0 79.0 " "- 97.0 fL Final          Sodium Sodium   Date Value Ref Range Status   07/30/2025 140 136 - 145 mmol/L Final   07/29/2025 136 136 - 145 mmol/L Final   07/29/2025 140 136 - 145 mmol/L Final   07/28/2025 140 136 - 145 mmol/L Final   07/28/2025 135 (L) 136 - 145 mmol/L Final      Potassium Potassium   Date Value Ref Range Status   07/30/2025 3.3 (L) 3.5 - 5.2 mmol/L Final   07/29/2025 3.5 3.5 - 5.2 mmol/L Final   07/29/2025 3.5 3.5 - 5.2 mmol/L Final   07/29/2025 3.1 (L) 3.5 - 5.2 mmol/L Final   07/28/2025 3.3 (L) 3.5 - 5.2 mmol/L Final   07/28/2025 3.0 (L) 3.5 - 5.2 mmol/L Final      Chloride Chloride   Date Value Ref Range Status   07/30/2025 104 98 - 107 mmol/L Final   07/29/2025 100 98 - 107 mmol/L Final   07/29/2025 103 98 - 107 mmol/L Final   07/28/2025 102 98 - 107 mmol/L Final   07/28/2025 97 (L) 98 - 107 mmol/L Final      CO2 CO2   Date Value Ref Range Status   07/30/2025 24.0 22.0 - 29.0 mmol/L Final   07/29/2025 23.9 22.0 - 29.0 mmol/L Final   07/29/2025 23.6 22.0 - 29.0 mmol/L Final   07/28/2025 23.2 22.0 - 29.0 mmol/L Final   07/28/2025 20.4 (L) 22.0 - 29.0 mmol/L Final      BUN BUN   Date Value Ref Range Status   07/30/2025 27.0 (H) 8.0 - 23.0 mg/dL Final   07/29/2025 26.0 (H) 8.0 - 23.0 mg/dL Final   07/29/2025 27.0 (H) 8.0 - 23.0 mg/dL Final   07/28/2025 29.0 (H) 8.0 - 23.0 mg/dL Final   07/28/2025 32.0 (H) 8.0 - 23.0 mg/dL Final      Creatinine Creatinine   Date Value Ref Range Status   07/30/2025 1.07 (H) 0.57 - 1.00 mg/dL Final   07/29/2025 1.05 (H) 0.57 - 1.00 mg/dL Final   07/29/2025 1.06 (H) 0.57 - 1.00 mg/dL Final   07/28/2025 1.17 (H) 0.57 - 1.00 mg/dL Final   07/28/2025 1.47 (H) 0.57 - 1.00 mg/dL Final      Calcium Calcium   Date Value Ref Range Status   07/30/2025 8.8 8.2 - 9.6 mg/dL Final   07/29/2025 9.1 8.2 - 9.6 mg/dL Final   07/29/2025 8.6 8.2 - 9.6 mg/dL Final   07/28/2025 8.7 8.2 - 9.6 mg/dL Final   07/28/2025 8.9 8.2 - 9.6 mg/dL Final      PO4 No results found for: \"CAPO4\"   Albumin " "Albumin   Date Value Ref Range Status   07/30/2025 2.9 (L) 3.5 - 5.2 g/dL Final   07/29/2025 3.4 (L) 3.5 - 5.2 g/dL Final   07/29/2025 3.1 (L) 3.5 - 5.2 g/dL Final   07/28/2025 3.3 (L) 3.5 - 5.2 g/dL Final   07/28/2025 3.3 (L) 3.5 - 5.2 g/dL Final      Magnesium Magnesium   Date Value Ref Range Status   07/30/2025 1.4 (L) 1.6 - 2.4 mg/dL Final   07/29/2025 1.6 1.6 - 2.4 mg/dL Final   07/28/2025 1.1 (L) 1.6 - 2.4 mg/dL Final   07/28/2025 1.1 (L) 1.6 - 2.4 mg/dL Final      Uric Acid No results found for: \"URICACID\"        Results Review:     I reviewed the patient's new clinical results.    amLODIPine, 2.5 mg, Oral, Daily  atorvastatin, 10 mg, Oral, Nightly  bisoprolol, 5 mg, Oral, Daily  budesonide-formoterol, 2 puff, Inhalation, BID - RT  Carboxymethylcellul-Glycerin, 1 drop, Both Eyes, BID  celecoxib, 200 mg, Oral, Daily  famotidine, 20 mg, Oral, BID AC  levothyroxine, 125 mcg, Oral, Q AM  magnesium oxide, 400 mg, Oral, Daily  melatonin, 5 mg, Oral, Nightly  potassium chloride, 20 mEq, Oral, Daily  sertraline, 100 mg, Oral, Daily           Medication Review: Reviewed    Assessment & Plan       Acute renal failure    Hypomagnesemia    Hypertension    Anemia    COPD (chronic obstructive pulmonary disease)    Arthritis    Hypokalemia - nutritional    Hypomagnesemia - multifactorial: PPI, nutritional    Plan: Cr narrowly higher at 1.2.  Encourage PO as able (patient voices difficulty eating and drinking due to arthritis).  Unfortunately need to put Celebrex on hold given PHUONG.  Keep off diuretics.  Increase K and Mag to BID.          Caden Hahn MD  [facility]  07/31/25  06:54 EDT      "

## 2025-07-31 NOTE — PROGRESS NOTES
Name: Candice Walker ADMIT: 2025   : 1935  PCP: Heena Phan APRN    MRN: [id number] LOS: 0 days   AGE/SEX: 89 y.o. female  ROOM: [facility]     Subjective   Subjective   Still having a lot of arthritic pain.       Objective   Objective   Vital Signs  Temp:  [97.1 °F (36.2 °C)-97.7 °F (36.5 °C)] 97.3 °F (36.3 °C)  Heart Rate:  [54-63] 63  Resp:  [16-18] 16  BP: (130-178)/(72-89) 178/86  SpO2:  [91 %-98 %] 98 %  on   ;   Device (Oxygen Therapy): room air  Body mass index is 26.52 kg/m².  Physical Exam  Vitals and nursing note reviewed.   Constitutional:       General: She is not in acute distress.  Cardiovascular:      Rate and Rhythm: Normal rate and regular rhythm.   Pulmonary:      Effort: Pulmonary effort is normal.      Breath sounds: Normal breath sounds.   Abdominal:      General: Bowel sounds are normal.      Palpations: Abdomen is soft.      Tenderness: There is no abdominal tenderness.   Musculoskeletal:         General: No swelling.   Skin:     General: Skin is warm and dry.   Neurological:      Mental Status: She is alert. Mental status is at baseline.       Results Review     I reviewed the patient's new clinical results.  Results from last 7 days   Lab Units 25  0544 25  0527 25  2331 25  1818   WBC 10*3/mm3 6.13 4.95 6.45 5.81   HEMOGLOBIN g/dL 8.8* 8.2* 9.5* 9.3*   PLATELETS 10*3/mm3 245 233 267 261     Results from last 7 days   Lab Units 25  0544 25  0527 25  2324 25  1149 25  0334   SODIUM mmol/L 137 140  --  136 140   POTASSIUM mmol/L 3.5 3.3* 3.5 3.5 3.1*   CHLORIDE mmol/L 103 104  --  100 103   CO2 mmol/L 23.6 24.0  --  23.9 23.6   BUN mg/dL 27.0* 27.0*  --  26.0* 27.0*   CREATININE mg/dL 1.20* 1.07*  --  1.05* 1.06*   GLUCOSE mg/dL 86 84  --  121* 95   EGFR mL/min/1.73 43.1* 49.4*  --  50.6* 50.0*     Results from last 7 days   Lab Units 25  0544 25  0527 25  1149 25  0334 25  1931  "07/28/25  1818   ALBUMIN g/dL 3.0* 2.9* 3.4* 3.1* 3.3* 3.3*   BILIRUBIN mg/dL  --  0.3  --  0.3 0.3 0.4   ALK PHOS U/L  --  99  --  110 116 128*   AST (SGOT) U/L  --  9  --  14 14 15   ALT (SGPT) U/L  --  6  --  8 10 7     Results from last 7 days   Lab Units 07/31/25  0544 07/30/25  0527 07/29/25  1149 07/29/25  0334 07/28/25  2331   CALCIUM mg/dL 8.8 8.8 9.1 8.6 8.7   ALBUMIN g/dL 3.0* 2.9* 3.4* 3.1* 3.3*   MAGNESIUM mg/dL 1.4* 1.4*  --  1.6 1.1*   PHOSPHORUS mg/dL 3.8 3.6 3.8 3.8  --        No results found for: \"HGBA1C\", \"POCGLU\"    No radiology results for the last day    I have personally reviewed all medications:  Scheduled Medications  amLODIPine, 2.5 mg, Oral, Daily  atorvastatin, 10 mg, Oral, Nightly  bisoprolol, 5 mg, Oral, Daily  budesonide-formoterol, 2 puff, Inhalation, BID - RT  Carboxymethylcellul-Glycerin, 1 drop, Both Eyes, BID  [Held by provider] celecoxib, 200 mg, Oral, Daily  famotidine, 20 mg, Oral, BID AC  levothyroxine, 125 mcg, Oral, Q AM  magnesium oxide, 400 mg, Oral, BID  melatonin, 5 mg, Oral, Nightly  miconazole, 1 Application, Topical, Q12H  potassium chloride, 20 mEq, Oral, BID With Meals  sertraline, 100 mg, Oral, Daily    Infusions   Diet  Diet: Cardiac; Healthy Heart (2-3 Na+); Fluid Consistency: Thin (IDDSI 0)    I have personally reviewed:  [x]  Laboratory   [x]  Microbiology   [x]  Radiology   [x]  EKG/Telemetry  [x]  Cardiology/Vascular   []  Pathology    []  Records       Assessment/Plan     Active Hospital Problems    Diagnosis  POA    **Acute renal failure [N17.9]  Yes    Arthritis [M19.90]  Yes    COPD (chronic obstructive pulmonary disease) [J44.9]  Yes    Hypomagnesemia [E83.42]  Yes    Anemia [D64.9]  Yes    Hypertension [I10]  Yes      Resolved Hospital Problems   No resolved problems to display.       89-year-old female with history of CKD, HTN, COPD, and chronic arthritic pain presented with generalized weakness and electrolyte disturbances. She was recently " hospitalized for PHUONG and hyponatremia, with several medication changes including stopping Celebrex and Lasix. She was restarted on Celebrex by her PCP, and now presents with recurrent electrolyte derangements and low magnesium possibly related to chronic PPI use.     Acute renal failure  -Creatinine den to 1.47 during prior admission, now 1.2 from 1.07, baseline ~0.8.  -Nephrology recommends holding Celebrex and observing inpatient for at least one more day.  -Avoid diuretics at discharge and ensure close outpatient follow-up.    Hypomagnesemia / Hypokalemia  -Magnesium remains low at 1.4, improved from prior eunice of 1.1.  -History of recurrent hypomagnesemia likely related to chronic PPI use.  -Omeprazole discontinued and Pepcid initiated.  -Potassium now 3.5 on replacement protocol.  -Continue daily monitoring and replacement.  -Plan to reassess Celebrex safety based on electrolyte and renal stability.    Anemia  -Hemoglobin improved to 8.8 from 8.2, no overt bleeding.  -May relate to chronic disease or prior hydration status.  -Monitor CBC for stability.    Hypertension  -Blood pressure slightly elevated at 178/86 but stable overall.  -Continue home amlodipine.    Arthritis  -Pain improved on Celebrex, which was restarted prior to this admission.  -Nephrology recommends holding Celebrex short term for monitoring.  -Norco available for severe pain.  -Aim to reassess safety of continued use once renal and electrolyte parameters stabilize.    Skin irritation, likely yeast  -Erythema noted under breast.  -Miconazole powder ordered for treatment.  -Will monitor for resolution.       SCDs for DVT prophylaxis.  Full code.  Discussed with patient and consulting provider.  Anticipate discharge home tomorrow.  Expected Discharge Date: 8/1/2025; Expected Discharge Time:       Teddy Matthews MD  [facility]  07/31/25  14:44 EDT

## 2025-08-01 ENCOUNTER — APPOINTMENT (OUTPATIENT)
Dept: GENERAL RADIOLOGY | Facility: HOSPITAL | Age: OVER 89
End: 2025-08-01
Payer: MEDICARE

## 2025-08-01 LAB
ALBUMIN SERPL-MCNC: 2.7 G/DL (ref 3.5–5.2)
ANION GAP SERPL CALCULATED.3IONS-SCNC: 11.6 MMOL/L (ref 5–15)
BUN SERPL-MCNC: 23 MG/DL (ref 8–23)
BUN/CREAT SERPL: 25 (ref 7–25)
CALCIUM SPEC-SCNC: 8.8 MG/DL (ref 8.2–9.6)
CHLORIDE SERPL-SCNC: 102 MMOL/L (ref 98–107)
CO2 SERPL-SCNC: 23.4 MMOL/L (ref 22–29)
CREAT SERPL-MCNC: 0.92 MG/DL (ref 0.57–1)
DEPRECATED RDW RBC AUTO: 43.9 FL (ref 37–54)
EGFRCR SERPLBLD CKD-EPI 2021: 59.3 ML/MIN/1.73
ERYTHROCYTE [DISTWIDTH] IN BLOOD BY AUTOMATED COUNT: 13.7 % (ref 12.3–15.4)
GLUCOSE SERPL-MCNC: 80 MG/DL (ref 65–99)
HCT VFR BLD AUTO: 25.6 % (ref 34–46.6)
HGB BLD-MCNC: 7.8 G/DL (ref 12–15.9)
MAGNESIUM SERPL-MCNC: 1.6 MG/DL (ref 1.6–2.4)
MCH RBC QN AUTO: 26.7 PG (ref 26.6–33)
MCHC RBC AUTO-ENTMCNC: 30.5 G/DL (ref 31.5–35.7)
MCV RBC AUTO: 87.7 FL (ref 79–97)
PHOSPHATE SERPL-MCNC: 3 MG/DL (ref 2.5–4.5)
PLATELET # BLD AUTO: 238 10*3/MM3 (ref 140–450)
PMV BLD AUTO: 9.8 FL (ref 6–12)
POTASSIUM SERPL-SCNC: 4.2 MMOL/L (ref 3.5–5.2)
RBC # BLD AUTO: 2.92 10*6/MM3 (ref 3.77–5.28)
SODIUM SERPL-SCNC: 137 MMOL/L (ref 136–145)
WBC NRBC COR # BLD AUTO: 5.7 10*3/MM3 (ref 3.4–10.8)

## 2025-08-01 PROCEDURE — 97110 THERAPEUTIC EXERCISES: CPT

## 2025-08-01 PROCEDURE — G0378 HOSPITAL OBSERVATION PER HR: HCPCS

## 2025-08-01 PROCEDURE — 97535 SELF CARE MNGMENT TRAINING: CPT

## 2025-08-01 PROCEDURE — 73030 X-RAY EXAM OF SHOULDER: CPT

## 2025-08-01 PROCEDURE — 80069 RENAL FUNCTION PANEL: CPT | Performed by: HOSPITALIST

## 2025-08-01 PROCEDURE — 85027 COMPLETE CBC AUTOMATED: CPT | Performed by: HOSPITALIST

## 2025-08-01 PROCEDURE — 97530 THERAPEUTIC ACTIVITIES: CPT

## 2025-08-01 PROCEDURE — 83735 ASSAY OF MAGNESIUM: CPT | Performed by: INTERNAL MEDICINE

## 2025-08-01 RX ORDER — TRAMADOL HYDROCHLORIDE 50 MG/1
50 TABLET ORAL EVERY 6 HOURS PRN
Status: DISCONTINUED | OUTPATIENT
Start: 2025-08-01 | End: 2025-08-02 | Stop reason: HOSPADM

## 2025-08-01 RX ADMIN — SERTRALINE HYDROCHLORIDE 100 MG: 100 TABLET, FILM COATED ORAL at 08:34

## 2025-08-01 RX ADMIN — MAGNESIUM OXIDE TAB 400 MG (241.3 MG ELEMENTAL MG) 400 MG: 400 (241.3 MG) TAB at 20:27

## 2025-08-01 RX ADMIN — LEVOTHYROXINE SODIUM 125 MCG: 125 TABLET ORAL at 07:51

## 2025-08-01 RX ADMIN — HYDROCODONE BITARTRATE AND ACETAMINOPHEN 1 TABLET: 5; 325 TABLET ORAL at 08:34

## 2025-08-01 RX ADMIN — AMLODIPINE BESYLATE 2.5 MG: 2.5 TABLET ORAL at 08:34

## 2025-08-01 RX ADMIN — HYDROCODONE BITARTRATE AND ACETAMINOPHEN 1 TABLET: 5; 325 TABLET ORAL at 00:39

## 2025-08-01 RX ADMIN — POTASSIUM CHLORIDE 20 MEQ: 1500 TABLET, EXTENDED RELEASE ORAL at 08:35

## 2025-08-01 RX ADMIN — FAMOTIDINE 20 MG: 20 TABLET, FILM COATED ORAL at 17:39

## 2025-08-01 RX ADMIN — ATORVASTATIN CALCIUM 10 MG: 20 TABLET, FILM COATED ORAL at 20:27

## 2025-08-01 RX ADMIN — MICONAZOLE NITRATE 1 APPLICATION: 2 POWDER TOPICAL at 08:35

## 2025-08-01 RX ADMIN — MICONAZOLE NITRATE 1 APPLICATION: 2 POWDER TOPICAL at 20:27

## 2025-08-01 RX ADMIN — TRAMADOL HYDROCHLORIDE 50 MG: 50 TABLET, COATED ORAL at 20:34

## 2025-08-01 RX ADMIN — FAMOTIDINE 20 MG: 20 TABLET, FILM COATED ORAL at 07:51

## 2025-08-01 RX ADMIN — MAGNESIUM OXIDE TAB 400 MG (241.3 MG ELEMENTAL MG) 400 MG: 400 (241.3 MG) TAB at 08:34

## 2025-08-01 RX ADMIN — BISOPROLOL FUMARATE 5 MG: 5 TABLET ORAL at 08:34

## 2025-08-01 RX ADMIN — POTASSIUM CHLORIDE 20 MEQ: 1500 TABLET, EXTENDED RELEASE ORAL at 17:39

## 2025-08-01 RX ADMIN — POLYETHYLENE GLYCOL 3350 17 G: 17 POWDER, FOR SOLUTION ORAL at 08:34

## 2025-08-01 RX ADMIN — DOCUSATE SODIUM AND SENNOSIDES 2 TABLET: 8.6; 5 TABLET, FILM COATED ORAL at 08:34

## 2025-08-01 RX ADMIN — Medication 5 MG: at 20:27

## 2025-08-01 NOTE — PROGRESS NOTES
Patient Name: Candice Walker  YOB: 1935  MRN: [id number]  Admission date: 7/28/2025  Reason for Encounter: MST 2-3 or Nursing Admission Screen    [facility] Clinical Nutrition Assessment     Subjective    Subjective Information     8/1: Pt is a 89 y.o. female with a history of COPD, HTN and chronic hyponatremia presented with bilateral lower extremity tingling and numbness progressing to the arms. Appetite is good, po intakes %. No noted significant wt loss per wt hx. Possible d/c noted today.     Objective   H&P and Current Problems      H&P  Past Medical History:   Diagnosis Date    Anemia     Anxiety     Arthritis     Cancer 2006    BREAST CANCER, RIGHT MASTECTOMY    Chronic bronchitis     Depression     Disease of thyroid gland     GERD (gastroesophageal reflux disease)     Hard of hearing     Heart murmur     History of kidney stones     History of UTI 02/2017    HAD ABX    Hyperlipidemia     Hypertension     MRSA (methicillin resistant Staphylococcus aureus) infection 2000s    EARLY 2000's, HERE AT [facility], LEFT KNEE  , D/W GISELLE IN INFECTION CONTROL    RBBB     Risk factors for obstructive sleep apnea     Shoulder pain     LEFT      Past Surgical History:   Procedure Laterality Date    APPENDECTOMY      CATARACT EXTRACTION      WITH LENS IMPLANT    CHOLECYSTECTOMY      CYST REMOVAL      ON SPINE    EYE SURGERY      HYSTERECTOMY      JOINT REPLACEMENT      LT KNEE    MASTECTOMY Right 2006    MASTECTOMY RADICAL WITH AXILLARY NODE DISSECTION Right     THYROIDECTOMY      TOTAL SHOULDER ARTHROPLASTY W/ DISTAL CLAVICLE EXCISION Left 4/12/2017    Procedure: LT TOTAL SHOULDER REVERSE ARTHROPLASTY;  Surgeon: Ezequiel Elizalde MD;  Location: [facility] OR [facility];  Service:       Current Problems   Admission Diagnosis:  Hypokalemia [E87.6]  Hypomagnesemia [E83.42]  Hyponatremia [E87.1]  Acute renal failure [N17.9]  Acute renal failure, unspecified acute renal failure type [N17.9]    Problem List:    Acute renal  "failure    Hypomagnesemia    Hypertension    Anemia    COPD (chronic obstructive pulmonary disease)    Arthritis     Applicable Nutrition Hx -     Anthropometrics       Height: 157.5 cm (62\")  Weight: 65.8 kg (145 lb) (07/28/25 1807)     BMI (Calculated): 26.5       Trending Weight Changes 08/01/25: No significant changes       Weight History  Wt Readings from Last 10 Encounters:   07/28/25 1807 65.8 kg (145 lb)   06/18/25 0038 64.2 kg (141 lb 8.6 oz)   06/17/25 2243 63.6 kg (140 lb 3.4 oz)   03/14/21 1522 72.6 kg (160 lb)   04/12/17 1049 74.4 kg (164 lb)   03/29/17 1115 75.2 kg (165 lb 11.2 oz)          Labs      Comment: Reviewed.     Results from last 7 days   Lab Units 08/01/25  0500 07/31/25  0544 07/30/25  0527 07/29/25  1149 07/29/25  0334 07/28/25  2331   SODIUM mmol/L 137 137 140   < > 140 140   POTASSIUM mmol/L 4.2 3.5 3.3*   < > 3.1* 3.3*   GLUCOSE mg/dL 80 86 84   < > 95 91   BUN mg/dL 23.0 27.0* 27.0*   < > 27.0* 29.0*   CREATININE mg/dL 0.92 1.20* 1.07*   < > 1.06* 1.17*   CALCIUM mg/dL 8.8 8.8 8.8   < > 8.6 8.7   PHOSPHORUS mg/dL 3.0 3.8 3.6   < > 3.8  --    MAGNESIUM mg/dL 1.6 1.4* 1.4*  --  1.6 1.1*   ALBUMIN g/dL 2.7* 3.0* 2.9*   < > 3.1* 3.3*   BILIRUBIN mg/dL  --   --  0.3  --  0.3 0.3   ALK PHOS U/L  --   --  99  --  110 116   AST (SGOT) U/L  --   --  9  --  14 14   ALT (SGPT) U/L  --   --  6  --  8 10    < > = values in this interval not displayed.     Results from last 7 days   Lab Units 08/01/25  0500 07/31/25  0544 07/30/25  0527   PLATELETS 10*3/mm3 238 245 233   HEMOGLOBIN g/dL 7.8* 8.8* 8.2*   HEMATOCRIT % 25.6* 27.8* 26.8*     Lab Results   Component Value Date    HGBA1C 5.40 06/24/2025          Medications       Scheduled Medications amLODIPine, 2.5 mg, Oral, Daily  atorvastatin, 10 mg, Oral, Nightly  bisoprolol, 5 mg, Oral, Daily  budesonide-formoterol, 2 puff, Inhalation, BID - RT  [Held by provider] celecoxib, 200 mg, Oral, Daily  famotidine, 20 mg, Oral, BID AC  levothyroxine, 125 " mcg, Oral, Q AM  magnesium oxide, 400 mg, Oral, BID  melatonin, 5 mg, Oral, Nightly  miconazole, 1 Application, Topical, Q12H  potassium chloride, 20 mEq, Oral, BID With Meals  sertraline, 100 mg, Oral, Daily        Infusions      PRN Medications   acetaminophen **OR** [DISCONTINUED] acetaminophen **OR** [DISCONTINUED] acetaminophen    senna-docusate sodium **AND** polyethylene glycol **AND** bisacodyl **AND** bisacodyl    Magnesium Standard Dose Replacement - Follow Nurse / BPA Driven Protocol    ondansetron    Potassium Replacement - Follow Nurse / BPA Driven Protocol     Physical Findings      Chewing/Swallowing    No issues identified at this time   Dentition Mouth/Teeth WDL: WDL         Skin      Skin intact     Bowel function + BM 7/30                  Edema         Intake & Output (last 3 days)         07/29 0701 07/30 0700 07/30 0701 07/31 0700 07/31 0701 08/01 0700 08/01 0701 08/02 0700    P.O. 120 360 720     I.V. (mL/kg)        IV Piggyback        Total Intake(mL/kg) 120 (1.8) 360 (5.5) 720 (10.9)     Net +120 +360 +720             Urine Unmeasured Occurrence 4 x 3 x 4 x     Stool Unmeasured Occurrence  1 x               Nutrition Focused Physical Exam     08/01/25: Not indicated at this time   1  Current Nutrition Orders & Evaluation of Intake      Oral Nutrition     Food Allergies/Intolerances NKFA   Current PO Diet Diet: Cardiac; Healthy Heart (2-3 Na+); Fluid Consistency: Thin (IDDSI 0)   Oral Nutrition Supplement None    Trending % PO Intake 08/01/25: %   2  Assessment & Plan   Nutrition Diagnosis and Goals       Nutrition Diagnosis 1 No nutrition diagnosis at this time        Goal(s) Maintain PO Intake      Nutrition Intervention and Prescription       Intervention  Continue with current interventions      Diet Prescription     Supplement Prescription     Education Provided     3  Monitoring/Evaluation       Monitor/Evaluation Per Protocol and PO Intake     RD Follow-Up Encounter 3-5 days  and prn     Electronically signed by:  Lizzie Loja RD  08/01/25 08:46 EDT

## 2025-08-01 NOTE — PROGRESS NOTES
"RENAL/KCC:     LOS: 0 days    Patient Care Team:  Heena Phan APRN as PCP - General (Nurse Practitioner)    Chief Complaint:  PHUONG on CKD    Subjective     Interval History:   Chart reviewed  No new complains  Wanting to go home    Objective     Vital Sign Min/Max for last 24 hours  Temp  Min: 97.1 °F (36.2 °C)  Max: 97.3 °F (36.3 °C)   BP  Min: 130/72  Max: 178/86   Pulse  Min: 54  Max: 63   Resp  Min: 16  Max: 19   SpO2  Min: 91 %  Max: 98 %   No data recorded   No data recorded     Flowsheet Rows      Flowsheet Row First Filed Value   Admission Height 157.5 cm (62\") Documented at 07/28/2025 1807   Admission Weight 65.8 kg (145 lb) Documented at 07/28/2025 1807            I/O this shift:  In: 120 [P.O.:120]  Out: -   I/O last 3 completed shifts:  In: 960 [P.O.:960]  Out: -     Physical Exam:  GEN: Awake, NAD  ENT: PERRL, EOMI, MMM  NECK: Supple, no JVD  CHEST: CTAB, no W/R/C  CV: RRR, no M/G/R  ABD: Soft, NT, +BS  SKIN: Warm and Dry  NEURO: CN's intact      WBC WBC   Date Value Ref Range Status   08/01/2025 5.70 3.40 - 10.80 10*3/mm3 Final   07/31/2025 6.13 3.40 - 10.80 10*3/mm3 Final   07/30/2025 4.95 3.40 - 10.80 10*3/mm3 Final      HGB Hemoglobin   Date Value Ref Range Status   08/01/2025 7.8 (L) 12.0 - 15.9 g/dL Final   07/31/2025 8.8 (L) 12.0 - 15.9 g/dL Final   07/30/2025 8.2 (L) 12.0 - 15.9 g/dL Final      HCT Hematocrit   Date Value Ref Range Status   08/01/2025 25.6 (L) 34.0 - 46.6 % Final   07/31/2025 27.8 (L) 34.0 - 46.6 % Final   07/30/2025 26.8 (L) 34.0 - 46.6 % Final      Platlets No results found for: \"LABPLAT\"   MCV MCV   Date Value Ref Range Status   08/01/2025 87.7 79.0 - 97.0 fL Final   07/31/2025 88.3 79.0 - 97.0 fL Final   07/30/2025 88.4 79.0 - 97.0 fL Final          Sodium Sodium   Date Value Ref Range Status   08/01/2025 137 136 - 145 mmol/L Final   07/31/2025 137 136 - 145 mmol/L Final   07/30/2025 140 136 - 145 mmol/L Final   07/29/2025 136 136 - 145 mmol/L Final      Potassium " "Potassium   Date Value Ref Range Status   08/01/2025 4.2 3.5 - 5.2 mmol/L Final   07/31/2025 3.5 3.5 - 5.2 mmol/L Final   07/30/2025 3.3 (L) 3.5 - 5.2 mmol/L Final   07/29/2025 3.5 3.5 - 5.2 mmol/L Final   07/29/2025 3.5 3.5 - 5.2 mmol/L Final      Chloride Chloride   Date Value Ref Range Status   08/01/2025 102 98 - 107 mmol/L Final   07/31/2025 103 98 - 107 mmol/L Final   07/30/2025 104 98 - 107 mmol/L Final   07/29/2025 100 98 - 107 mmol/L Final      CO2 CO2   Date Value Ref Range Status   08/01/2025 23.4 22.0 - 29.0 mmol/L Final   07/31/2025 23.6 22.0 - 29.0 mmol/L Final   07/30/2025 24.0 22.0 - 29.0 mmol/L Final   07/29/2025 23.9 22.0 - 29.0 mmol/L Final      BUN BUN   Date Value Ref Range Status   08/01/2025 23.0 8.0 - 23.0 mg/dL Final   07/31/2025 27.0 (H) 8.0 - 23.0 mg/dL Final   07/30/2025 27.0 (H) 8.0 - 23.0 mg/dL Final   07/29/2025 26.0 (H) 8.0 - 23.0 mg/dL Final      Creatinine Creatinine   Date Value Ref Range Status   08/01/2025 0.92 0.57 - 1.00 mg/dL Final   07/31/2025 1.20 (H) 0.57 - 1.00 mg/dL Final   07/30/2025 1.07 (H) 0.57 - 1.00 mg/dL Final   07/29/2025 1.05 (H) 0.57 - 1.00 mg/dL Final      Calcium Calcium   Date Value Ref Range Status   08/01/2025 8.8 8.2 - 9.6 mg/dL Final   07/31/2025 8.8 8.2 - 9.6 mg/dL Final   07/30/2025 8.8 8.2 - 9.6 mg/dL Final   07/29/2025 9.1 8.2 - 9.6 mg/dL Final      PO4 No results found for: \"CAPO4\"   Albumin Albumin   Date Value Ref Range Status   08/01/2025 2.7 (L) 3.5 - 5.2 g/dL Final   07/31/2025 3.0 (L) 3.5 - 5.2 g/dL Final   07/30/2025 2.9 (L) 3.5 - 5.2 g/dL Final   07/29/2025 3.4 (L) 3.5 - 5.2 g/dL Final      Magnesium Magnesium   Date Value Ref Range Status   08/01/2025 1.6 1.6 - 2.4 mg/dL Final   07/31/2025 1.4 (L) 1.6 - 2.4 mg/dL Final   07/30/2025 1.4 (L) 1.6 - 2.4 mg/dL Final      Uric Acid No results found for: \"URICACID\"        Results Review:     I reviewed the patient's new clinical results.    amLODIPine, 2.5 mg, Oral, Daily  atorvastatin, 10 mg, " Oral, Nightly  bisoprolol, 5 mg, Oral, Daily  budesonide-formoterol, 2 puff, Inhalation, BID - RT  [Held by provider] celecoxib, 200 mg, Oral, Daily  famotidine, 20 mg, Oral, BID AC  levothyroxine, 125 mcg, Oral, Q AM  magnesium oxide, 400 mg, Oral, BID  melatonin, 5 mg, Oral, Nightly  miconazole, 1 Application, Topical, Q12H  potassium chloride, 20 mEq, Oral, BID With Meals  sertraline, 100 mg, Oral, Daily           Medication Review: Reviewed    Assessment & Plan       Acute renal failure    Hypomagnesemia    Hypertension    Anemia    COPD (chronic obstructive pulmonary disease)    Arthritis    Hypokalemia - nutritional    Hypomagnesemia - multifactorial: PPI, nutritional    Plan: Cr improved to 0.9 = baseline.  K and Mag WNL this AM.  Encourage PO as able.  Minimize Celebrex as able.  Could use Tramadol, Tylenol safely.  Keep off diuretics.  OK for D/C from a renal standpoint.      Caden Hahn MD  [facility]  08/01/25  06:22 EDT

## 2025-08-01 NOTE — PLAN OF CARE
Goal Outcome Evaluation:  Plan of Care Reviewed With: patient           Outcome Evaluation: pt able to complete functional mobility in room and bathroom with RW and min A. Pt req max A for toileting and LBD. Pt with impaired tolerance and able to complete short distances in room to toiet to chair. Pt with impaired insight and declining any rehab stating she is going home. Therapist expressing concerns due to level of assit these past two sessions and pt expressing prior to the hospital she could do it, and that the same people helping prior will help (daughter with plans for foot sx after pt home). Pt would cont to benefit from d/c to [facility] 2/2 impairments and safety concerns.    Anticipated Discharge Disposition (OT): sub acute care setting, skilled nursing facility

## 2025-08-01 NOTE — CONSULTS
Chp visited Pt per RN referral. Chp and Pt discussed how Pt was feeling and the loss of her mother. Chp offered supportive presence and remains available as needed.

## 2025-08-01 NOTE — THERAPY TREATMENT NOTE
Patient Name: Candice Walker  : 1935    MRN: [id number]                              Today's Date: 2025       Admit Date: 2025    Visit Dx:     ICD-10-CM ICD-9-CM   1. Acute renal failure, unspecified acute renal failure type  N17.9 584.9   2. Hyponatremia  E87.1 276.1   3. Hypomagnesemia  E83.42 275.2   4. Hypokalemia  E87.6 276.8     Patient Active Problem List   Diagnosis    S/p reverse total shoulder arthroplasty    Acute UTI (urinary tract infection)    PHUONG (acute kidney injury)    Hypomagnesemia    Hypertension    Anemia    COPD (chronic obstructive pulmonary disease)    Alcohol use    DDD (degenerative disc disease), cervical    Acute renal failure    Arthritis     Past Medical History:   Diagnosis Date    Anemia     Anxiety     Arthritis     Cancer 2006    BREAST CANCER, RIGHT MASTECTOMY    Chronic bronchitis     Depression     Disease of thyroid gland     GERD (gastroesophageal reflux disease)     Hard of hearing     Heart murmur     History of kidney stones     History of UTI 2017    HAD ABX    Hyperlipidemia     Hypertension     MRSA (methicillin resistant Staphylococcus aureus) infection s    EARLY , HERE AT [facility], LEFT KNEE  , D/W GISELLE IN INFECTION CONTROL    RBBB     Risk factors for obstructive sleep apnea     Shoulder pain     LEFT     Past Surgical History:   Procedure Laterality Date    APPENDECTOMY      CATARACT EXTRACTION      WITH LENS IMPLANT    CHOLECYSTECTOMY      CYST REMOVAL      ON SPINE    EYE SURGERY      HYSTERECTOMY      JOINT REPLACEMENT      LT KNEE    MASTECTOMY Right 2006    MASTECTOMY RADICAL WITH AXILLARY NODE DISSECTION Right     THYROIDECTOMY      TOTAL SHOULDER ARTHROPLASTY W/ DISTAL CLAVICLE EXCISION Left 2017    Procedure: LT TOTAL SHOULDER REVERSE ARTHROPLASTY;  Surgeon: Ezequiel Elizalde MD;  [place] [facility] [place] [facility] [place]:       General Information       Row Name [id number]          Physical Therapy Time and Intention    Mode of  Treatment co-treatment;physical therapy  -MG       Row Name 08/01/25 1508          General Information    Patient Profile Reviewed yes  -MG     Existing Precautions/Restrictions fall  -MG     Barriers to Rehab previous functional deficit  -MG       Row Name 08/01/25 1508          Cognition    Orientation Status (Cognition) oriented x 3  -MG       Row Name 08/01/25 1508          Safety Issues/Impairments Affecting Functional Mobility    Safety Issues Affecting Function (Mobility) awareness of need for assistance;insight into deficits/self-awareness;judgment;positioning of assistive device;problem-solving;sequencing abilities;safety precaution awareness  -MG     Impairments Affecting Function (Mobility) balance;endurance/activity tolerance;strength;pain;cognition  -MG     Comment, Safety Issues/Impairments (Mobility) Co treatment medically appropriate and necessary due to patient acuity level, activity tolerance and safety of patient and staff. Treatment is focusing on progression of care and goals established in the POC. Gait belt and non-skid socks donned.  -MG               User Key  (r) = Recorded By, (t) = Taken By, (c) = Cosigned By      Initials Name Provider Type    MG Nicole Bernard, PT Physical Therapist                   Mobility       Row Name 08/01/25 1509          Bed Mobility    Comment, (Bed Mobility) Sitting EOB on arrival and sitting in the chair at the end of the session.  -MG       Row Name 08/01/25 1509          Sit-Stand Transfer    Sit-Stand [place] (Transfers) contact guard;moderate assist (50% patient effort);verbal cues  -MG     Assistive Device (Sit-Stand Transfers) walker, front-wheeled  -MG     Comment, (Sit-Stand Transfer) CGA w/ time to stand from an elevated EOB. ModA to stand from the low commode as pt unable to use RUE on grab bar d/t pain.  -MG       Row Name 08/01/25 1509          Gait/Stairs (Locomotion)    [place] Level (Gait) minimum assist (75% patient effort);verbal  cues;nonverbal cues (demo/gesture)  -MG     Assistive Device (Gait) walker, front-wheeled  -MG     Patient was able to Ambulate yes  -MG     Distance in Feet (Gait) 12  x2  -MG     Deviations/Abnormal Patterns (Gait) lata decreased;gait speed decreased;antalgic;stride length decreased;base of support, narrow  -MG     Bilateral Gait Deviations forward flexed posture;heel strike decreased  -MG     Right Sided Gait Deviations weight shift ability decreased  -MG     Comment, (Gait/Stairs) Some unsteadiness throughout gait.. Soft knees at times d/t increase in pain with weight bearing. No overt LOB or buckling. Declines further mobility d/t pain.  -MG               User Key  (r) = Recorded By, (t) = Taken By, (c) = Cosigned By      Initials Name Provider Type    Nicole Pro, PT Physical Therapist                   Obj/Interventions       Row Name 08/01/25 1512          Motor Skills    Therapeutic Exercise other (see comments)  BLE LAQ x3. Declines further d/t pain/fatigue.  -MG       Row Name 08/01/25 1512          Balance    Comment, Balance Static standing w/ CG/Chris w/ RW while OT performing pia-hygiene and LBD (see their note).  -MG               User Key  (r) = Recorded By, (t) = Taken By, (c) = Cosigned By      Initials Name Provider Type    Nicole Pro, PT Physical Therapist                   Goals/Plan    No documentation.                  Clinical Impression       Row Name 08/01/25 1513          Pain    Pain Location knee;shoulder;wrist  -MG     Pain Side/Orientation right  -MG     Pain Management Interventions premedicated for activity;exercise or physical activity utilized;activity modification encouraged;positioning techniques utilized  -MG     Response to Pain Interventions activity participation with tolerable pain  -MG     Pre/Posttreatment Pain Comment Does not provide a rating.  -MG       Row Name 08/01/25 1513          Plan of Care Review    Plan of Care Reviewed With patient  -MG      Progress no change  -MG     Outcome Evaluation Pt seen for PT/OT cotx this PM and tolerated the session fairly. Pt was seated EOB on arrival with urgent request for the bathroom. Per nsg pt was very unsteady this AM and required a BSC due to her balance. This session pt agreeable for bathroom attempt. Today, pt was CGA with time to stand from an elevated EOB to RW, Chris to ambulate 12' to the bathroom, ModA to stand from the low commode and Chris to ambulate additional 12' to the chair. Pt demos a slow, antalgic pace with soft knees at times d/t increase in pain. No overt LOB, buckling, dizziness or SOB, but was unsteady at times. Pt required CG/Chris for static standing w/ RW while OT assisting with pia-hygiene and LBD (see OT note). Pt declined further mobility or additional reps/exercises d/t to fatigue and pain. Discussed DC options as pt reports her daughter will be having foot/ankle surgery and will be unable to assist her at home. Explained with pts current assist level, endurance and balance, as she is a falls risk, that rehab would be her safer option since she would need to be independent at home. Discussed w/ RN and CCP. PT will cont to follow and rec SNF at DC.  -MG       Row Name 08/01/25 1510          Therapy Assessment/Plan (PT)    Rehab Potential (PT) fair  -MG     Criteria for Skilled Interventions Met (PT) yes;meets criteria  -MG     Therapy Frequency (PT) 5 times/wk  -MG       Row Name 08/01/25 1513          Positioning and Restraints    Pre-Treatment Position other (comment)  Sitting EOB  -MG     Post Treatment Position chair  -MG     In Chair notified nsg;call light within reach;encouraged to call for assist;exit alarm on;sitting  Tray table in front. Declines legs to be elevated.  -MG               User Key  (r) = Recorded By, (t) = Taken By, (c) = Cosigned By      Initials Name Provider Type    Nicole Pro, PT Physical Therapist                   Outcome Measures       Row Name 08/01/25  1518 08/01/25 0834       How much help from another person do you currently need...    Turning from your back to your side while in flat bed without using bedrails? 3  -MG 3  -KM    Moving from lying on back to sitting on the side of a flat bed without bedrails? 3  -MG 3  -KM    Moving to and from a bed to a chair (including a wheelchair)? 3  -MG 3  -KM    Standing up from a chair using your arms (e.g., wheelchair, bedside chair)? 2  -MG 3  -KM    Climbing 3-5 steps with a railing? 2  -MG 2  -KM    To walk in hospital room? 3  -MG 3  -KM    AM-PAC 6 Clicks Score (PT) 16  -MG 17  -KM              User Key  (r) = Recorded By, (t) = Taken By, (c) = Cosigned By      Initials Name Provider Type    MG Nicole Bernard, PT Physical Therapist     Ammy Gibbs, RN Registered Nurse                                 Physical Therapy Education       Title: PT OT SLP Therapies (Done)       Topic: Physical Therapy (Done)       Point: Mobility training (Done)       Learning Progress Summary            Patient Acceptance, E,D,TB, VU,NR by  at 8/1/2025 1518    Acceptance, E,TB, VU by MS at 7/30/2025 1150                      Point: Home exercise program (Done)       Learning Progress Summary            Patient Acceptance, E,TB, VU by MS at 7/30/2025 1150                      Point: Body mechanics (Done)       Learning Progress Summary            Patient Acceptance, E,D,TB, VU,NR by  at 8/1/2025 1518    Acceptance, E,TB, VU by MS at 7/30/2025 1150                      Point: Precautions (Done)       Learning Progress Summary            Patient Acceptance, E,D,TB, VU,NR by  at 8/1/2025 1518    Acceptance, E,TB, VU by MS at 7/30/2025 1150                                      User Key       Initials Effective Dates Name Provider Type Discipline     05/24/22 -  Nicole Bernard, PT Physical Therapist PT    MS 06/16/21 -  Izabel Toussaint PT Physical Therapist PT                  PT Recommendation and Plan     Progress: no  change  Outcome Evaluation: Pt seen for PT/OT cotx this PM and tolerated the session fairly. Pt was seated EOB on arrival with urgent request for the bathroom. Per nsg pt was very unsteady this AM and required a BSC due to her balance. This session pt agreeable for bathroom attempt. Today, pt was CGA with time to stand from an elevated EOB to RW, Chris to ambulate 12' to the bathroom, ModA to stand from the low commode and Chris to ambulate additional 12' to the chair. Pt demos a slow, antalgic pace with soft knees at times d/t increase in pain. No overt LOB, buckling, dizziness or SOB, but was unsteady at times. Pt required CG/Chris for static standing w/ RW while OT assisting with pia-hygiene and LBD (see OT note). Pt declined further mobility or additional reps/exercises d/t to fatigue and pain. Discussed DC options as pt reports her daughter will be having foot/ankle surgery and will be unable to assist her at home. Explained with pts current assist level, endurance and balance, as she is a falls risk, that rehab would be her safer option since she would need to be independent at home. Discussed w/ RN and CCP. PT will cont to follow and rec SNF at DC.     Time Calculation:         PT Charges       Row Name 08/01/25 1519             Time Calculation    Start Time 1414  -MG      Stop Time 1441  -MG      Time Calculation (min) 27 min  -MG      PT Received On 08/01/25  -MG      PT - Next Appointment 08/04/25  -MG                User Key  (r) = Recorded By, (t) = Taken By, (c) = Cosigned By      Initials Name Provider Type    MG Nicole Bernard, PT Physical Therapist                  Therapy Charges for Today       Code Description Service Date Service Provider Modifiers Qty    78108180050 HC PT THERAPEUTIC ACT EA 15 MIN 8/1/2025 Nicole Bernard, PT GP 1    88384823047 HC PT THER PROC EA 15 MIN 8/1/2025 Nicole Bernard, PT GP 1            PT G-Codes  Outcome Measure Options: AM-PAC 6 Clicks Daily Activity (OT)  AM-PAC 6  Clicks Score (PT): 16  AM-PAC 6 Clicks Score (OT): 14  PT Discharge Summary  Anticipated Discharge Disposition (PT): skilled nursing facility    Nicole Bernard, PT  8/1/2025

## 2025-08-01 NOTE — PLAN OF CARE
Goal Outcome Evaluation:              Outcome Evaluation: Pt is AOx3. Pt is on RA. Pt is assist x1 to bathroom. Pt has PRN pain meds avalible. Pt potentially discharge today.

## 2025-08-01 NOTE — PLAN OF CARE
Goal Outcome Evaluation:  Plan of Care Reviewed With: patient        Progress: no change  Outcome Evaluation: Patient A & O x 3. Patient noted with shortness of air and weakness once up to toilet this morning. Patient said that gait belt was interfering with her breathing, assisted to chair with assist of Charge nurse. MD in room. PRN pain meds given as ordered. R shoulder xray ordered due to pain. Sitting up in chair now. BM today. Discharge plan pending. Access consulted. No s/s of distress noted.

## 2025-08-01 NOTE — PROGRESS NOTES
Name: Candice Walker ADMIT: 2025   : 1935  PCP: Heena Phan APRN    MRN: [id number] LOS: 0 days   AGE/SEX: 89 y.o. female  ROOM: [facility]     Subjective   Subjective   Still having a lot of arthritic pain.  States right shoulder hurts worse than usual.       Objective   Objective   Vital Signs  Temp:  [97.2 °F (36.2 °C)-97.3 °F (36.3 °C)] 97.3 °F (36.3 °C)  Heart Rate:  [53-63] 53  Resp:  [16-19] 16  BP: (120-178)/(51-86) 120/51  SpO2:  [90 %-98 %] 90 %  on   ;   Device (Oxygen Therapy): room air  Body mass index is 26.52 kg/m².  Physical Exam  Vitals and nursing note reviewed.   Constitutional:       Comments: Uncomfortable in appearance looks mildly labored with her breathing coming back from the bathroom   Cardiovascular:      Rate and Rhythm: Normal rate and regular rhythm.   Pulmonary:      Effort: Pulmonary effort is normal.      Breath sounds: Normal breath sounds. No wheezing or rhonchi.   Abdominal:      General: Bowel sounds are normal.      Palpations: Abdomen is soft.      Tenderness: There is no abdominal tenderness.   Musculoskeletal:         General: No swelling.   Skin:     General: Skin is warm and dry.      Coloration: Skin is pale.      Findings: Bruising present.   Neurological:      Mental Status: She is alert. Mental status is at baseline.       Results Review     I reviewed the patient's new clinical results.  Results from last 7 days   Lab Units 25  0500 25  0525  0525  2331   WBC 10*3/mm3 5.70 6.13 4.95 6.45   HEMOGLOBIN g/dL 7.8* 8.8* 8.2* 9.5*   PLATELETS 10*3/mm3 238 245 233 267     Results from last 7 days   Lab Units 25  0500 25  0544 25  0525  2324 25  1149   SODIUM mmol/L 137 137 140  --  136   POTASSIUM mmol/L 4.2 3.5 3.3* 3.5 3.5   CHLORIDE mmol/L 102 103 104  --  100   CO2 mmol/L 23.4 23.6 24.0  --  23.9   BUN mg/dL 23.0 27.0* 27.0*  --  26.0*   CREATININE mg/dL 0.92 1.20* 1.07*  --  1.05*  "  GLUCOSE mg/dL 80 86 84  --  121*   EGFR mL/min/1.73 59.3* 43.1* 49.4*  --  50.6*     Results from last 7 days   Lab Units 08/01/25  0500 07/31/25  0544 07/30/25  0527 07/29/25  1149 07/29/25  0334 07/28/25  2331 07/28/25  1818   ALBUMIN g/dL 2.7* 3.0* 2.9* 3.4* 3.1* 3.3* 3.3*   BILIRUBIN mg/dL  --   --  0.3  --  0.3 0.3 0.4   ALK PHOS U/L  --   --  99  --  110 116 128*   AST (SGOT) U/L  --   --  9  --  14 14 15   ALT (SGPT) U/L  --   --  6  --  8 10 7     Results from last 7 days   Lab Units 08/01/25  0500 07/31/25  0544 07/30/25  0527 07/29/25  1149 07/29/25  0334   CALCIUM mg/dL 8.8 8.8 8.8 9.1 8.6   ALBUMIN g/dL 2.7* 3.0* 2.9* 3.4* 3.1*   MAGNESIUM mg/dL 1.6 1.4* 1.4*  --  1.6   PHOSPHORUS mg/dL 3.0 3.8 3.6 3.8 3.8       No results found for: \"HGBA1C\", \"POCGLU\"    No radiology results for the last day    I have personally reviewed all medications:  Scheduled Medications  amLODIPine, 2.5 mg, Oral, Daily  atorvastatin, 10 mg, Oral, Nightly  bisoprolol, 5 mg, Oral, Daily  budesonide-formoterol, 2 puff, Inhalation, BID - RT  [Held by provider] celecoxib, 200 mg, Oral, Daily  famotidine, 20 mg, Oral, BID AC  levothyroxine, 125 mcg, Oral, Q AM  magnesium oxide, 400 mg, Oral, BID  melatonin, 5 mg, Oral, Nightly  miconazole, 1 Application, Topical, Q12H  potassium chloride, 20 mEq, Oral, BID With Meals  sertraline, 100 mg, Oral, Daily    Infusions   Diet  Diet: Cardiac; Healthy Heart (2-3 Na+); Fluid Consistency: Thin (IDDSI 0)    I have personally reviewed:  [x]  Laboratory   [x]  Microbiology   [x]  Radiology   [x]  EKG/Telemetry  [x]  Cardiology/Vascular   []  Pathology    []  Records       Assessment/Plan     Active Hospital Problems    Diagnosis  POA    **Acute renal failure [N17.9]  Yes    Arthritis [M19.90]  Yes    COPD (chronic obstructive pulmonary disease) [J44.9]  Yes    Hypomagnesemia [E83.42]  Yes    Anemia [D64.9]  Yes    Hypertension [I10]  Yes      Resolved Hospital Problems   No resolved problems to " display.       89-year-old female with history of CKD, HTN, COPD, and chronic arthritic pain presented with generalized weakness and electrolyte disturbances. She was recently hospitalized for PHUONG and hyponatremia, with several medication changes including stopping Celebrex and Lasix. She was restarted on Celebrex by her PCP, and now presents with recurrent electrolyte derangements and low magnesium possibly related to chronic PPI use.     Acute renal failure  -Creatinine had risen to 1.47 on prior admission but is now improved to 0.92 with baseline ~0.8.  -Nephrology recommends using Celebrex sparingly; tramadol available for pain if needed.  -Avoid diuretics at discharge and ensure close outpatient follow-up.    Hypomagnesemia / Hypokalemia  -Magnesium improved to 1.6 from prior eunice of 1.1.  -Recurring hypomagnesemia likely related to chronic PPI use.  -Omeprazole discontinued and Pepcid started.    Anemia  -Hemoglobin had improved to 8.8 from 8.2 but is now down to 7.8 without signs of bleeding.  -Findings may reflect chronic disease or hydration status fluctuations.    Hypertension  -Blood pressure slightly elevated but stable throughout admission.  -Continue home amlodipine.    Arthritis  -Pain improved with Celebrex, which was restarted prior to admission.  -Nephrology recommends holding Celebrex short-term for monitoring renal status.  -Tramadol available as backup option for severe pain.  -Complains of worsening right shoulder pain since her admission with significant decrease in ROM.  Will check x-ray.    Skin irritation, likely yeast  -Erythema noted under breast consistent with intertrigo.  -Treated with miconazole powder       SCDs for DVT prophylaxis.  Full code.  Discussed with patient and consulting provider.  Anticipate discharge home possibly later today  Expected Discharge Date: 8/1/2025; Expected Discharge Time:       Teddy Matthews MD  [facility]  08/01/25  10:22 EDT

## 2025-08-01 NOTE — PROGRESS NOTES
"Continued Stay Note  [facility]     Patient Name: Candice Walker  MRN: [id number]  Today's Date: 8/1/2025    Admit Date: 7/28/2025    Plan: Plans home at this time   Discharge Plan       Row Name 08/01/25 1703       Plan    Plan Plans home at this time    Plan Comments Spoke with patient extensively regarding d/c planning. The called and spoke with daughter Paty  [phone] who states she prefers patient to d/c to SNF. Patient is AxO with no diagnosis of dementia and is admantly refusing to d/c to rehab. Physical therapy is also advising SNF however I explained thoroughly the patients rights to the patient and daughter. Per Paty, she plans to speak with patient and will let CCP know if patient changes her mind or if they are absolutley unable to care for her at home. The daughter expressed she will be having foot surgery \"soon\" but there is also a grandchild who lives there and capable to help if needed. Patient did walk with min assist 25ft as of 7/31. CCP to watch progress and to assist if plan has changed from d/c home with family. At this time, patient plans to d/c home.                   Discharge Codes    No documentation.                 Expected Discharge Date and Time       Expected Discharge Date Expected Discharge Time    Aug 1, 2025               Alysia Anguiano RN    "

## 2025-08-01 NOTE — THERAPY TREATMENT NOTE
Patient Name: Candice Walker  : 1935    MRN: [id number]                              Today's Date: 2025       Admit Date: 2025    Visit Dx:     ICD-10-CM ICD-9-CM   1. Acute renal failure, unspecified acute renal failure type  N17.9 584.9   2. Hyponatremia  E87.1 276.1   3. Hypomagnesemia  E83.42 275.2   4. Hypokalemia  E87.6 276.8     Patient Active Problem List   Diagnosis    S/p reverse total shoulder arthroplasty    Acute UTI (urinary tract infection)    PHUONG (acute kidney injury)    Hypomagnesemia    Hypertension    Anemia    COPD (chronic obstructive pulmonary disease)    Alcohol use    DDD (degenerative disc disease), cervical    Acute renal failure    Arthritis     Past Medical History:   Diagnosis Date    Anemia     Anxiety     Arthritis     Cancer 2006    BREAST CANCER, RIGHT MASTECTOMY    Chronic bronchitis     Depression     Disease of thyroid gland     GERD (gastroesophageal reflux disease)     Hard of hearing     Heart murmur     History of kidney stones     History of UTI 2017    HAD ABX    Hyperlipidemia     Hypertension     MRSA (methicillin resistant Staphylococcus aureus) infection s    EARLY , HERE AT [facility], LEFT KNEE  , D/W GISELLE IN INFECTION CONTROL    RBBB     Risk factors for obstructive sleep apnea     Shoulder pain     LEFT     Past Surgical History:   Procedure Laterality Date    APPENDECTOMY      CATARACT EXTRACTION      WITH LENS IMPLANT    CHOLECYSTECTOMY      CYST REMOVAL      ON SPINE    EYE SURGERY      HYSTERECTOMY      JOINT REPLACEMENT      LT KNEE    MASTECTOMY Right 2006    MASTECTOMY RADICAL WITH AXILLARY NODE DISSECTION Right     THYROIDECTOMY      TOTAL SHOULDER ARTHROPLASTY W/ DISTAL CLAVICLE EXCISION Left 2017    Procedure: LT TOTAL SHOULDER REVERSE ARTHROPLASTY;  Surgeon: Ezequiel Elizalde MD;  Location: [facility] OR [facility] [place];  Service:       General Information       Row Name 25 1616          OT Time and Intention    Document Type therapy note  (daily note)  -KR     Mode of Treatment co-treatment;physical therapy;occupational therapy  -KR     Patient Effort good  -KR       Row Name 08/01/25 1616          General Information    Patient Profile Reviewed yes  -KR     Existing Precautions/Restrictions fall  -KR       Row Name 08/01/25 1616          Cognition    Orientation Status (Cognition) oriented x 3  -KR       Row Name 08/01/25 1616          Safety Issues/Impairments Affecting Functional Mobility    Safety Issues Affecting Function (Mobility) ability to follow commands;at risk behavior observed;impulsivity;insight into deficits/self-awareness;judgment;sequencing abilities;problem-solving;safety precaution awareness  -KR     Impairments Affecting Function (Mobility) balance;endurance/activity tolerance;strength;pain;cognition  -KR     Cognitive Impairments, Mobility Safety/Performance attention;awareness, need for assistance;impulsivity;insight into deficits/self-awareness;problem-solving/reasoning;judgment;safety precaution follow-through  -KR     Comment, Safety Issues/Impairments (Mobility) gaitbelt and non-slip socks donned. Co-treatment medically necessary due to patients acuity level and for the safe progression of POC and the safety of patient and staff  -KR               User Key  (r) = Recorded By, (t) = Taken By, (c) = Cosigned By      Initials Name Provider Type    KR Ted Montez OT Occupational Therapist                     Mobility/ADL's       Row Name 08/01/25 1616          Bed Mobility    Comment, (Bed Mobility) siting EOB upon entry  -KR       Row Name 08/01/25 1616          Transfers    Transfers sit-stand transfer;toilet transfer  -KR       Row Name 08/01/25 1616          Sit-Stand Transfer    Sit-Stand [place] (Transfers) contact guard;moderate assist (50% patient effort);verbal cues  -KR     Assistive Device (Sit-Stand Transfers) walker, front-wheeled  -KR       Row Name 08/01/25 1616          Toilet Transfer    Type (Toilet  Transfer) sit-stand;stand-sit  -KR     [place] Level (Toilet Transfer) moderate assist (50% patient effort)  -KR     Assistive Device (Toilet Transfer) grab bars/safety frame  -KR       Row Name 08/01/25 1616          Functional Mobility    Functional Mobility- Ind. Level 1 person;minimum assist (75% patient effort)  -KR     Functional Mobility- Device walker, front-wheeled  -KR     Functional Mobility- Comment pt unsteady and req A for mobiltiy. pt req inc time and cues for safety  -KR     Patient was able to Ambulate yes  -KR       Row Name 08/01/25 1616          Activities of Daily Living    BADL Assessment/Intervention toileting;lower body dressing;feeding  -KR       Row Name 08/01/25 1616          Toileting Assessment/Training    [place] Level (Toileting) perform perineal hygiene;maximum assist (25% patient effort);change pad/brief  -KR     Assistive Devices (Toileting) grab bar/safety frame  -KR     Comment, (Toileting) max A for pericare; changing underwear  -KR       Row Name 08/01/25 1616          Lower Body Dressing Assessment/Training    [place] Level (Lower Body Dressing) doff;don;pants/bottoms  -KR     Comment, (Lower Body Dressing) max A for donning/doffing underwear  -KR       Row Name 08/01/25 1616          Self-Feeding Assessment/Training    Comment, (Feeding) pt req set up for dessert and drink  -KR               User Key  (r) = Recorded By, (t) = Taken By, (c) = Cosigned By      Initials Name Provider Type    Ted Duron OT Occupational Therapist                   Obj/Interventions       Row Name 08/01/25 1618          Motor Skills    Motor Skills functional endurance  -KR     Functional Endurance fair- endurance; pt limited to short distances  -KR               User Key  (r) = Recorded By, (t) = Taken By, (c) = Cosigned By      Initials Name Provider Type    Ted Duron OT Occupational Therapist                   Goals/Plan    No documentation.                  Clinical  Impression       Row Name 08/01/25 1619          Pain Assessment    Pre/Posttreatment Pain Comment pt expressing pain but does not provide rating  -KR       Row Name 08/01/25 1619          Plan of Care Review    Plan of Care Reviewed With patient  -KR     Outcome Evaluation pt able to complete functional mobility in room and bathroom with RW and min A. Pt req max A for toileting and LBD. Pt with impaired tolerance and able to complete short distances in room to toiet to chair. Pt with impaired insight and declining any rehab stating she is going home. Therapist expressing concerns due to level of assit these past two sessions and pt expressing prior to the hospital she could do it, and that the same people helping prior will help (daughter with plans for foot sx after pt home). Pt would cont to benefit from d/c to [facility] 2/2 impairments and safety concerns.  -KR       Row Name 08/01/25 1619          Therapy Assessment/Plan (OT)    Rehab Potential (OT) good  -KR     Criteria for Skilled Therapeutic Interventions Met (OT) yes  -KR     Therapy Frequency (OT) 5 times/wk  -KR       Row Name 08/01/25 1619          Therapy Plan Review/Discharge Plan (OT)    Anticipated Discharge Disposition (OT) sub acute care setting;skilled nursing facility  -KR       Row Name 08/01/25 1619          Positioning and Restraints    Pre-Treatment Position in bed  -KR     Post Treatment Position chair  -KR     In Chair notified nsg;sitting;call light within reach;exit alarm on;encouraged to call for assist  -KR               User Key  (r) = Recorded By, (t) = Taken By, (c) = Cosigned By      Initials Name Provider Type    Ted Duron, OT Occupational Therapist                   Outcome Measures       Row Name 08/01/25 1622          How much help from another is currently needed...    Putting on and taking off regular lower body clothing? 2  -KR     Bathing (including washing, rinsing, and drying) 2  -KR     Toileting (which includes using  toilet bed pan or urinal) 2  -KR     Putting on and taking off regular upper body clothing 3  -KR     Taking care of personal grooming (such as brushing teeth) 2  -KR     Eating meals 3  -KR     AM-PAC 6 Clicks Score (OT) 14  -KR       Row Name 08/01/25 1518 08/01/25 0834       How much help from another person do you currently need...    Turning from your back to your side while in flat bed without using bedrails? 3  -MG 3  -KM    Moving from lying on back to sitting on the side of a flat bed without bedrails? 3  -MG 3  -KM    Moving to and from a bed to a chair (including a wheelchair)? 3  -MG 3  -KM    Standing up from a chair using your arms (e.g., wheelchair, bedside chair)? 2  -MG 3  -KM    Climbing 3-5 steps with a railing? 2  -MG 2  -KM    To walk in hospital room? 3  -MG 3  -KM    AM-PAC 6 Clicks Score (PT) 16  -MG 17  -KM      Row Name 08/01/25 1622          Functional Assessment    Outcome Measure Options AM-PAC 6 Clicks Daily Activity (OT)  -KR               User Key  (r) = Recorded By, (t) = Taken By, (c) = Cosigned By      Initials Name Provider Type    Nicole Pro, PT Physical Therapist     Ammy Gibbs, RN Registered Nurse    Ted Duron, OT Occupational Therapist                      OT Recommendation and Plan  Therapy Frequency (OT): 5 times/wk  Plan of Care Review  Plan of Care Reviewed With: patient  Outcome Evaluation: pt able to complete functional mobility in room and bathroom with RW and min A. Pt req max A for toileting and LBD. Pt with impaired tolerance and able to complete short distances in room to toiet to chair. Pt with impaired insight and declining any rehab stating she is going home. Therapist expressing concerns due to level of assit these past two sessions and pt expressing prior to the hospital she could do it, and that the same people helping prior will help (daughter with plans for foot sx after pt home). Pt would cont to benefit from d/c to [facility] 2/2 impairments  and safety concerns.     Time Calculation:         Time Calculation- OT       Row Name 08/01/25 1623             Time Calculation- OT    OT Start Time 1414  -KR      OT Stop Time 1439  -KR      OT Time Calculation (min) 25 min  -KR      Total Timed Code Minutes- OT 25 minute(s)  -KR      OT Received On 08/01/25  -KR      OT - Next Appointment 08/04/25  -KR         Timed Charges    79985 - OT Therapeutic Activity Minutes 12  -KR      34396 - OT Self Care/Mgmt Minutes 13  -KR         Total Minutes    Timed Charges Total Minutes 25  -KR       Total Minutes 25  -KR                User Key  (r) = Recorded By, (t) = Taken By, (c) = Cosigned By      Initials Name Provider Type    KR Ted Montez OT Occupational Therapist                  Therapy Charges for Today       Code Description Service Date Service Provider Modifiers Qty    44558576143 HC OT THERAPEUTIC ACT EA 15 MIN 7/31/2025 Ted Montez OT GO 1    57405558170 HC OT SELF CARE/MGMT/TRAIN EA 15 MIN 7/31/2025 Ted Montez OT GO 1    32156332881 HC OT THERAPEUTIC ACT EA 15 MIN 8/1/2025 Ted Montez OT GO 1    44328843927 HC OT SELF CARE/MGMT/TRAIN EA 15 MIN 8/1/2025 Ted Montez OT GO 1                 Ted Montez OT  8/1/2025

## 2025-08-02 VITALS
BODY MASS INDEX: 26.68 KG/M2 | TEMPERATURE: 98.9 F | HEART RATE: 55 BPM | SYSTOLIC BLOOD PRESSURE: 173 MMHG | HEIGHT: 62 IN | WEIGHT: 145 LBS | OXYGEN SATURATION: 90 % | DIASTOLIC BLOOD PRESSURE: 66 MMHG | RESPIRATION RATE: 16 BRPM

## 2025-08-02 LAB
ALBUMIN SERPL-MCNC: 2.8 G/DL (ref 3.5–5.2)
ANION GAP SERPL CALCULATED.3IONS-SCNC: 6.4 MMOL/L (ref 5–15)
BUN SERPL-MCNC: 17 MG/DL (ref 8–23)
BUN/CREAT SERPL: 22.4 (ref 7–25)
CALCIUM SPEC-SCNC: 8.9 MG/DL (ref 8.2–9.6)
CHLORIDE SERPL-SCNC: 105 MMOL/L (ref 98–107)
CO2 SERPL-SCNC: 24.6 MMOL/L (ref 22–29)
CREAT SERPL-MCNC: 0.76 MG/DL (ref 0.57–1)
DEPRECATED RDW RBC AUTO: 46.4 FL (ref 37–54)
EGFRCR SERPLBLD CKD-EPI 2021: 74.5 ML/MIN/1.73
ERYTHROCYTE [DISTWIDTH] IN BLOOD BY AUTOMATED COUNT: 14.3 % (ref 12.3–15.4)
GLUCOSE SERPL-MCNC: 84 MG/DL (ref 65–99)
HCT VFR BLD AUTO: 26.8 % (ref 34–46.6)
HGB BLD-MCNC: 8.2 G/DL (ref 12–15.9)
MCH RBC QN AUTO: 27 PG (ref 26.6–33)
MCHC RBC AUTO-ENTMCNC: 30.6 G/DL (ref 31.5–35.7)
MCV RBC AUTO: 88.2 FL (ref 79–97)
PHOSPHATE SERPL-MCNC: 3.2 MG/DL (ref 2.5–4.5)
PLATELET # BLD AUTO: 235 10*3/MM3 (ref 140–450)
PMV BLD AUTO: 9.8 FL (ref 6–12)
POTASSIUM SERPL-SCNC: 4.7 MMOL/L (ref 3.5–5.2)
RBC # BLD AUTO: 3.04 10*6/MM3 (ref 3.77–5.28)
SODIUM SERPL-SCNC: 136 MMOL/L (ref 136–145)
WBC NRBC COR # BLD AUTO: 5.98 10*3/MM3 (ref 3.4–10.8)

## 2025-08-02 PROCEDURE — 85027 COMPLETE CBC AUTOMATED: CPT | Performed by: HOSPITALIST

## 2025-08-02 PROCEDURE — 80069 RENAL FUNCTION PANEL: CPT | Performed by: HOSPITALIST

## 2025-08-02 PROCEDURE — G0378 HOSPITAL OBSERVATION PER HR: HCPCS

## 2025-08-02 RX ORDER — CELECOXIB 200 MG/1
200 CAPSULE ORAL DAILY PRN
Qty: 20 CAPSULE | Refills: 0 | Status: SHIPPED | OUTPATIENT
Start: 2025-08-02

## 2025-08-02 RX ORDER — TRAMADOL HYDROCHLORIDE 50 MG/1
50 TABLET ORAL EVERY 6 HOURS PRN
Qty: 20 TABLET | Refills: 0 | Status: SHIPPED | OUTPATIENT
Start: 2025-08-02 | End: 2025-08-07

## 2025-08-02 RX ORDER — POTASSIUM CHLORIDE 1500 MG/1
20 TABLET, EXTENDED RELEASE ORAL DAILY
Status: DISCONTINUED | OUTPATIENT
Start: 2025-08-02 | End: 2025-08-02 | Stop reason: HOSPADM

## 2025-08-02 RX ORDER — ACETAMINOPHEN 325 MG/1
650 TABLET ORAL EVERY 4 HOURS PRN
Qty: 60 TABLET | Refills: 0 | Status: SHIPPED | OUTPATIENT
Start: 2025-08-02

## 2025-08-02 RX ADMIN — FAMOTIDINE 20 MG: 20 TABLET, FILM COATED ORAL at 06:35

## 2025-08-02 RX ADMIN — BISOPROLOL FUMARATE 5 MG: 5 TABLET ORAL at 09:19

## 2025-08-02 RX ADMIN — LEVOTHYROXINE SODIUM 125 MCG: 125 TABLET ORAL at 06:35

## 2025-08-02 RX ADMIN — SERTRALINE HYDROCHLORIDE 100 MG: 100 TABLET, FILM COATED ORAL at 09:19

## 2025-08-02 RX ADMIN — MAGNESIUM OXIDE TAB 400 MG (241.3 MG ELEMENTAL MG) 400 MG: 400 (241.3 MG) TAB at 09:19

## 2025-08-02 RX ADMIN — MICONAZOLE NITRATE 1 APPLICATION: 2 POWDER TOPICAL at 09:20

## 2025-08-02 RX ADMIN — AMLODIPINE BESYLATE 2.5 MG: 2.5 TABLET ORAL at 09:19

## 2025-08-02 RX ADMIN — TRAMADOL HYDROCHLORIDE 50 MG: 50 TABLET, COATED ORAL at 09:19

## 2025-08-02 RX ADMIN — POTASSIUM CHLORIDE 20 MEQ: 1500 TABLET, EXTENDED RELEASE ORAL at 09:19

## 2025-08-02 NOTE — DISCHARGE INSTRUCTIONS
#Discharge plan    -Follow-up with PCP in 3 to 5 days    -Follow-up with nephrology within 1 week    -Stop taking Lasix    -Stop taking hydrochlorothiazide    -Stop taking Avapro    -Stop taking potassium is off diuretics    -Patient to minimize Celebrex use, only take as needed, hydration encouraged    -Take Tylenol 650 mg p.o. every 4 hours as needed pain    -Take tramadol 50 mg p.o. every 6 hours as needed pain    -Patient declined SNF, home health arranged

## 2025-08-02 NOTE — PROGRESS NOTES
"RENAL/KCC:     LOS: 0 days    Patient Care Team:  Heena Phan APRN as PCP - General (Nurse Practitioner)    Chief Complaint:  PHUONG on CKD    Subjective     Interval History:   Chart reviewed  No new complains  Wanting to go home    Objective     Vital Sign Min/Max for last 24 hours  Temp  Min: 97.3 °F (36.3 °C)  Max: 98.8 °F (37.1 °C)   BP  Min: 120/51  Max: 198/75   Pulse  Min: 51  Max: 63   Resp  Min: 16  Max: 16   SpO2  Min: 90 %  Max: 96 %   No data recorded   No data recorded     Flowsheet Rows      Flowsheet Row First Filed Value   Admission Height 157.5 cm (62\") Documented at 07/28/2025 1807   Admission Weight 65.8 kg (145 lb) Documented at 07/28/2025 1807            No intake/output data recorded.  I/O last 3 completed shifts:  In: 1140 [P.O.:1140]  Out: -     Physical Exam:  GEN: Awake, NAD  ENT: PERRL, EOMI, MMM  NECK: Supple, no JVD  CHEST: CTAB, no W/R/C  CV: RRR, no M/G/R  ABD: Soft, NT, +BS  SKIN: Warm and Dry  NEURO: CN's intact      WBC WBC   Date Value Ref Range Status   08/02/2025 5.98 3.40 - 10.80 10*3/mm3 Final   08/01/2025 5.70 3.40 - 10.80 10*3/mm3 Final   07/31/2025 6.13 3.40 - 10.80 10*3/mm3 Final      HGB Hemoglobin   Date Value Ref Range Status   08/02/2025 8.2 (L) 12.0 - 15.9 g/dL Final   08/01/2025 7.8 (L) 12.0 - 15.9 g/dL Final   07/31/2025 8.8 (L) 12.0 - 15.9 g/dL Final      HCT Hematocrit   Date Value Ref Range Status   08/02/2025 26.8 (L) 34.0 - 46.6 % Final   08/01/2025 25.6 (L) 34.0 - 46.6 % Final   07/31/2025 27.8 (L) 34.0 - 46.6 % Final      Platlets No results found for: \"LABPLAT\"   MCV MCV   Date Value Ref Range Status   08/02/2025 88.2 79.0 - 97.0 fL Final   08/01/2025 87.7 79.0 - 97.0 fL Final   07/31/2025 88.3 79.0 - 97.0 fL Final          Sodium Sodium   Date Value Ref Range Status   08/02/2025 136 136 - 145 mmol/L Final   08/01/2025 137 136 - 145 mmol/L Final   07/31/2025 137 136 - 145 mmol/L Final      Potassium Potassium   Date Value Ref Range Status   08/02/2025 " "4.7 3.5 - 5.2 mmol/L Final   08/01/2025 4.2 3.5 - 5.2 mmol/L Final   07/31/2025 3.5 3.5 - 5.2 mmol/L Final      Chloride Chloride   Date Value Ref Range Status   08/02/2025 105 98 - 107 mmol/L Final   08/01/2025 102 98 - 107 mmol/L Final   07/31/2025 103 98 - 107 mmol/L Final      CO2 CO2   Date Value Ref Range Status   08/02/2025 24.6 22.0 - 29.0 mmol/L Final   08/01/2025 23.4 22.0 - 29.0 mmol/L Final   07/31/2025 23.6 22.0 - 29.0 mmol/L Final      BUN BUN   Date Value Ref Range Status   08/02/2025 17.0 8.0 - 23.0 mg/dL Final   08/01/2025 23.0 8.0 - 23.0 mg/dL Final   07/31/2025 27.0 (H) 8.0 - 23.0 mg/dL Final      Creatinine Creatinine   Date Value Ref Range Status   08/02/2025 0.76 0.57 - 1.00 mg/dL Final   08/01/2025 0.92 0.57 - 1.00 mg/dL Final   07/31/2025 1.20 (H) 0.57 - 1.00 mg/dL Final      Calcium Calcium   Date Value Ref Range Status   08/02/2025 8.9 8.2 - 9.6 mg/dL Final   08/01/2025 8.8 8.2 - 9.6 mg/dL Final   07/31/2025 8.8 8.2 - 9.6 mg/dL Final      PO4 No results found for: \"CAPO4\"   Albumin Albumin   Date Value Ref Range Status   08/02/2025 2.8 (L) 3.5 - 5.2 g/dL Final   08/01/2025 2.7 (L) 3.5 - 5.2 g/dL Final   07/31/2025 3.0 (L) 3.5 - 5.2 g/dL Final      Magnesium Magnesium   Date Value Ref Range Status   08/01/2025 1.6 1.6 - 2.4 mg/dL Final   07/31/2025 1.4 (L) 1.6 - 2.4 mg/dL Final      Uric Acid No results found for: \"URICACID\"        Results Review:     I reviewed the patient's new clinical results.    amLODIPine, 2.5 mg, Oral, Daily  atorvastatin, 10 mg, Oral, Nightly  bisoprolol, 5 mg, Oral, Daily  budesonide-formoterol, 2 puff, Inhalation, BID - RT  [Held by provider] celecoxib, 200 mg, Oral, Daily  famotidine, 20 mg, Oral, BID AC  levothyroxine, 125 mcg, Oral, Q AM  magnesium oxide, 400 mg, Oral, BID  melatonin, 5 mg, Oral, Nightly  miconazole, 1 Application, Topical, Q12H  potassium chloride, 20 mEq, Oral, BID With Meals  sertraline, 100 mg, Oral, Daily           Medication Review: " Reviewed    Assessment & Plan       Acute renal failure    Hypomagnesemia    Hypertension    Anemia    COPD (chronic obstructive pulmonary disease)    Arthritis    Hypokalemia - nutritional    Hypomagnesemia - multifactorial: PPI, nutritional    Plan: Cr improved to 0.7 = baseline.  K and Mag WNL this AM.  Decrease KCl to daily dosing.  Encourage PO as able.  Minimize Celebrex as able.  Could use Tramadol, Tylenol safely.  Keep off diuretics.  OK for D/C from a renal standpoint.      Caden Hahn MD  [facility]  08/02/25  06:27 EDT

## 2025-08-02 NOTE — PLAN OF CARE
Goal Outcome Evaluation:            All goals met, patient safe for discharge.

## 2025-08-02 NOTE — CONSULTS
"Access center attempted consult for 89 year old female in P790. Consult for depression. Spoke with RN who stated that the consult was placed earlier in the day after the patient thought she was going to rehab before she could go home. Pt made a comment similar to \"well I just don't want to do this anymore\". Over the course of the day the pt and family decided that the pt will return home with her daughter. Daughter reports feeling safe with patient to return home and she reports there are other family members available to support her. Pt sleeping on approach but did verbalize being excited to return home and \"happy\" to not have to go into rehab. Pt discharging home tomorrow. Discussed with RN that it appears the situation has resolved and to please consult Access again if further assessment is needed.   "

## 2025-08-02 NOTE — PLAN OF CARE
Goal Outcome Evaluation:  Plan of Care Reviewed With: patient        Progress: no change  Outcome Evaluation: Patients blood pressure slightly elevated this shift amd patients heart rate deyvi while asleep, all other vitals stable. Patient reports pain this shift and prn medications given. Patient alert and oriented and assist x1 to 2 to bathroom. Patient had bm this shift. Will continue to monitor patient.       Problem: Adult Inpatient Plan of Care  Goal: Plan of Care Review  Outcome: Progressing  Flowsheets (Taken 8/2/2025 0508)  Progress: no change  Outcome Evaluation: Patients blood pressure slightly elevated this shift amd patients heart rate deyvi while asleep, all other vitals stable. Patient reports pain this shift and prn medications given. Patient alert and oriented and assist x1 to 2 to bathroom. Patient had bm this shift. Will continue to monitor patient.  Plan of Care Reviewed With: patient     Problem: Skin Injury Risk Increased  Goal: Skin Health and Integrity  Outcome: Progressing  Intervention: Optimize Skin Protection  Recent Flowsheet Documentation  Taken 8/2/2025 0426 by Dora Dillon RN  Head of Bed (HOB) Positioning: HOB at 20-30 degrees  Taken 8/2/2025 0226 by Dora Dillon RN  Head of Bed (HOB) Positioning: HOB at 20-30 degrees  Taken 8/2/2025 0027 by Dora Dillon RN  Head of Bed (HOB) Positioning: HOB at 20-30 degrees  Taken 8/1/2025 2220 by Dora Dillon RN  Head of Bed (HOB) Positioning: HOB at 20-30 degrees  Taken 8/1/2025 2034 by Dora Dillon, RN  Activity Management:   activity encouraged   up in chair  Pressure Reduction Techniques: frequent weight shift encouraged     Problem: Comorbidity Management  Goal: Maintenance of Asthma Control  Outcome: Progressing  Intervention: Maintain Asthma Symptom Control  Recent Flowsheet Documentation  Taken 8/1/2025 2034 by Dora Dillon, RN  Medication Review/Management: medications reviewed  Goal: Maintenance of COPD  Symptom Control  Outcome: Progressing  Intervention: Maintain COPD (Chronic Obstructive Pulmonary Disease) Symptom Control  Recent Flowsheet Documentation  Taken 8/1/2025 2034 by Dora Dillon RN  Medication Review/Management: medications reviewed     Problem: Fall Injury Risk  Goal: Absence of Fall and Fall-Related Injury  Outcome: Progressing  Intervention: Identify and Manage Contributors  Recent Flowsheet Documentation  Taken 8/1/2025 2034 by Dora Dillon RN  Medication Review/Management: medications reviewed  Intervention: Promote Injury-Free Environment  Recent Flowsheet Documentation  Taken 8/2/2025 0426 by Dora Dillon RN  Safety Promotion/Fall Prevention: safety round/check completed  Taken 8/2/2025 0226 by Dora Dillon RN  Safety Promotion/Fall Prevention: safety round/check completed  Taken 8/2/2025 0027 by Dora Dillon RN  Safety Promotion/Fall Prevention: safety round/check completed  Taken 8/1/2025 2220 by Dora Dillon RN  Safety Promotion/Fall Prevention: safety round/check completed  Taken 8/1/2025 2034 by Dora Dillon RN  Safety Promotion/Fall Prevention:   activity supervised   assistive device/personal items within reach   clutter free environment maintained   fall prevention program maintained   lighting adjusted   nonskid shoes/slippers when out of bed   safety round/check completed

## 2025-08-02 NOTE — DISCHARGE SUMMARY
Patient Name: Candice Walker  : 1935  MRN: [id number]    Date of Admission: 2025  Date of Discharge:  2025  Primary Care Physician: Heena Phan APRN      Chief Complaint:   Weakness - Generalized      Discharge Diagnoses     Active Hospital Problems    Diagnosis  POA    **Acute renal failure [N17.9]  Yes    Arthritis [M19.90]  Yes    COPD (chronic obstructive pulmonary disease) [J44.9]  Yes    Hypomagnesemia [E83.42]  Yes    Anemia [D64.9]  Yes    Hypertension [I10]  Yes      Resolved Hospital Problems   No resolved problems to display.        Hospital Course     Ms. Walker is a 89 y.o. female with a history of COPD, hypertension, chronic hyponatremia, arthritis, breast cancer status postmastectomy, COPD, hyperlipidemia, anxiety who presented to [facility] initially complaining of bilateral lower extremity tingling and numbness progressing to the arms along with some confusion.  Please see the admitting history and physical for further details.  She was found to have PHUONG complicated by hyponatremia with hypomagnesia and hypokalemia and was admitted to the hospital for further evaluation and treatment.  PHUONG improved.  Nephrology consulted and suspects due to diuretics and Celebrex.  Electrolytes were replaced.  Nephrology recommended to keep off diuretics and to continue Celebrex as needed only if hydrating well.  Patient with chronic arthritic symptoms and SNF was recommended but she declined SNF and elected for home health care.  Nephrology also recommended that she use tramadol and Tylenol safely as much as possible instead of Celebrex which she can use as needed.  She was cleared for discharge by nephrology and wished to go home with home health care.  Patient discharged home with home health care on 2025 in agreement with plan below.      #Discharge plan    -Follow-up with PCP in 3 to 5 days    -Follow-up with nephrology within 1 week    -Stop taking Lasix     -Stop taking hydrochlorothiazide    -Stop taking Avapro    -Stop taking potassium is off diuretics    -Patient to minimize Celebrex use, only take as needed, hydration encouraged    -Take Tylenol 650 mg p.o. every 4 hours as needed pain    -Take tramadol 50 mg p.o. every 6 hours as needed pain    -Patient declined SNF, home health arranged    Day of Discharge     Subjective:  Patient states she is feeling better and wishes to go home.  Nephrology is cleared.  Patient declined SNF but is agreeable with home health.  Discharge placed she is in agreement with discharge plan    Physical Exam:  Temp:  [97.3 °F (36.3 °C)-98.9 °F (37.2 °C)] 98.9 °F (37.2 °C)  Heart Rate:  [51-63] 55  Resp:  [16] 16  BP: (153-198)/(57-77) 173/66  Body mass index is 26.52 kg/m².  Physical Exam  Constitutional:       Appearance: She is ill-appearing.   HENT:      Head: Normocephalic and atraumatic.      Nose: Nose normal. No congestion.      Mouth/Throat:      Pharynx: Oropharynx is clear. No oropharyngeal exudate.   Eyes:      General: No scleral icterus.  Cardiovascular:      Rate and Rhythm: Normal rate and regular rhythm.      Heart sounds: No murmur heard.     No friction rub. No gallop.   Pulmonary:      Effort: No respiratory distress.      Breath sounds: No wheezing, rhonchi or rales.   Abdominal:      General: There is no distension.      Tenderness: There is no abdominal tenderness. There is no guarding.   Musculoskeletal:         General: No swelling, deformity or signs of injury.      Cervical back: Normal range of motion. No rigidity.   Skin:     Coloration: Skin is not jaundiced.      Findings: No bruising or lesion.   Neurological:      General: No focal deficit present.      Mental Status: She is alert.      Motor: Weakness present.         Consultants     Consult Orders (all) (From admission, onward)       Start     Ordered    08/01/25 1410  Inpatient Access Center Consult  Once        Provider:  (Not yet assigned)     08/01/25 1410    07/29/25 1120  Inpatient Case Management  Consult  Once        Provider:  (Not yet assigned)    07/29/25 1120    07/29/25 1120  Inpatient Nephrology Consult  Once        Specialty:  Nephrology  Provider:  Caden Hahn MD    07/29/25 1120    07/29/25 0615  Inpatient Case Management  Consult  Once        Provider:  (Not yet assigned)    07/29/25 0614    07/28/25 2136  [facility] (on-call MD unless specified) Details  Once,   Status:  Canceled        Specialty:  Hospitalist  Provider:  (Not yet assigned)    07/28/25 2157                  Procedures     * Surgery not found *    Imaging Results (All)       Procedure Component Value Units Date/Time    XR Shoulder 2+ View Right [[id number]] Collected: 08/01/25 1215     Updated: 08/01/25 1220    Narrative:      XR SHOULDER 2+ VW RIGHT-     INDICATIONS: Pain     TECHNIQUE: 3 VIEWS OF THE RIGHT SHOULDER     COMPARISON: None available     FINDINGS:     No acute fracture, erosion, or dislocation is identified. Mild  hypertrophic degenerative changes are apparent. Narrowing of the  acromiohumeral interval is seen, compatible with rotator cuff tear.  Follow-up/further evaluation can be obtained as indications persist.     The thoracic aorta appears tortuous, calcified.       Impression:         As described.           This report was finalized on 8/1/2025 12:16 PM by Dr. Que Jarrett M.D on Workstation: [id number]               Results for orders placed during the hospital encounter of 07/26/19    Adult Transthoracic Echo Complete W/ Cont if Necessary Per Protocol 07/28/2019  8:11 PM    Interpretation Summary  · Left ventricular wall thickness is consistent with mild concentric hypertrophy.  · Estimated EF = 61%.  · Left ventricular systolic function is normal.  · There is moderate calcification of the aortic valve.  · Left atrial cavity size is moderately dilated.  · Left ventricular diastolic dysfunction (grade II)  "consistent with pseudonormalization.  · Mild mitral valve regurgitation is present  · Mild tricuspid valve regurgitation is present.  · Calculated right ventricular systolic pressure from tricuspid regurgitation is 56.0 mmHg.    Pertinent Labs     Results from last 7 days   Lab Units 08/02/25 0251 08/01/25 0500 07/31/25 0544 07/30/25 0527   WBC 10*3/mm3 5.98 5.70 6.13 4.95   HEMOGLOBIN g/dL 8.2* 7.8* 8.8* 8.2*   PLATELETS 10*3/mm3 235 238 245 233     Results from last 7 days   Lab Units 08/02/25 0251 08/01/25 0500 07/31/25 0544 07/30/25 0527   SODIUM mmol/L 136 137 137 140   POTASSIUM mmol/L 4.7 4.2 3.5 3.3*   CHLORIDE mmol/L 105 102 103 104   CO2 mmol/L 24.6 23.4 23.6 24.0   BUN mg/dL 17.0 23.0 27.0* 27.0*   CREATININE mg/dL 0.76 0.92 1.20* 1.07*   GLUCOSE mg/dL 84 80 86 84   EGFR mL/min/1.73 74.5 59.3* 43.1* 49.4*     Results from last 7 days   Lab Units 08/02/25 0251 08/01/25 0500 07/31/25 0544 07/30/25 0527 07/29/25  1149 07/29/25  0334 07/28/25  2331 07/28/25  1818   ALBUMIN g/dL 2.8* 2.7* 3.0* 2.9*   < > 3.1* 3.3* 3.3*   BILIRUBIN mg/dL  --   --   --  0.3  --  0.3 0.3 0.4   ALK PHOS U/L  --   --   --  99  --  110 116 128*   AST (SGOT) U/L  --   --   --  9  --  14 14 15   ALT (SGPT) U/L  --   --   --  6  --  8 10 7    < > = values in this interval not displayed.     Results from last 7 days   Lab Units 08/02/25 0251 08/01/25 0500 07/31/25 0544 07/30/25  0527 07/29/25  1149 07/29/25  0334   CALCIUM mg/dL 8.9 8.8 8.8 8.8   < > 8.6   ALBUMIN g/dL 2.8* 2.7* 3.0* 2.9*   < > 3.1*   MAGNESIUM mg/dL  --  1.6 1.4* 1.4*  --  1.6   PHOSPHORUS mg/dL 3.2 3.0 3.8 3.6   < > 3.8    < > = values in this interval not displayed.         Results from last 7 days   Lab Units 07/28/25  2115   SODIUM UR mmol/L 108   OSMOLALITY UR mOsm/kg 282         Invalid input(s): \"LDLCALC\"          Test Results Pending at Discharge     Pending Results       None              Discharge Details        Discharge Medications        New " Medications        Instructions Start Date   acetaminophen 325 MG tablet  Commonly known as: TYLENOL   650 mg, Oral, Every 4 Hours PRN      traMADol 50 MG tablet  Commonly known as: ULTRAM   50 mg, Oral, Every 6 Hours PRN             Changes to Medications        Instructions Start Date   celecoxib 200 MG capsule  Commonly known as: CeleBREX  What changed:   when to take this  reasons to take this   200 mg, Oral, Daily PRN             Continue These Medications        Instructions Start Date   albuterol sulfate  (90 Base) MCG/ACT inhaler  Commonly known as: PROVENTIL HFA;VENTOLIN HFA;PROAIR HFA   2 puffs, Inhalation, Every 4 Hours PRN      amLODIPine 2.5 MG tablet  Commonly known as: NORVASC   2.5 mg, Daily      bisoprolol 5 MG tablet  Commonly known as: ZEBeta   5 mg, Oral, Daily      Breo Ellipta 200-25 MCG/ACT inhaler  Generic drug: Fluticasone Furoate-Vilanterol   1 puff, Inhalation, Nightly      ferrous sulfate 325 (65 Fe) MG tablet   1 tablet, Oral, Daily      levothyroxine 125 MCG tablet  Commonly known as: SYNTHROID, LEVOTHROID   125 mcg, Oral, Daily      omeprazole 20 MG capsule  Commonly known as: priLOSEC   20 mg, Oral, Daily      Refresh Relieva PF 0.5-1 % solution  Generic drug: Carboxymethylcell-Glycerin PF   1 drop, 2 Times Daily      sertraline 100 MG tablet  Commonly known as: ZOLOFT   100 mg, Oral, Daily      Zocor 20 MG tablet  Generic drug: simvastatin   20 mg, Oral, Nightly             Stop These Medications      furosemide 40 MG tablet  Commonly known as: LASIX     hydroCHLOROthiazide 12.5 MG tablet     irbesartan 300 MG tablet  Commonly known as: AVAPRO     potassium chloride 10 MEQ CR tablet              Allergies   Allergen Reactions    Cefaclor Other (See Comments)     Does not remember    Hydrochlorothiazide Other (See Comments)     Hyponatremia    Penicillins Other (See Comments)     Does not remember    Sulfa Antibiotics Other (See Comments)     Does not remember    Nickel Itching      Has to wear nickel free       Discharge Disposition:  Home-Health Care Svc      Discharge Diet:  Diet Order   Procedures    Diet: Cardiac; Healthy Heart (2-3 Na+); Fluid Consistency: Thin (IDDSI 0)       Discharge Activity:       CODE STATUS:    Code Status and Medical Interventions: CPR (Attempt to Resuscitate); Full Support   Ordered at: 07/28/25 0525     Code Status (Patient has no pulse and is not breathing):    CPR (Attempt to Resuscitate)     Medical Interventions (Patient has pulse or is breathing):    Full Support       No future appointments.  Additional Instructions for the Follow-ups that You Need to Schedule       Ambulatory Referral to Home Health   As directed      Face to Face Visit Date: 8/2/2025   Follow-up provider for Plan of Care?: I treated the patient in an acute care facility and will not continue treatment after discharge.   Follow-up provider: HEENA PHAN [[id number]]   Reason/Clinical Findings: Weakness secondary to arthritis   Describe mobility limitations that make leaving home difficult: Weakness secondary to arthritis   Nursing/Therapeutic Services Requested: Physical Therapy Occupational Therapy   PT orders: Therapeutic exercise Strengthening   Occupational orders: Activities of daily living   Frequency: 1 Week 1               Contact information for follow-up providers       Heena Phan, APRN .    Specialty: Nurse Practitioner  Contact information:  [address]  [facility] [address]  [place] [address]  [phone]                       Contact information for after-discharge care       Home Medical Care       [facility] .    Service: Home Rehabilitation  Contact information:  [address] [place] [address] [phone]                                   Additional Instructions for the Follow-ups that You Need to Schedule       Ambulatory Referral to Home Health   As directed      Face to Face Visit Date: 8/2/2025   Follow-up provider  for Plan of Care?: I treated the patient in an acute care facility and will not continue treatment after discharge.   Follow-up provider: ROSA GUILLEN [[id number]]   Reason/Clinical Findings: Weakness secondary to arthritis   Describe mobility limitations that make leaving home difficult: Weakness secondary to arthritis   Nursing/Therapeutic Services Requested: Physical Therapy Occupational Therapy   PT orders: Therapeutic exercise Strengthening   Occupational orders: Activities of daily living   Frequency: 1 Week 1            Time Spent on Discharge: 35 minutes      DO [facility]  08/02/25  08:59 EDT

## 2025-08-03 NOTE — CASE MANAGEMENT/SOCIAL WORK
Case Management Discharge Note      Final Note: home with clarissa          Selected Continued Care - Discharged on 8/2/2025 Admission date: 7/28/2025 - Discharge disposition: Home-Health Care Svc      Destination    No services have been selected for the patient.                Durable Medical Equipment    No services have been selected for the patient.                Dialysis/Infusion    No services have been selected for the patient.                Home Medical Care Coordination complete.      Service Provider Services Address Phone Fax Patient Preferred    [facility] [address] [place] [address] [phone] --              Therapy    No services have been selected for the patient.                Community Resources    No services have been selected for the patient.                Community & DME    No services have been selected for the patient.                    Selected Continued Care - Prior Encounters Includes continued care and service providers with selected services from prior encounters from 4/29/2025 to 8/2/2025      Discharged on 6/25/2025 Admission date: 6/17/2025 - Discharge disposition: Skilled Nursing Facility (DC - External)      Destination       Service Provider Services Address Phone Fax Patient Preferred    [facility] [address] [facility] [address] [phone] --                          Transportation Services  Transportation: Private Transportation  Private: Car    Final Discharge Disposition Code: 06 - home with home health care

## 2025-08-04 ENCOUNTER — READMISSION MANAGEMENT (OUTPATIENT)
Dept: CALL CENTER | Facility: HOSPITAL | Age: OVER 89
End: 2025-08-04
Payer: MEDICARE

## 2025-08-10 ENCOUNTER — HOSPITAL ENCOUNTER (INPATIENT)
Facility: HOSPITAL | Age: OVER 89
LOS: 9 days | Discharge: NURSING, REHAB OR LONG-TERM CARE | End: 2025-08-20
Attending: EMERGENCY MEDICINE | Admitting: STUDENT IN AN ORGANIZED HEALTH CARE EDUCATION/TRAINING PROGRAM
Payer: MEDICARE

## 2025-08-10 ENCOUNTER — APPOINTMENT (OUTPATIENT)
Dept: GENERAL RADIOLOGY | Facility: HOSPITAL | Age: OVER 89
End: 2025-08-10
Payer: MEDICARE

## 2025-08-10 PROBLEM — E89.0 POST-SURGICAL HYPOTHYROIDISM: Status: ACTIVE | Noted: 2025-08-10

## 2025-08-10 PROBLEM — I50.33 ACUTE ON CHRONIC DIASTOLIC CHF (CONGESTIVE HEART FAILURE): Status: ACTIVE | Noted: 2025-08-10

## 2025-08-10 PROBLEM — E03.9 HYPOTHYROIDISM: Status: ACTIVE | Noted: 2025-08-10

## 2025-08-10 PROBLEM — J96.01 ACUTE RESPIRATORY FAILURE WITH HYPOXIA: Status: ACTIVE | Noted: 2025-08-10

## 2025-08-10 PROBLEM — J96.91 HYPOXIC RESPIRATORY FAILURE: Status: ACTIVE | Noted: 2025-08-10

## 2025-08-10 PROBLEM — E78.5 HLD (HYPERLIPIDEMIA): Status: ACTIVE | Noted: 2025-08-10

## 2025-08-11 ENCOUNTER — READMISSION MANAGEMENT (OUTPATIENT)
Dept: CALL CENTER | Facility: HOSPITAL | Age: OVER 89
End: 2025-08-11
Payer: MEDICARE

## 2025-08-11 ENCOUNTER — APPOINTMENT (OUTPATIENT)
Dept: GENERAL RADIOLOGY | Facility: HOSPITAL | Age: OVER 89
End: 2025-08-11
Payer: MEDICARE

## 2025-08-11 ENCOUNTER — APPOINTMENT (OUTPATIENT)
Dept: CARDIOLOGY | Facility: HOSPITAL | Age: OVER 89
End: 2025-08-11
Payer: MEDICARE

## 2025-08-11 PROBLEM — E87.1 CHRONIC HYPONATREMIA: Status: ACTIVE | Noted: 2025-08-11

## 2025-08-11 PROBLEM — I50.9 ACUTE CHF: Status: ACTIVE | Noted: 2025-08-10

## 2025-08-11 PROBLEM — J96.91 HYPOXIC RESPIRATORY FAILURE: Status: ACTIVE | Noted: 2025-08-11

## 2025-08-13 ENCOUNTER — APPOINTMENT (OUTPATIENT)
Dept: CT IMAGING | Facility: HOSPITAL | Age: OVER 89
End: 2025-08-13
Payer: MEDICARE

## 2025-08-13 PROBLEM — E43 SEVERE PROTEIN-CALORIE MALNUTRITION: Status: ACTIVE | Noted: 2025-08-13

## 2025-08-14 ENCOUNTER — APPOINTMENT (OUTPATIENT)
Dept: GENERAL RADIOLOGY | Facility: HOSPITAL | Age: OVER 89
End: 2025-08-14
Payer: MEDICARE

## 2025-08-15 ENCOUNTER — APPOINTMENT (OUTPATIENT)
Dept: CT IMAGING | Facility: HOSPITAL | Age: OVER 89
End: 2025-08-15
Payer: MEDICARE

## 2025-08-19 ENCOUNTER — APPOINTMENT (OUTPATIENT)
Dept: GENERAL RADIOLOGY | Facility: HOSPITAL | Age: OVER 89
End: 2025-08-19
Payer: MEDICARE

## (undated) DEVICE — GLV SURG BIOGEL LTX PF 8

## (undated) DEVICE — HANDPIECE SET WITH COAXIAL HIGH FLOW TIP AND SUCTION TUBE: Brand: INTERPULSE

## (undated) DEVICE — GLV SURG BIOGEL LTX PF 8 1/2

## (undated) DEVICE — MAT FLR ABSORBENT LG 4FT 10 2.5FT

## (undated) DEVICE — GLV SURG BIOGEL LTX PF 6 1/2

## (undated) DEVICE — SUT ETHIB 2 CV V37 MS/4 30IN MX69G

## (undated) DEVICE — STPLR SKIN VISISTAT WD 35CT

## (undated) DEVICE — OCCLUSIVE GAUZE STRIP,3% BISMUTH TRIBROMOPHENATE IN PETROLATUM BLEND: Brand: XEROFORM

## (undated) DEVICE — GLV SURG SENSICARE MICRO PF LF 6.5 STRL

## (undated) DEVICE — BIT DRL EQUINOXE 2 AND 3.2MM

## (undated) DEVICE — SKIN PREP TRAY W/CHG: Brand: MEDLINE INDUSTRIES, INC.

## (undated) DEVICE — UNDYED BRAIDED (POLYGLACTIN 910), SYNTHETIC ABSORBABLE SUTURE: Brand: COATED VICRYL

## (undated) DEVICE — SUT VIC 2/0 X1 27IN J459H

## (undated) DEVICE — SHEET, DRAPE, SPLIT, STERILE: Brand: MEDLINE

## (undated) DEVICE — ARM SLING: Brand: DEROYAL

## (undated) DEVICE — 2108 SERIES SAGITTAL BLADE (19.5 X 1.27 X 81.0MM)

## (undated) DEVICE — KWIRE EQUINOXE GLENOID NITNL 2X190MM NS
Type: IMPLANTABLE DEVICE | Site: SHOULDER | Status: NON-FUNCTIONAL
Removed: 2017-04-12

## (undated) DEVICE — POOLE SUCTION HANDLE: Brand: CARDINAL HEALTH

## (undated) DEVICE — PAD GRND REM POLYHESIVE A/ DISP

## (undated) DEVICE — BNDG ELAS CO-FLEX SLF ADHR 4IN5YD LF STRL

## (undated) DEVICE — PK SHLDR OPN 40